# Patient Record
Sex: FEMALE | Race: WHITE | NOT HISPANIC OR LATINO | Employment: UNEMPLOYED | URBAN - METROPOLITAN AREA
[De-identification: names, ages, dates, MRNs, and addresses within clinical notes are randomized per-mention and may not be internally consistent; named-entity substitution may affect disease eponyms.]

---

## 2017-09-13 ENCOUNTER — OFFICE VISIT (OUTPATIENT)
Dept: CARDIOLOGY | Facility: CLINIC | Age: 52
End: 2017-09-13

## 2017-09-13 ENCOUNTER — LAB (OUTPATIENT)
Dept: LAB | Facility: HOSPITAL | Age: 52
End: 2017-09-13
Attending: INTERNAL MEDICINE

## 2017-09-13 ENCOUNTER — HOSPITAL ENCOUNTER (OUTPATIENT)
Dept: CARDIOLOGY | Facility: HOSPITAL | Age: 52
Discharge: HOME | End: 2017-09-13
Attending: INTERNAL MEDICINE | Admitting: INTERNAL MEDICINE

## 2017-09-13 VITALS
HEIGHT: 65 IN | RESPIRATION RATE: 20 BRPM | DIASTOLIC BLOOD PRESSURE: 90 MMHG | SYSTOLIC BLOOD PRESSURE: 132 MMHG | BODY MASS INDEX: 26.66 KG/M2 | HEART RATE: 86 BPM | WEIGHT: 160 LBS

## 2017-09-13 VITALS
WEIGHT: 160 LBS | HEART RATE: 84 BPM | HEIGHT: 65 IN | BODY MASS INDEX: 26.66 KG/M2 | OXYGEN SATURATION: 98 % | SYSTOLIC BLOOD PRESSURE: 132 MMHG | DIASTOLIC BLOOD PRESSURE: 90 MMHG

## 2017-09-13 DIAGNOSIS — E78.5 HYPERLIPIDEMIA, UNSPECIFIED HYPERLIPIDEMIA TYPE: ICD-10-CM

## 2017-09-13 DIAGNOSIS — I10 ESSENTIAL HYPERTENSION: ICD-10-CM

## 2017-09-13 DIAGNOSIS — R07.2 PRECORDIAL PAIN: Primary | ICD-10-CM

## 2017-09-13 DIAGNOSIS — Z72.0 TOBACCO ABUSE: ICD-10-CM

## 2017-09-13 DIAGNOSIS — R07.2 PRECORDIAL PAIN: ICD-10-CM

## 2017-09-13 DIAGNOSIS — R55 NEAR SYNCOPE: ICD-10-CM

## 2017-09-13 DIAGNOSIS — R00.2 PALPITATIONS: ICD-10-CM

## 2017-09-13 DIAGNOSIS — R06.09 DYSPNEA ON EXERTION: ICD-10-CM

## 2017-09-13 LAB
ARTICHOKE IGE QN: 169 MG/DL (ref 0–100)
BH CV ECHO MEAS - ACS: 2.1 CM
BH CV ECHO MEAS - AO MAX PG: 5.2 MMHG
BH CV ECHO MEAS - AO ROOT AREA (BSA CORRECTED): 1.7
BH CV ECHO MEAS - AO ROOT AREA: 7.3 CM^2
BH CV ECHO MEAS - AO ROOT DIAM: 3 CM
BH CV ECHO MEAS - AO V2 MAX: 114 CM/SEC
BH CV ECHO MEAS - BSA(HAYCOCK): 1.8 M^2
BH CV ECHO MEAS - BSA: 1.8 M^2
BH CV ECHO MEAS - BZI_BMI: 26.6 KILOGRAMS/M^2
BH CV ECHO MEAS - BZI_METRIC_HEIGHT: 165.1 CM
BH CV ECHO MEAS - BZI_METRIC_WEIGHT: 72.6 KG
BH CV ECHO MEAS - CONTRAST EF 4CH: 80.6 ML/M^2
BH CV ECHO MEAS - EDV(MOD-SP4): 62 ML
BH CV ECHO MEAS - EDV(TEICH): 140.5 ML
BH CV ECHO MEAS - EF(CUBED): 76.1 %
BH CV ECHO MEAS - EF(MOD-SP4): 80.6 %
BH CV ECHO MEAS - EF(TEICH): 67.5 %
BH CV ECHO MEAS - ESV(MOD-SP4): 12 ML
BH CV ECHO MEAS - ESV(TEICH): 45.6 ML
BH CV ECHO MEAS - FS: 37.9 %
BH CV ECHO MEAS - IVS/LVPW: 1.1
BH CV ECHO MEAS - IVSD: 1 CM
BH CV ECHO MEAS - LAT PEAK E' VEL: 7 CM/SEC
BH CV ECHO MEAS - LV DIASTOLIC VOL/BSA (35-75): 34.5 ML/M^2
BH CV ECHO MEAS - LV MASS(C)D: 193.9 GRAMS
BH CV ECHO MEAS - LV MASS(C)DI: 107.8 GRAMS/M^2
BH CV ECHO MEAS - LV SYSTOLIC VOL/BSA (12-30): 6.7 ML/M^2
BH CV ECHO MEAS - LVIDD: 5.4 CM
BH CV ECHO MEAS - LVIDS: 3.3 CM
BH CV ECHO MEAS - LVLD AP4: 7.3 CM
BH CV ECHO MEAS - LVLS AP4: 5.7 CM
BH CV ECHO MEAS - LVPWD: 0.91 CM
BH CV ECHO MEAS - MED PEAK E' VEL: 9 CM/SEC
BH CV ECHO MEAS - MV A DUR: 0.09 SEC
BH CV ECHO MEAS - MV A MAX VEL: 82 CM/SEC
BH CV ECHO MEAS - MV DEC SLOPE: 243.2 CM/SEC^2
BH CV ECHO MEAS - MV DEC TIME: 0.23 SEC
BH CV ECHO MEAS - MV E MAX VEL: 52.9 CM/SEC
BH CV ECHO MEAS - MV E/A: 0.64
BH CV ECHO MEAS - MV P1/2T MAX VEL: 57.7 CM/SEC
BH CV ECHO MEAS - MV P1/2T: 69.5 MSEC
BH CV ECHO MEAS - MVA P1/2T LCG: 3.8 CM^2
BH CV ECHO MEAS - MVA(P1/2T): 3.2 CM^2
BH CV ECHO MEAS - PULM A REVS DUR: 0.1 SEC
BH CV ECHO MEAS - PULM A REVS VEL: 28.6 CM/SEC
BH CV ECHO MEAS - PULM DIAS VEL: 40.7 CM/SEC
BH CV ECHO MEAS - PULM S/D: 1.2
BH CV ECHO MEAS - PULM SYS VEL: 50.4 CM/SEC
BH CV ECHO MEAS - SI(CUBED): 66 ML/M^2
BH CV ECHO MEAS - SI(MOD-SP4): 27.8 ML/M^2
BH CV ECHO MEAS - SI(TEICH): 52.7 ML/M^2
BH CV ECHO MEAS - SV(CUBED): 118.8 ML
BH CV ECHO MEAS - SV(MOD-SP4): 50 ML
BH CV ECHO MEAS - SV(TEICH): 94.9 ML
BH CV ECHO MEAS - TAPSE (>1.6): 2 CM2
BH CV STRESS BP STAGE 1: NORMAL
BH CV STRESS BP STAGE 2: NORMAL
BH CV STRESS DURATION MIN STAGE 1: 3
BH CV STRESS DURATION MIN STAGE 2: 3
BH CV STRESS DURATION SEC STAGE 1: 0
BH CV STRESS DURATION SEC STAGE 2: 0
BH CV STRESS ECHO POST STRESS EJECTION FRACTION EF: 81 %
BH CV STRESS GRADE STAGE 1: 10
BH CV STRESS GRADE STAGE 2: 12
BH CV STRESS HR STAGE 1: 118
BH CV STRESS HR STAGE 2: 150
BH CV STRESS METS STAGE 1: 5
BH CV STRESS METS STAGE 2: 7.5
BH CV STRESS PROTOCOL 1: NORMAL
BH CV STRESS SPEED STAGE 1: 1.7
BH CV STRESS SPEED STAGE 2: 2.5
BH CV STRESS STAGE 1: 1
BH CV STRESS STAGE 2: 2
BH CV XLRA - RV BASE: 2.7 CM
BH CV XLRA - TDI S': 10 CM/SEC
E/E' RATIO: 6.5
LEFT ATRIUM VOLUME INDEX: 13 ML/M2
LV EF 2D ECHO EST: 67 %
MAXIMAL PREDICTED HEART RATE: 166 BPM
PERCENT MAX PREDICTED HR: 90.36 %
STRESS BASELINE BP: NORMAL MMHG
STRESS BASELINE HR: 86 BPM
STRESS PERCENT HR: 106 %
STRESS POST ESTIMATED WORKLOAD: 7 METS
STRESS POST EXERCISE DUR MIN: 6 MIN
STRESS POST EXERCISE DUR SEC: 0 SEC
STRESS POST PEAK BP: NORMAL MMHG
STRESS POST PEAK HR: 150 BPM
STRESS TARGET HR: 141 BPM

## 2017-09-13 PROCEDURE — C8928 TTE W OR W/O FOL W/CON,STRES: HCPCS

## 2017-09-13 PROCEDURE — 93320 DOPPLER ECHO COMPLETE: CPT

## 2017-09-13 PROCEDURE — 93350 STRESS TTE ONLY: CPT | Performed by: INTERNAL MEDICINE

## 2017-09-13 PROCEDURE — 93016 CV STRESS TEST SUPVJ ONLY: CPT | Performed by: INTERNAL MEDICINE

## 2017-09-13 PROCEDURE — 99215 OFFICE O/P EST HI 40 MIN: CPT | Performed by: INTERNAL MEDICINE

## 2017-09-13 PROCEDURE — 93017 CV STRESS TEST TRACING ONLY: CPT

## 2017-09-13 PROCEDURE — 36415 COLL VENOUS BLD VENIPUNCTURE: CPT

## 2017-09-13 PROCEDURE — 25010000002 PERFLUTREN (DEFINITY) 8.476 MG IN SODIUM CHLORIDE 10 ML INJECTION: Performed by: INTERNAL MEDICINE

## 2017-09-13 PROCEDURE — 93000 ELECTROCARDIOGRAM COMPLETE: CPT | Performed by: INTERNAL MEDICINE

## 2017-09-13 PROCEDURE — 83721 ASSAY OF BLOOD LIPOPROTEIN: CPT

## 2017-09-13 PROCEDURE — 93325 DOPPLER ECHO COLOR FLOW MAPG: CPT

## 2017-09-13 PROCEDURE — 93018 CV STRESS TEST I&R ONLY: CPT | Performed by: INTERNAL MEDICINE

## 2017-09-13 PROCEDURE — 93320 DOPPLER ECHO COMPLETE: CPT | Performed by: INTERNAL MEDICINE

## 2017-09-13 PROCEDURE — 93325 DOPPLER ECHO COLOR FLOW MAPG: CPT | Performed by: INTERNAL MEDICINE

## 2017-09-13 RX ADMIN — PERFLUTREN 3 ML: 6.52 INJECTION, SUSPENSION INTRAVENOUS at 15:36
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
## 2017-12-01 ENCOUNTER — APPOINTMENT (OUTPATIENT)
Dept: CT IMAGING | Facility: HOSPITAL | Age: 52
End: 2017-12-01

## 2017-12-01 ENCOUNTER — HOSPITAL ENCOUNTER (INPATIENT)
Facility: HOSPITAL | Age: 52
LOS: 12 days | Discharge: HOME HEALTH | End: 2017-12-13
Attending: FAMILY MEDICINE | Admitting: INTERNAL MEDICINE

## 2017-12-01 ENCOUNTER — APPOINTMENT (OUTPATIENT)
Dept: GENERAL RADIOLOGY | Facility: HOSPITAL | Age: 52
End: 2017-12-01

## 2017-12-01 DIAGNOSIS — E87.6 HYPOKALEMIA: ICD-10-CM

## 2017-12-01 DIAGNOSIS — I63.9 CEREBROVASCULAR ACCIDENT (CVA), UNSPECIFIED MECHANISM (HCC): Primary | ICD-10-CM

## 2017-12-01 DIAGNOSIS — I69.354 HEMIPARESIS AFFECTING LEFT SIDE AS LATE EFFECT OF CEREBROVASCULAR ACCIDENT (HCC): ICD-10-CM

## 2017-12-01 LAB
ABO GROUP BLD: NORMAL
ALBUMIN SERPL-MCNC: 4 G/DL (ref 3.5–5.2)
ALBUMIN/GLOB SERPL: 1.3 G/DL
ALP SERPL-CCNC: 88 U/L (ref 39–117)
ALT SERPL W P-5'-P-CCNC: 24 U/L (ref 1–33)
ANION GAP SERPL CALCULATED.3IONS-SCNC: 14 MMOL/L
AST SERPL-CCNC: 26 U/L (ref 1–32)
BASOPHILS # BLD AUTO: 0.02 10*3/MM3 (ref 0–0.2)
BASOPHILS NFR BLD AUTO: 0.2 % (ref 0–1.5)
BILIRUB SERPL-MCNC: 0.8 MG/DL (ref 0.1–1.2)
BLD GP AB SCN SERPL QL: NEGATIVE
BUN BLD-MCNC: 5 MG/DL (ref 6–20)
BUN/CREAT SERPL: 6.7 (ref 7–25)
CALCIUM SPEC-SCNC: 8.9 MG/DL (ref 8.6–10.5)
CHLORIDE SERPL-SCNC: 98 MMOL/L (ref 98–107)
CO2 SERPL-SCNC: 30 MMOL/L (ref 22–29)
CREAT BLD-MCNC: 0.75 MG/DL (ref 0.57–1)
DEPRECATED RDW RBC AUTO: 62 FL (ref 37–54)
EOSINOPHIL # BLD AUTO: 0.03 10*3/MM3 (ref 0–0.7)
EOSINOPHIL NFR BLD AUTO: 0.3 % (ref 0.3–6.2)
ERYTHROCYTE [DISTWIDTH] IN BLOOD BY AUTOMATED COUNT: 15.6 % (ref 11.7–13)
GFR SERPL CREATININE-BSD FRML MDRD: 81 ML/MIN/1.73
GLOBULIN UR ELPH-MCNC: 3 GM/DL
GLUCOSE BLD-MCNC: 106 MG/DL (ref 65–99)
GLUCOSE BLDC GLUCOMTR-MCNC: 111 MG/DL (ref 70–130)
GLUCOSE BLDC GLUCOMTR-MCNC: 113 MG/DL (ref 70–130)
HCT VFR BLD AUTO: 47.7 % (ref 35.6–45.5)
HGB BLD-MCNC: 16.4 G/DL (ref 11.9–15.5)
HOLD SPECIMEN: NORMAL
HOLD SPECIMEN: NORMAL
IMM GRANULOCYTES # BLD: 0.02 10*3/MM3 (ref 0–0.03)
IMM GRANULOCYTES NFR BLD: 0.2 % (ref 0–0.5)
INR PPP: 0.96 (ref 0.9–1.1)
LYMPHOCYTES # BLD AUTO: 2.27 10*3/MM3 (ref 0.9–4.8)
LYMPHOCYTES NFR BLD AUTO: 25.2 % (ref 19.6–45.3)
MCH RBC QN AUTO: 37.1 PG (ref 26.9–32)
MCHC RBC AUTO-ENTMCNC: 34.4 G/DL (ref 32.4–36.3)
MCV RBC AUTO: 107.9 FL (ref 80.5–98.2)
MONOCYTES # BLD AUTO: 0.59 10*3/MM3 (ref 0.2–1.2)
MONOCYTES NFR BLD AUTO: 6.5 % (ref 5–12)
NEUTROPHILS # BLD AUTO: 6.09 10*3/MM3 (ref 1.9–8.1)
NEUTROPHILS NFR BLD AUTO: 67.6 % (ref 42.7–76)
NRBC BLD MANUAL-RTO: 0.2 /100 WBC (ref 0–0)
PLAT MORPH BLD: NORMAL
PLATELET # BLD AUTO: 300 10*3/MM3 (ref 140–500)
PMV BLD AUTO: 10 FL (ref 6–12)
POTASSIUM BLD-SCNC: 2.9 MMOL/L (ref 3.5–5.2)
PROT SERPL-MCNC: 7 G/DL (ref 6–8.5)
PROTHROMBIN TIME: 12.4 SECONDS (ref 11.7–14.2)
RBC # BLD AUTO: 4.42 10*6/MM3 (ref 3.9–5.2)
RBC MORPH BLD: NORMAL
RH BLD: POSITIVE
SODIUM BLD-SCNC: 142 MMOL/L (ref 136–145)
WBC MORPH BLD: NORMAL
WBC NRBC COR # BLD: 9.02 10*3/MM3 (ref 4.5–10.7)
WHOLE BLOOD HOLD SPECIMEN: NORMAL
WHOLE BLOOD HOLD SPECIMEN: NORMAL

## 2017-12-01 PROCEDURE — 93005 ELECTROCARDIOGRAM TRACING: CPT | Performed by: FAMILY MEDICINE

## 2017-12-01 PROCEDURE — 0 IOPAMIDOL PER 1 ML: Performed by: FAMILY MEDICINE

## 2017-12-01 PROCEDURE — 82962 GLUCOSE BLOOD TEST: CPT

## 2017-12-01 PROCEDURE — 25010000003 POTASSIUM CHLORIDE 10 MEQ/100ML SOLUTION: Performed by: INTERNAL MEDICINE

## 2017-12-01 PROCEDURE — 85025 COMPLETE CBC W/AUTO DIFF WBC: CPT | Performed by: FAMILY MEDICINE

## 2017-12-01 PROCEDURE — 80053 COMPREHEN METABOLIC PANEL: CPT | Performed by: FAMILY MEDICINE

## 2017-12-01 PROCEDURE — 70496 CT ANGIOGRAPHY HEAD: CPT

## 2017-12-01 PROCEDURE — 86900 BLOOD TYPING SEROLOGIC ABO: CPT | Performed by: FAMILY MEDICINE

## 2017-12-01 PROCEDURE — 25010000003 POTASSIUM CHLORIDE 10 MEQ/100ML SOLUTION: Performed by: FAMILY MEDICINE

## 2017-12-01 PROCEDURE — 86850 RBC ANTIBODY SCREEN: CPT | Performed by: FAMILY MEDICINE

## 2017-12-01 PROCEDURE — 71010 HC CHEST PA OR AP: CPT

## 2017-12-01 PROCEDURE — 25010000002 ALTEPLASE PER 1 MG

## 2017-12-01 PROCEDURE — 70498 CT ANGIOGRAPHY NECK: CPT

## 2017-12-01 PROCEDURE — 86901 BLOOD TYPING SEROLOGIC RH(D): CPT | Performed by: FAMILY MEDICINE

## 2017-12-01 PROCEDURE — 3E03317 INTRODUCTION OF OTHER THROMBOLYTIC INTO PERIPHERAL VEIN, PERCUTANEOUS APPROACH: ICD-10-PCS | Performed by: PSYCHIATRY & NEUROLOGY

## 2017-12-01 PROCEDURE — 99291 CRITICAL CARE FIRST HOUR: CPT

## 2017-12-01 PROCEDURE — 99291 CRITICAL CARE FIRST HOUR: CPT | Performed by: PSYCHIATRY & NEUROLOGY

## 2017-12-01 PROCEDURE — 25010000002 ALTEPLASE PER 1 MG: Performed by: PSYCHIATRY & NEUROLOGY

## 2017-12-01 PROCEDURE — 85007 BL SMEAR W/DIFF WBC COUNT: CPT | Performed by: FAMILY MEDICINE

## 2017-12-01 PROCEDURE — 93010 ELECTROCARDIOGRAM REPORT: CPT | Performed by: INTERNAL MEDICINE

## 2017-12-01 PROCEDURE — 82565 ASSAY OF CREATININE: CPT

## 2017-12-01 PROCEDURE — 85610 PROTHROMBIN TIME: CPT | Performed by: FAMILY MEDICINE

## 2017-12-01 PROCEDURE — 0042T HC CT CEREBRAL PERFUSION W/WO CONTRAST: CPT

## 2017-12-01 RX ORDER — ASPIRIN 325 MG
325 TABLET ORAL DAILY
Status: DISCONTINUED | OUTPATIENT
Start: 2017-12-02 | End: 2017-12-02

## 2017-12-01 RX ORDER — POTASSIUM CHLORIDE 7.45 MG/ML
10 INJECTION INTRAVENOUS
Status: DISCONTINUED | OUTPATIENT
Start: 2017-12-01 | End: 2017-12-13 | Stop reason: HOSPADM

## 2017-12-01 RX ORDER — POTASSIUM CHLORIDE 750 MG/1
40 CAPSULE, EXTENDED RELEASE ORAL AS NEEDED
Status: DISCONTINUED | OUTPATIENT
Start: 2017-12-01 | End: 2017-12-13 | Stop reason: HOSPADM

## 2017-12-01 RX ORDER — ROPINIROLE 1 MG/1
1 TABLET, FILM COATED ORAL NIGHTLY
COMMUNITY

## 2017-12-01 RX ORDER — METOCLOPRAMIDE 10 MG/1
10 TABLET ORAL
COMMUNITY
End: 2017-12-13 | Stop reason: HOSPADM

## 2017-12-01 RX ORDER — POTASSIUM CHLORIDE 1.5 G/1.77G
40 POWDER, FOR SOLUTION ORAL AS NEEDED
Status: DISCONTINUED | OUTPATIENT
Start: 2017-12-01 | End: 2017-12-13 | Stop reason: HOSPADM

## 2017-12-01 RX ORDER — SODIUM CHLORIDE 0.9 % (FLUSH) 0.9 %
10 SYRINGE (ML) INJECTION AS NEEDED
Status: DISCONTINUED | OUTPATIENT
Start: 2017-12-01 | End: 2017-12-13 | Stop reason: HOSPADM

## 2017-12-01 RX ORDER — ASPIRIN 300 MG/1
300 SUPPOSITORY RECTAL DAILY
Status: CANCELLED | OUTPATIENT
Start: 2017-12-02

## 2017-12-01 RX ORDER — FAMOTIDINE 10 MG/ML
20 INJECTION, SOLUTION INTRAVENOUS EVERY 12 HOURS SCHEDULED
Status: DISCONTINUED | OUTPATIENT
Start: 2017-12-01 | End: 2017-12-04

## 2017-12-01 RX ORDER — ATORVASTATIN CALCIUM 80 MG/1
80 TABLET, FILM COATED ORAL NIGHTLY
Status: CANCELLED | OUTPATIENT
Start: 2017-12-01

## 2017-12-01 RX ORDER — SODIUM CHLORIDE 9 MG/ML
100 INJECTION, SOLUTION INTRAVENOUS ONCE
Status: COMPLETED | OUTPATIENT
Start: 2017-12-01 | End: 2017-12-01

## 2017-12-01 RX ORDER — SODIUM CHLORIDE 0.9 % (FLUSH) 0.9 %
1-10 SYRINGE (ML) INJECTION AS NEEDED
Status: CANCELLED | OUTPATIENT
Start: 2017-12-01

## 2017-12-01 RX ORDER — SODIUM CHLORIDE 0.9 % (FLUSH) 0.9 %
1-10 SYRINGE (ML) INJECTION AS NEEDED
Status: DISCONTINUED | OUTPATIENT
Start: 2017-12-01 | End: 2017-12-13 | Stop reason: HOSPADM

## 2017-12-01 RX ORDER — ASPIRIN 325 MG
325 TABLET ORAL DAILY
Status: CANCELLED | OUTPATIENT
Start: 2017-12-02

## 2017-12-01 RX ORDER — ASPIRIN 300 MG/1
300 SUPPOSITORY RECTAL DAILY
Status: DISCONTINUED | OUTPATIENT
Start: 2017-12-02 | End: 2017-12-02

## 2017-12-01 RX ORDER — ATORVASTATIN CALCIUM 80 MG/1
80 TABLET, FILM COATED ORAL NIGHTLY
Status: DISCONTINUED | OUTPATIENT
Start: 2017-12-01 | End: 2017-12-13 | Stop reason: HOSPADM

## 2017-12-01 RX ORDER — SODIUM CHLORIDE 9 MG/ML
75 INJECTION, SOLUTION INTRAVENOUS CONTINUOUS
Status: DISCONTINUED | OUTPATIENT
Start: 2017-12-01 | End: 2017-12-13 | Stop reason: HOSPADM

## 2017-12-01 RX ORDER — CYCLOBENZAPRINE HCL 10 MG
10 TABLET ORAL 2 TIMES DAILY PRN
COMMUNITY
End: 2017-12-13 | Stop reason: HOSPADM

## 2017-12-01 RX ORDER — POTASSIUM CHLORIDE 7.45 MG/ML
10 INJECTION INTRAVENOUS ONCE
Status: COMPLETED | OUTPATIENT
Start: 2017-12-01 | End: 2017-12-01

## 2017-12-01 RX ADMIN — SODIUM CHLORIDE 100 ML/HR: 9 INJECTION, SOLUTION INTRAVENOUS at 16:48

## 2017-12-01 RX ADMIN — FAMOTIDINE 20 MG: 10 INJECTION INTRAVENOUS at 21:00

## 2017-12-01 RX ADMIN — SODIUM CHLORIDE 75 ML/HR: 9 INJECTION, SOLUTION INTRAVENOUS at 17:14

## 2017-12-01 RX ADMIN — ALTEPLASE 54.7 MG: KIT at 16:03

## 2017-12-01 RX ADMIN — POTASSIUM CHLORIDE 10 MEQ: 7.46 INJECTION, SOLUTION INTRAVENOUS at 17:22

## 2017-12-01 RX ADMIN — ALTEPLASE 6.1 MG: KIT at 16:00

## 2017-12-01 RX ADMIN — POTASSIUM CHLORIDE 10 MEQ: 7.46 INJECTION, SOLUTION INTRAVENOUS at 22:18

## 2017-12-01 RX ADMIN — FAMOTIDINE 20 MG: 10 INJECTION INTRAVENOUS at 17:15

## 2017-12-01 RX ADMIN — IOPAMIDOL 150 ML: 755 INJECTION, SOLUTION INTRAVENOUS at 15:50

## 2017-12-01 NOTE — ED NOTES
Patient stated she was not feeling well all day, weak, could barely walk. Stated her right side gave out on her and she fell and hit her head on the kitchen table. Complains of a headache due to fall on the right back side of head. Also states she has not been feeling well for 4 weeks and had an US for gallbladder and has not received the results yet.      Shira Lawrence RN  12/01/17 4994       Shira Lawrence RN  12/01/17 6855

## 2017-12-01 NOTE — ED PROVIDER NOTES
EMERGENCY DEPARTMENT ENCOUNTER    CHIEF COMPLAINT  Chief Complaint: Neuro deficits  History given by: Patient and EMS  History limited by: Nothing  Room Number: 387/1  PMD: Ivonne Bradley MD      HPI:  Pt is a 52 y.o. female who presents complaining of neuro deficits onset 1300 today. The pt was talking to family on the phone when the pt states she felt weak and fell to the right. The pt was last known normal at 1300 when she was talking on the phone with family. The pt's family was unable to get in contact of the pt, so EMS was called. The pt states she hit her head during the fall. Pt reports HA, left facial paralysis, and left sided weakness. Pt denies CP.    When EMS arrived, they state the pt had left facial droop and left sided weakness. EMS states the pt was unable to squeeze on the left upon initial encounter, but they state the pt has improved since their first exam. The pt had a BS of 99 per EMS.      Duration: Since 1300 today  Onset: Sudden  Timing: Constant  Location: Left sided  Radiation: None  Quality: Weakness  Intensity/Severity: Moderate  Progression: Improving per EMS  Associated Symptoms: Pt reports HA, left facial paralysis, and left sided weakness.  Aggravating Factors: None stated  Alleviating Factors: None stated  Previous Episodes: None  Treatment before arrival: Nothing    PAST MEDICAL HISTORY  Active Ambulatory Problems     Diagnosis Date Noted   • Complete tear of right rotator cuff 03/22/2016   • Precordial pain 09/13/2017   • Dyspnea on exertion 09/13/2017   • Palpitations 09/13/2017   • Essential hypertension 09/13/2017   • Hyperlipidemia 09/13/2017   • Tobacco abuse 09/13/2017     Resolved Ambulatory Problems     Diagnosis Date Noted   • No Resolved Ambulatory Problems     Past Medical History:   Diagnosis Date   • Allergic rhinitis    • Ankle swelling    • Anxiety    • Arthritis    • Asthma    • Attention deficit hyperactivity disorder    • Back pain    • Chest pain    • CHF  (congestive heart failure)    • COPD (chronic obstructive pulmonary disease)    • Coronary artery disease    • Depression    • Esophageal reflux    • High cholesterol    • History of colonoscopy    • Hypercholesterolemia    • Hypertension    • IBS (irritable bowel syndrome)    • Insomnia    • Osteopenia    • Palpitations    • Rotator cuff tendonitis    • Seborrheic dermatitis    • Stroke    • Tendinitis, de Quervain's    • Tobacco use    • Vitamin B12 deficiency    • Vitamin D deficiency        PAST SURGICAL HISTORY  Past Surgical History:   Procedure Laterality Date   • COLONOSCOPY     • DIAGNOSTIC LAPAROSCOPY EXPLORATORY LAPAROTOMY     • ELBOW ARTHROPLASTY Right     Elbow surgery   • FRACTURE SURGERY     • HYSTERECTOMY  1999    Including both ovaries   • TONSILLECTOMY     • WRIST SURGERY Left        FAMILY HISTORY  Family History   Problem Relation Age of Onset   • Heart attack Mother    • Heart disease Mother    • Skin cancer Mother    • Lung cancer Father    • Heart attack Brother    • Heart disease Brother      CABG; Pacemaker Placement   • Colon cancer Brother 61   • Heart disease Maternal Grandmother    • Anxiety disorder Other      (Symptom)   • Colon cancer Other    • Depression Other        SOCIAL HISTORY  Social History     Social History   • Marital status:      Spouse name: N/A   • Number of children: N/A   • Years of education: N/A     Occupational History   • Not on file.     Social History Main Topics   • Smoking status: Current Every Day Smoker     Packs/day: 1.50   • Smokeless tobacco: Never Used      Comment: caffeine use   • Alcohol use Yes      Comment: twice monthly-socially   • Drug use: No   • Sexual activity: Not Currently     Other Topics Concern   • Not on file     Social History Narrative       ALLERGIES  Sulfa antibiotics; Beta adrenergic blockers; Codeine; Erythromycin; Penicillins; Tetracyclines & related; and Varenicline    REVIEW OF SYSTEMS  Review of Systems   Constitutional:  Negative for chills and fever.   HENT: Negative for sore throat and trouble swallowing.    Eyes: Negative for visual disturbance.   Respiratory: Negative for cough and shortness of breath.    Cardiovascular: Negative for chest pain, palpitations and leg swelling.   Gastrointestinal: Negative for abdominal pain, diarrhea and vomiting.   Endocrine: Negative.    Genitourinary: Negative for decreased urine volume, dysuria and frequency.   Musculoskeletal: Negative for neck pain.   Skin: Negative for rash.   Allergic/Immunologic: Negative.    Neurological: Positive for facial asymmetry (Left), weakness (Left -sided) and headaches. Negative for syncope and numbness.   Hematological: Negative.    Psychiatric/Behavioral: Negative.    All other systems reviewed and are negative.      PHYSICAL EXAM  ED Triage Vitals   Temp Heart Rate Resp BP SpO2   -- 12/01/17 1529 12/01/17 1529 12/01/17 1529 12/01/17 1529    88 16 95/63 100 %      Temp src Heart Rate Source Patient Position BP Location FiO2 (%)   -- -- -- -- --              Physical Exam   Constitutional: She is oriented to person, place, and time.   HENT:   Head: Normocephalic and atraumatic.   Eyes: EOM are normal. Pupils are equal, round, and reactive to light.   Neck: Normal range of motion. Neck supple.   Cardiovascular: Normal rate, regular rhythm and normal heart sounds.    Pulmonary/Chest: Effort normal and breath sounds normal. No respiratory distress.   Abdominal: Soft. There is no tenderness. There is no rebound and no guarding.   Musculoskeletal: She exhibits no edema.   Neurological: She is alert and oriented to person, place, and time. She has normal sensation and normal strength. She displays facial asymmetry (Mild left).   The pt has weakness to the left arm. The pt has left-sided sensory deficit.   Skin: Skin is warm and dry. No rash noted.   Nursing note and vitals reviewed.      LAB RESULTS  Lab Results (last 24 hours)     Procedure Component Value Units  Date/Time    CBC & Differential [[id number]] Collected:  12/01/17 1537    Specimen:  Blood Updated:  12/01/17 1628    Narrative:       The following orders were created for panel order CBC & Differential.  Procedure                               Abnormality         Status                     ---------                               -----------         ------                     Scan Slide[[id number]]                   Normal              Final result               CBC Auto Differential[[id number]]        Abnormal            Final result                 Please view results for these tests on the individual orders.    Comprehensive Metabolic Panel [[id number]]  (Abnormal) Collected:  12/01/17 1537    Specimen:  Blood Updated:  12/01/17 1608     Glucose 106 (H) mg/dL      BUN 5 (L) mg/dL      Creatinine 0.75 mg/dL      Sodium 142 mmol/L      Potassium 2.9 (L) mmol/L      Chloride 98 mmol/L      CO2 30.0 (H) mmol/L      Calcium 8.9 mg/dL      Total Protein 7.0 g/dL      Albumin 4.00 g/dL      ALT (SGPT) 24 U/L      AST (SGOT) 26 U/L      Alkaline Phosphatase 88 U/L      Total Bilirubin 0.8 mg/dL      eGFR Non African Amer 81 mL/min/1.73      Globulin 3.0 gm/dL      A/G Ratio 1.3 g/dL      BUN/Creatinine Ratio 6.7 (L)     Anion Gap 14.0 mmol/L     Protime-INR [[id number]]  (Normal) Collected:  12/01/17 1537    Specimen:  Blood Updated:  12/01/17 1601     Protime 12.4 Seconds      INR 0.96    CBC Auto Differential [[id number]]  (Abnormal) Collected:  12/01/17 1537    Specimen:  Blood Updated:  12/01/17 1552     WBC 9.02 10*3/mm3      RBC 4.42 10*6/mm3      Hemoglobin 16.4 (H) g/dL      Hematocrit 47.7 (H) %      .9 (H) fL      MCH 37.1 (H) pg      MCHC 34.4 g/dL      RDW 15.6 (H) %      RDW-SD 62.0 (H) fl      MPV 10.0 fL      Platelets 300 10*3/mm3      Neutrophil % 67.6 %      Lymphocyte % 25.2 %      Monocyte % 6.5 %      Eosinophil % 0.3 %      Basophil % 0.2 %      Immature Grans % 0.2 %      Neutrophils, Absolute  6.09 10*3/mm3      Lymphocytes, Absolute 2.27 10*3/mm3      Monocytes, Absolute 0.59 10*3/mm3      Eosinophils, Absolute 0.03 10*3/mm3      Basophils, Absolute 0.02 10*3/mm3      Immature Grans, Absolute 0.02 10*3/mm3      nRBC 0.2 (H) /100 WBC     Scan Slide [[id number]]  (Normal) Collected:  12/01/17 1537    Specimen:  Blood Updated:  12/01/17 1628     RBC Morphology Normal     WBC Morphology Normal     Platelet Morphology Normal    POC Glucose Fingerstick [[id number]]  (Normal) Collected:  12/01/17 1807    Specimen:  Blood Updated:  12/01/17 1827     Glucose 113 mg/dL     Narrative:       Meter: [id number] : [id number] Ezequiel Albrecht          I ordered the above labs and reviewed the results    RADIOLOGY  XR Chest 1 View    (Results Pending)   CT Cerebral Perfusion With & Without Contrast    (Results Pending)   CT Angiogram Neck With & Without Contrast    (Results Pending)   CT Angiogram Head With & Without Contrast    (Results Pending)      CT scans negative for hemorrhage  CXR - Negative acute    I ordered the above noted radiological studies. Interpreted by radiologist. Reviewed by me in PACS.       PROCEDURES  Critical Care  Performed by: ALEYDA SANTO  Authorized by: ALEYDA SANTO     Critical care provider statement:     Critical care time (minutes):  35    Critical care time was exclusive of:  Separately billable procedures and treating other patients    Critical care was necessary to treat or prevent imminent or life-threatening deterioration of the following conditions:  CNS failure or compromise    Critical care was time spent personally by me on the following activities:  Development of treatment plan with patient or surrogate, discussions with consultants, evaluation of patient's response to treatment, examination of patient, obtaining history from patient or surrogate, ordering and performing treatments and interventions, ordering and review of laboratory studies, pulse oximetry and  re-evaluation of patient's condition        Interval: baseline  1a. Level Of Consciousness: 0-->Alert: keenly responsive  1b. LOC Questions: 0-->Answers both questions correctly  1c. LOC Commands: 0-->Performs both tasks correctly  2. Best Gaze: 0-->Normal  3. Visual: 0-->No visual loss  4. Facial Palsy: 1-->Minor paralysis (flattened nasolabial fold, asymmetry on smiling)  5a. Motor Arm, Left: 1-->Drift: limb holds 90 (or 45) degrees, but drifts down before full 10 seconds: does not hit bed or other support  5b. Motor Arm, Right: 0-->No drift: limb holds 90 (or 45) degrees for full 10 secs  6a. Motor Leg, Left: 0-->No drift: leg holds 30 degree position for full 5 secs  6b. Motor Leg, Right: 0-->No drift: leg holds 30 degree position for full 5 secs  7. Limb Ataxia: 0-->Absent  8. Sensory: 1-->Mild-to-moderate sensory loss: patient feels pinprick is less sharp or is dull on the affected side: or there is a loss of superficial pain with pinprick, but patient is aware of being touched  9. Best Language: 0-->No aphasia: normal  10. Dysarthria: 0-->Normal  11. Extinction and Inattention (formerly Neglect): 0-->No abnormality    Total (NIH Stroke Scale): 3      EKG           EKG time: 1639  Rhythm/Rate: 95 Normal Sinus  P waves and CA: Normal  QRS, axis: Normal   ST and T waves: Nonspecific ST depression     Interpreted Contemporaneously by me, independently viewed  ST depression is changed compared to prior Feb, 2004      PROGRESS AND CONSULTS  ED Course     1531  Received a call from Dr. Bunch and discussed pt's case. Dr. Bunch agreed with plan to order the CT scan. He agrees with the plan to administer tPA if the CT shows no hemorrhage.    1536  CXR, EKG, CT Head, and labs ordered for further evaluation.    1550  CTA Head, CTA Neck, and CT Cerebral Perfusion ordered for further evaluation.    1621  Dr. Bunch is in the ED and is evaluating the pt after the pt has received tPA.    1633  Consult placed to  Pulmonology.    1642  Received a call from Dr. Drew and discussed pt's case. Dr. Drew agreed with plan to admit the pt.    1711  Potassium chloride ordered.      MEDICAL DECISION MAKING  Results were reviewed/discussed with the patient and they were also made aware of online access. Pt also made aware that some labs, such as cultures, will not be resulted during ER visit and follow up with PMD is necessary.     MDM  Number of Diagnoses or Management Options  Cerebrovascular accident (CVA), unspecified mechanism:      Amount and/or Complexity of Data Reviewed  Clinical lab tests: ordered and reviewed (Potassium - 2.9)  Tests in the radiology section of CPT®: reviewed and ordered (CT Scans showed no hemorrhage  CXR - Negative acute)  Tests in the medicine section of CPT®: reviewed and ordered (Refer to the procedure section of the note for EKG results  )  Discussion of test results with the performing providers: yes (Dr. Drew)  Discuss the patient with other providers: yes (Dr. Bunch)    Patient Progress  Patient progress: stable         DIAGNOSIS  Final diagnoses:   Cerebrovascular accident (CVA), unspecified mechanism   Hypokalemia       DISPOSITION  ADMISSION    Discussed treatment plan and reason for admission with pt/family and admitting physician.  Pt/family voiced understanding of the plan for admission for further testing/treatment as needed.         Latest Documented Vital Signs:  As of 6:40 PM  BP- 135/88 HR- 91 Temp- 97.8 °F (36.6 °C) (Oral) O2 sat- 95%    --  Documentation assistance provided by patti Salinas for Dr. Leavitt.  Information recorded by the scribe was done at my direction and has been verified and validated by me.       Spencer Salinas  12/01/17 1841       Colton Leavitt MD  12/01/17 0150

## 2017-12-01 NOTE — PROGRESS NOTES
Clinical Pharmacy Services: Medication History    Angelita Brambila is a 52 y.o. female presenting to [facility] for   Chief Complaint   Patient presents with   • Neuro Deficit(s)       She  has a past medical history of Allergic rhinitis; Ankle swelling; Anxiety; Arthritis; Asthma; Attention deficit hyperactivity disorder; Back pain; Chest pain; Depression; Esophageal reflux; High cholesterol; History of colonoscopy; Hypercholesterolemia; Hypertension; IBS (irritable bowel syndrome); Insomnia; Multiple benign polyps of large intestine; Osteopenia; Palpitations; Rotator cuff tendonitis; Seborrheic dermatitis; Tendinitis, de Quervain's; Tobacco use; Vitamin B12 deficiency; and Vitamin D deficiency.    Allergies as of 12/01/2017 - Juan as Reviewed 12/01/2017   Allergen Reaction Noted   • Sulfa antibiotics Shortness Of Breath 01/02/2016   • Beta adrenergic blockers  01/02/2016   • Codeine  01/02/2016   • Erythromycin  01/02/2016   • Penicillins Hives 01/02/2016   • Tetracyclines & related  01/02/2016   • Varenicline  01/02/2016       Medication information was obtained from: Patient, medication bottles  Pharmacy and Phone Number: [facility] [phone]    Prior to Admission Medications     Prescriptions Last Dose Informant Patient Reported? Taking?    amLODIPine (NORVASC) 5 MG tablet 11/30/2017 Medication Bottle Yes Yes    Take 5 mg by mouth Daily.    carvedilol (COREG) 3.125 MG tablet 11/30/2017 Medication Bottle Yes Yes    Take 3.125 mg by mouth 2 (Two) Times a Day.    cyclobenzaprine (FLEXERIL) 10 MG tablet 11/30/2017 Medication Bottle Yes Yes    Take 10 mg by mouth 2 (Two) Times a Day As Needed for Muscle Spasms.    metoclopramide (REGLAN) 10 MG tablet 11/30/2017 Medication Bottle Yes Yes    Take 10 mg by mouth 3 (Three) Times a Day Before Meals.    pantoprazole (PROTONIX) 40 MG EC tablet 11/30/2017 Medication Bottle Yes Yes    Take 1 tablet by mouth daily.    rOPINIRole (REQUIP) 1 MG tablet 11/30/2017  Medication Bottle Yes Yes    Take 1 mg by mouth 2 (Two) Times a Day As Needed. Take 1 hour before bedtime.    traZODone (DESYREL) 50 MG tablet 11/30/2017 Medication Bottle Yes Yes    Take 1 tablet by mouth every night at bedtime.    venlafaxine XR (EFFEXOR-XR) 75 MG 24 hr capsule 11/30/2017 Medication Bottle Yes Yes    Take 225 mg by mouth Daily.    fluticasone (FLONASE) 50 MCG/ACT nasal spray  Medication Bottle Yes No    2 sprays into each nostril once daily.            Medication notes: Removed: Atorvastatin and Zyban, patient says she no longer takes medication    This medication list is complete to the best of my knowledge as of 12/1/2017    Please call if questions.    Amanda Ellis, Medication History Technician  12/1/2017 5:43 PM

## 2017-12-01 NOTE — ED NOTES
States she continues to have a headache from the fall, has not change in intensity during TPA.      Shira Lawrence RN  12/01/17 7546

## 2017-12-01 NOTE — H&P
Group: [facility]         H/p  NOTE    Patient Identification:  Angelita Brambila  52 y.o.  female  1965  [id number]                 CC:     History of Present Illness:  Very pleasant 52-year-old female history of tobacco abuse presented to [facility] ERS team D.  She was noted to have left-sided hemiparesis and initial NIH of 3 up to 10.  She was given TPA per neurology.  We were asked to admit to the ICU.  Patient currently with some improvement neurologically.  She is awake alert and complains of some headache.  Apparently she did have a fall at home and hit her head on the table.  Denies any chest pain no shortness of breath at this time.  No previous history of CVA.  History of MI in the past.      Review of Systems  Constitutional: Negative for chills and fever.   HENT: Negative for sore throat and trouble swallowing.    Eyes: Negative for visual disturbance.   Respiratory: Negative for cough and shortness of breath.    Cardiovascular: Negative for chest pain, palpitations and leg swelling.   Gastrointestinal: Negative for abdominal pain, diarrhea and vomiting.   Endocrine: Negative.    Genitourinary: Negative for decreased urine volume, dysuria and frequency.   Musculoskeletal: Negative for neck pain.   Skin: Negative for rash.   Allergic/Immunologic: Negative.    Neurological: Positive for facial asymmetry (Left), weakness (Left -sided) and headaches. Negative for syncope and numbness.   Hematological: Negative.    Psychiatric/Behavioral: Negative.    All other systems reviewed and are negative.  Past Medical History:  Past Medical History:   Diagnosis Date   • Allergic rhinitis    • Ankle swelling    • Anxiety    • Arthritis    • Asthma    • Attention deficit hyperactivity disorder    • Back pain    • Chest pain    • Depression    • Esophageal reflux    • High cholesterol     Reported cholesterol level was high   • History of colonoscopy     Complete colonoscopy   • Hypercholesterolemia    •  Hypertension    • IBS (irritable bowel syndrome)    • Insomnia    • Multiple benign polyps of large intestine    • Osteopenia    • Palpitations    • Rotator cuff tendonitis    • Seborrheic dermatitis    • Tendinitis, de Quervain's    • Tobacco use    • Vitamin B12 deficiency    • Vitamin D deficiency        Past Surgical History:  Past Surgical History:   Procedure Laterality Date   • COLONOSCOPY     • DIAGNOSTIC LAPAROSCOPY EXPLORATORY LAPAROTOMY     • ELBOW ARTHROPLASTY Right     Elbow surgery   • HYSTERECTOMY  1999    Including both ovaries   • TONSILLECTOMY     • WRIST SURGERY Left         Home Meds:    (Not in a hospital admission)    Allergies:  Allergies   Allergen Reactions   • Sulfa Antibiotics Shortness Of Breath   • Beta Adrenergic Blockers      Other reaction(s): Syncope   • Codeine    • Erythromycin      Other reaction(s): Abdominal pain   • Penicillins Hives   • Tetracyclines & Related      Other reaction(s): Abdominal pain   • Varenicline      Other reaction(s): Angina       Social History:   Social History     Social History   • Marital status:      Spouse name: N/A   • Number of children: N/A   • Years of education: N/A     Occupational History   • Not on file.     Social History Main Topics   • Smoking status: Current Every Day Smoker     Packs/day: 1.50   • Smokeless tobacco: Never Used      Comment: caffeine use   • Alcohol use Yes      Comment: twice monthly   • Drug use: No   • Sexual activity: Not on file     Other Topics Concern   • Not on file     Social History Narrative       Family History:  Family History   Problem Relation Age of Onset   • Heart attack Mother    • Heart disease Mother    • Skin cancer Mother    • Lung cancer Father    • Heart attack Brother    • Heart disease Brother      CABG; Pacemaker Placement   • Colon cancer Brother 61   • Heart disease Maternal Grandmother    • Anxiety disorder Other      (Symptom)   • Colon cancer Other    • Depression Other   "      Physical Exam:  /89  Pulse 90  Temp 98.7 °F (37.1 °C) (Tympanic)   Resp 16  Ht 65\" (165.1 cm)  Wt 149 lb 0.5 oz (67.6 kg)  SpO2 97%  BMI 24.8 kg/m2 Body mass index is 24.8 kg/(m^2). 97% 149 lb 0.5 oz (67.6 kg)  Physical Exam  Awake no distress no labored breathing  No respiratory distress  Neck is supple no bruit no adenopathy  Chest is clear no wheezing or rales  CVS regular rate and rhythm no murmurs no gallop  Normal sinus rhythm on the monitor  Abdomen is soft no masses felt nontender  Extremities no edema the sinuses no clubbing  CNS with left hemiparesis and left facial droop noted  No hallucination or delusion or joint deformities or skin rashes    LABS:  Lab Results   Component Value Date    CALCIUM 8.9 12/01/2017     Results from last 7 days  Lab Units 12/01/17  1537   SODIUM mmol/L 142   POTASSIUM mmol/L 2.9*   CHLORIDE mmol/L 98   CO2 mmol/L 30.0*   BUN mg/dL 5*   CREATININE mg/dL 0.75   GLUCOSE mg/dL 106*   CALCIUM mg/dL 8.9   WBC 10*3/mm3 9.02   HEMOGLOBIN g/dL 16.4*   PLATELETS 10*3/mm3 300   ALT (SGPT) U/L 24   AST (SGOT) U/L 26     No results found for: CKTOTAL, CKMB, CKMBINDEX, TROPONINI, TROPONINT                        Results from last 7 days  Lab Units 12/01/17  1537   INR  0.96         No results found for: TSH  Estimated Creatinine Clearance: 91.5 mL/min (by C-G formula based on Cr of 0.75).         Imaging: I personally visualized the images of scans/x-rays performed within last 3 days.      Assessment:  Acute CVA status post TPA  Elevated blood pressure  History of tobacco abuse  Hypokalemia    Recommendations:  Post TPA stroke protocol  Neuro checks per protocol  Blood pressure management as per stroke protocol  Replace electrolytes  ICU core measures  Post TPA swallow evaluation  IV fluids normal saline  Discussed with family  Admit to ICU            Aly Drew MD  12/1/2017  5:02 PM      Much of this encounter note is an electronic transcription/translation of " spoken language to printed text using Dragon Software.

## 2017-12-01 NOTE — PLAN OF CARE
Problem: Fall Risk (Adult)  Goal: Identify Related Risk Factors and Signs and Symptoms  Outcome: Ongoing (interventions implemented as appropriate)    12/01/17 1820   Fall Risk   Fall Risk: Related Risk Factors impaired vision;neuro disease/injury;sensory deficits   Fall Risk: Signs and Symptoms presence of risk factors       Goal: Absence of Falls  Outcome: Ongoing (interventions implemented as appropriate)    Problem: Stroke (Ischemic) (Adult)  Goal: Signs and Symptoms of Listed Potential Problems Will be Absent or Manageable (Stroke)  Outcome: Ongoing (interventions implemented as appropriate)    12/01/17 1820   Stroke (Ischemic)   Problems Assessed (Stroke (Ischemic)/TIA) all   Problems Present (Stroke (Ischemic)/TIA) acute neurologic deterioration;communication impairment;eating/swallowing impairment

## 2017-12-01 NOTE — CONSULTS
NEUROLOGY CONSULTATION        PATIENT Angelita Brambila    1965   MRN [id number]   ADMIT DATE 2017   LENGTH OF STAY 0 days   ATTENDING Colton Leavitt MD       HISTORY OF PRESENT ILLNESS:  This is a 52-year-old woman about 2 and half hours prior to arrival (1 PM) down.  No serious injury noted the patient doesn't know why she fell.  She was to her local hospital and [facility].  She arrived here 2-1/2 hours after the onset of the event.  The ER noted an NIH of 3 because of mild left arm leg and face weakness.  Team D was called and I met her in the CT scan.  The patient agreed with the above history.  He was anxious but had no specific complaints.  She agreed to is some weakness of the left side of her body she wasn't aware of any other deficit.  She had no head or neck pain.  She denied any falls other than no one at presentation.  Not in history of TIA or stroke.    She does have a history of an MI in  with negative coronaries.  I wrote her cardiology note from a few months ago and long family history of a mother who  of an MI, brother  of an MI and a niece who has coronary disease and is already been stented.    CHIEF COMPLAINT: Neuro Deficit(s)      DIAGNOSIS: There are no admission diagnoses documented for this encounter.      PAST MEDICAL HISTORY:   Past Medical History:   Diagnosis Date   • Allergic rhinitis    • Ankle swelling    • Anxiety    • Arthritis    • Asthma    • Attention deficit hyperactivity disorder    • Back pain    • Chest pain    • Depression    • Esophageal reflux    • High cholesterol     Reported cholesterol level was high   • History of colonoscopy     Complete colonoscopy   • Hypercholesterolemia    • Hypertension    • IBS (irritable bowel syndrome)    • Insomnia    • Multiple benign polyps of large intestine    • Osteopenia    • Palpitations    • Rotator cuff tendonitis    • Seborrheic dermatitis    • Tendinitis, de Quervain's    • Tobacco use    •  "Vitamin B12 deficiency    • Vitamin D deficiency        PAST SURGICAL HISTORY:  Past Surgical History:   Procedure Laterality Date   • COLONOSCOPY     • DIAGNOSTIC LAPAROSCOPY EXPLORATORY LAPAROTOMY     • ELBOW ARTHROPLASTY Right     Elbow surgery   • HYSTERECTOMY  1999    Including both ovaries   • TONSILLECTOMY     • WRIST SURGERY Left        CURRENT MEDICATIONS:  Scheduled Medications:  alteplase 54.7 mg Intravenous Once   sodium chloride 100 mL/hr Intravenous Once     Infusions:    PRN Medications:  sodium chloride    SOCIAL HISTORY:  Social History     Social History   • Marital status:      Spouse name: N/A   • Number of children: N/A   • Years of education: N/A     Social History Main Topics   • Smoking status: Current Every Day Smoker     Packs/day: 1.50   • Smokeless tobacco: Never Used      Comment: caffeine use   • Alcohol use Yes      Comment: twice monthly   • Drug use: No   • Sexual activity: Not Asked     Other Topics Concern   • None     Social History Narrative       FAMILY HISTORY:   I have reviewed this patient's family history and it is not significant to the present illness.      REVIEW OF SYSTEMS: 14 system review performed and all other systems are negative except for Neuro Deficit(s)         PHYSICAL EXAM:  GENERAL: Reveals a man/woman who appears his/her stated age, in no acute distress.  VITAL SIGNS: BP (!) 146/7  Pulse 92  Temp 98.7 °F (37.1 °C) (Tympanic)   Resp 16  Ht 65\" (165.1 cm)  Wt 149 lb 0.5 oz (67.6 kg)  SpO2 98%  BMI 24.8 kg/m2  NECK: Carotids are equal without bruits.   HEART: Regular rate and rhythm with no significant murmur or gallop.   EXTREMITIES: Nonedematous. Pulses are intact. There is no rash. There is no hepatosplenomegaly. No respiratory distress. There is no lymphadenopathy. There are no signs of cutaneous embolization.  NEUROLOGIC: Awake, alert and oriented x3. There is no aphasia.  There is a mild dysarthria  Patient can do simple calculations and " remembers two out of three objects in five minutes. Visual field exam reveals a left homonymous hemianopsia.. Disks are flat. Cranial nerves II through XII are intact except for a moderate left upper motor neuron facial weakness.  Power is normal in the right arm and right leg.  Slight weakness of the left psoas but all other muscles in the left leg are normal.  There is a slight drift of the left leg but it does not go down to the bed.  There is a drift of the left arm but she keeps it off the bed.. Tone is normal. Reflexes are 2 and symmetric in the upper and lower extremities. Toes are downgoing. Sensations is severely impaired in the arm and left leg in that she cannot even feel firm pressure or movement of those limbs.  Sensations normal in the right arm or right leg.. Cerebellar function reveals no ataxia on finger to nose or heel to shin but the left arm is slightly clumsy compared to the right.  Gait was not tested.    NIH 10      DIAGNOSTIC DATA:   Labs reviewed and revealed no significant abnormalities.     Radiology images personally reviewed and and reviewed with Dr. Johnson.  There is a moderate sized acute-appearing infarct in the right parietal and temporal region.  There is a moderate right internal carotid artery stenosis possible component of thrombus though it could be atherosclerotic.  There is no occlusion/thrombus in the lumbar M2 segments of the middle cerebral artery on the right.  I cannot detect a more distal occlusion.      IMPRESSION: It's pretty clear that the patient has suffered a right MCA distribution infarct.  As a moderate deficit as described above.  I Feel she meets criteria for IV TPA and after obtaining informed consent this was administered in the CT suite.  She will be admitted to the ICU and on the TPA protocol.  I will asked Dr. Monson to evaluate the right carotid stenosis but surely no acute intervention is required in this regard.  Will get an echo and MRI after the TPA  protocol is finished at 24 hours.  Also start ASA and statin then.  Obviously should stop smoking.      Thank you for allowing me to see this patient.    Timothy Bunch MD  12/1/2017     (called stat to ER.   Delivered intensive care for 45 minutes)  4:33 PM

## 2017-12-02 ENCOUNTER — APPOINTMENT (OUTPATIENT)
Dept: GENERAL RADIOLOGY | Facility: HOSPITAL | Age: 52
End: 2017-12-02

## 2017-12-02 ENCOUNTER — APPOINTMENT (OUTPATIENT)
Dept: CT IMAGING | Facility: HOSPITAL | Age: 52
End: 2017-12-02

## 2017-12-02 LAB
CHOLEST SERPL-MCNC: 184 MG/DL (ref 0–200)
GLUCOSE BLDC GLUCOMTR-MCNC: 108 MG/DL (ref 70–130)
GLUCOSE BLDC GLUCOMTR-MCNC: 112 MG/DL (ref 70–130)
GLUCOSE BLDC GLUCOMTR-MCNC: 147 MG/DL (ref 70–130)
HBA1C MFR BLD: 4.81 % (ref 4.8–5.6)
HDLC SERPL-MCNC: 40 MG/DL (ref 40–60)
LDLC SERPL CALC-MCNC: 116 MG/DL (ref 0–100)
LDLC/HDLC SERPL: 2.9 {RATIO}
MAGNESIUM SERPL-MCNC: 1.6 MG/DL (ref 1.6–2.6)
POTASSIUM BLD-SCNC: 3.2 MMOL/L (ref 3.5–5.2)
TRIGL SERPL-MCNC: 140 MG/DL (ref 0–150)
VLDLC SERPL-MCNC: 28 MG/DL (ref 5–40)

## 2017-12-02 PROCEDURE — 92610 EVALUATE SWALLOWING FUNCTION: CPT

## 2017-12-02 PROCEDURE — 73110 X-RAY EXAM OF WRIST: CPT

## 2017-12-02 PROCEDURE — 82962 GLUCOSE BLOOD TEST: CPT

## 2017-12-02 PROCEDURE — 83036 HEMOGLOBIN GLYCOSYLATED A1C: CPT | Performed by: INTERNAL MEDICINE

## 2017-12-02 PROCEDURE — 25010000002 ONDANSETRON PER 1 MG: Performed by: INTERNAL MEDICINE

## 2017-12-02 PROCEDURE — 84132 ASSAY OF SERUM POTASSIUM: CPT | Performed by: INTERNAL MEDICINE

## 2017-12-02 PROCEDURE — 83735 ASSAY OF MAGNESIUM: CPT | Performed by: INTERNAL MEDICINE

## 2017-12-02 PROCEDURE — 25010000003 POTASSIUM CHLORIDE 10 MEQ/100ML SOLUTION: Performed by: INTERNAL MEDICINE

## 2017-12-02 PROCEDURE — 99233 SBSQ HOSP IP/OBS HIGH 50: CPT | Performed by: PSYCHIATRY & NEUROLOGY

## 2017-12-02 PROCEDURE — 80061 LIPID PANEL: CPT | Performed by: INTERNAL MEDICINE

## 2017-12-02 PROCEDURE — 70450 CT HEAD/BRAIN W/O DYE: CPT

## 2017-12-02 RX ORDER — HYDROCODONE BITARTRATE AND ACETAMINOPHEN 5; 325 MG/1; MG/1
1 TABLET ORAL EVERY 6 HOURS PRN
Status: DISPENSED | OUTPATIENT
Start: 2017-12-02 | End: 2017-12-12

## 2017-12-02 RX ORDER — MAGNESIUM SULFATE HEPTAHYDRATE 40 MG/ML
4 INJECTION, SOLUTION INTRAVENOUS AS NEEDED
Status: DISCONTINUED | OUTPATIENT
Start: 2017-12-02 | End: 2017-12-13 | Stop reason: HOSPADM

## 2017-12-02 RX ORDER — HYDROMORPHONE HYDROCHLORIDE 1 MG/ML
0.5 INJECTION, SOLUTION INTRAMUSCULAR; INTRAVENOUS; SUBCUTANEOUS
Status: DISPENSED | OUTPATIENT
Start: 2017-12-02 | End: 2017-12-03

## 2017-12-02 RX ORDER — ROPINIROLE 1 MG/1
1 TABLET, FILM COATED ORAL 2 TIMES DAILY
Status: DISCONTINUED | OUTPATIENT
Start: 2017-12-02 | End: 2017-12-13 | Stop reason: HOSPADM

## 2017-12-02 RX ORDER — MAGNESIUM SULFATE HEPTAHYDRATE 40 MG/ML
2 INJECTION, SOLUTION INTRAVENOUS AS NEEDED
Status: DISCONTINUED | OUTPATIENT
Start: 2017-12-02 | End: 2017-12-13 | Stop reason: HOSPADM

## 2017-12-02 RX ORDER — ONDANSETRON 2 MG/ML
4 INJECTION INTRAMUSCULAR; INTRAVENOUS EVERY 6 HOURS PRN
Status: DISCONTINUED | OUTPATIENT
Start: 2017-12-02 | End: 2017-12-13 | Stop reason: HOSPADM

## 2017-12-02 RX ORDER — ACETAMINOPHEN 325 MG/1
650 TABLET ORAL EVERY 6 HOURS PRN
Status: DISCONTINUED | OUTPATIENT
Start: 2017-12-02 | End: 2017-12-13 | Stop reason: HOSPADM

## 2017-12-02 RX ADMIN — POTASSIUM CHLORIDE 40 MEQ: 1.5 POWDER, FOR SOLUTION ORAL at 23:07

## 2017-12-02 RX ADMIN — MAGNESIUM SULFATE HEPTAHYDRATE 4 G: 40 INJECTION, SOLUTION INTRAVENOUS at 23:03

## 2017-12-02 RX ADMIN — ROPINIROLE 1 MG: 1 TABLET, FILM COATED ORAL at 23:45

## 2017-12-02 RX ADMIN — ATORVASTATIN CALCIUM 80 MG: 80 TABLET, FILM COATED ORAL at 21:00

## 2017-12-02 RX ADMIN — POTASSIUM CHLORIDE 10 MEQ: 7.46 INJECTION, SOLUTION INTRAVENOUS at 03:30

## 2017-12-02 RX ADMIN — POTASSIUM CHLORIDE 10 MEQ: 7.46 INJECTION, SOLUTION INTRAVENOUS at 08:14

## 2017-12-02 RX ADMIN — ONDANSETRON 4 MG: 2 INJECTION INTRAMUSCULAR; INTRAVENOUS at 16:52

## 2017-12-02 RX ADMIN — POTASSIUM CHLORIDE 10 MEQ: 7.46 INJECTION, SOLUTION INTRAVENOUS at 02:25

## 2017-12-02 RX ADMIN — ACETAMINOPHEN 650 MG: 325 TABLET ORAL at 02:24

## 2017-12-02 RX ADMIN — HYDROMORPHONE HYDROCHLORIDE 0.5 MG: 10 INJECTION INTRAMUSCULAR; INTRAVENOUS; SUBCUTANEOUS at 09:59

## 2017-12-02 RX ADMIN — ONDANSETRON 4 MG: 2 INJECTION INTRAMUSCULAR; INTRAVENOUS at 10:07

## 2017-12-02 RX ADMIN — HYDROMORPHONE HYDROCHLORIDE 0.5 MG: 10 INJECTION INTRAMUSCULAR; INTRAVENOUS; SUBCUTANEOUS at 14:56

## 2017-12-02 RX ADMIN — SODIUM CHLORIDE 75 ML/HR: 9 INJECTION, SOLUTION INTRAVENOUS at 17:11

## 2017-12-02 RX ADMIN — FAMOTIDINE 20 MG: 10 INJECTION INTRAVENOUS at 21:00

## 2017-12-02 RX ADMIN — HYDROCODONE BITARTRATE AND ACETAMINOPHEN 1 TABLET: 5; 325 TABLET ORAL at 16:52

## 2017-12-02 RX ADMIN — FAMOTIDINE 20 MG: 10 INJECTION INTRAVENOUS at 08:18

## 2017-12-02 RX ADMIN — HYDROCODONE BITARTRATE AND ACETAMINOPHEN 1 TABLET: 5; 325 TABLET ORAL at 23:01

## 2017-12-02 RX ADMIN — POTASSIUM CHLORIDE 10 MEQ: 7.46 INJECTION, SOLUTION INTRAVENOUS at 09:54

## 2017-12-02 RX ADMIN — POTASSIUM CHLORIDE 10 MEQ: 7.46 INJECTION, SOLUTION INTRAVENOUS at 06:40

## 2017-12-02 NOTE — SIGNIFICANT NOTE
12/02/17 [facility]   Rehab Treatment   Discipline speech language pathologist   Rehab Evaluation   Evaluation Not Performed unable to evaluate, medical status change;other (see comments)  (Noted CT showed hemorrhagic transformation post-tPA. Holding swallow eval for now unless cleared per neruo.)

## 2017-12-02 NOTE — SIGNIFICANT NOTE
12/02/17 0805   Rehab Treatment   Discipline occupational therapist   Rehab Evaluation   Evaluation Not Performed other (see comments)  (pt s/p TPA yesterday late afternoon. pt not 24 hrs post TPA. Check back Monday. 0805 am)   Recommendation   OT - Next Appointment 12/11/17

## 2017-12-02 NOTE — PLAN OF CARE
Problem: Stroke (Ischemic) (Adult)  Goal: Signs and Symptoms of Listed Potential Problems Will be Absent or Manageable (Stroke)  Outcome: Ongoing (interventions implemented as appropriate)    Problem: Patient Care Overview (Adult)  Goal: Plan of Care Review  Outcome: Ongoing (interventions implemented as appropriate)    12/02/17 0714   Coping/Psychosocial Response Interventions   Plan Of Care Reviewed With patient;daughter   Patient Care Overview   Progress improving   Outcome Evaluation   Outcome Summary/Follow up Plan Pt stable overnight. Still has facial droop with movement so not given PO meds overnight. Complains of headache. Temperature of 101 noted. Potassium placement in progress. Labs pending.

## 2017-12-02 NOTE — PROGRESS NOTES
Neuro    Reviewed CT and there is some blood in infarct.  No significant mass effect and would not expect this to result in neurologic decline, which it has not.    Will hold ASA for now.    No other treatment required for above blood.  Don't need to try to reverse TPA at his point in time.  Will proceed with MRI and echo tomorrow as scheduled.    JYOTI Bunch MD

## 2017-12-02 NOTE — SIGNIFICANT NOTE
12/02/17 1040   Rehab Treatment   Discipline physical therapist   Rehab Evaluation   Evaluation Not Performed other (see comments)  (Per BEV Loco patient is not yet 24 hours post TPA, will check back tomorrow)   Recommendation   PT - Next Appointment 12/03/17

## 2017-12-02 NOTE — PROGRESS NOTES
Dr. JUANA Mathews    [facility] INTENSIVE CARE    12/2/2017    Patient ID:  Name:  Angelita Brambila  MRN:  [id number]  1965  52 y.o.  female            CC/Reason for visit: Follow-up for acute stroke    HPI: The patient complains of a mild headache.    Vitals:  Vitals:    12/02/17 0747 12/02/17 0802 12/02/17 0817 12/02/17 0832   BP: 132/86 116/75 121/76 139/87   BP Location:       Patient Position:       Pulse: 81 79 76 81   Resp:       Temp: 98.4 °F (36.9 °C)      TempSrc: Oral      SpO2: 92% 94% 92% 94%   Weight:       Height:               Body mass index is 24.3 kg/(m^2).    Intake/Output Summary (Last 24 hours) at 12/02/17 1101  Last data filed at 12/02/17 0445   Gross per 24 hour   Intake              300 ml   Output              975 ml   Net             -675 ml       Exam:  Neurologic exam: The patient is awake, alert and oriented ×3.  There is no dysarthria or aphasia.  She has some deficit in the left visual field.  She has some weakness in the left upper extremity but does lift it with some drift.  She has some sensory deficit in the left arm  NECK:  Supple, midline trachea  LUNGS: Clear breath sounds bilat, nonlabored breathing  CV:  Normal S1S2, without murmur, no edema  ABD:  Non tender, no enlarged liver or masses  EXT:  No cyanosis or clubbing    Scheduled meds:    atorvastatin 80 mg Oral Nightly   famotidine 20 mg Intravenous Q12H     IV meds:                        sodium chloride 75 mL/hr Last Rate: 75 mL/hr (12/01/17 0084)       Data Review:   I reviewed the patient's medications and new clinical results.  Lab Results   Component Value Date    CALCIUM 8.9 12/01/2017       Results from last 7 days  Lab Units 12/01/17  1537   SODIUM mmol/L 142   POTASSIUM mmol/L 2.9*   CHLORIDE mmol/L 98   CO2 mmol/L 30.0*   BUN mg/dL 5*   CREATININE mg/dL 0.75   CALCIUM mg/dL 8.9   BILIRUBIN mg/dL 0.8   ALK PHOS U/L 88   ALT (SGPT) U/L 24   AST (SGOT) U/L 26   GLUCOSE mg/dL 106*   WBC 10*3/mm3 9.02    HEMOGLOBIN g/dL 16.4*   PLATELETS 10*3/mm3 300   INR  0.96         ASSESSMENT:   Right middle cerebral artery  CVA (cerebral vascular accident)  Hemorrhagic transformation of CVA  Left hemiparesis  Hypertension  Hypokalemia      PLAN:  We will continue to observe the patient closely.  Remain in ICU for now.  She does have some hemorrhagic transformation on recent CT of the head.    As per Dr. Bunch, we will stop aspirin for now.  She is status post TPA from yesterday.        Fadi Mathews MD  12/2/2017

## 2017-12-02 NOTE — THERAPY EVALUATION
Acute Care - Speech Language Pathology   Swallow Initial Evaluation [facility]     Patient Name: Angelita Brambila  : 1965  MRN: [id number]  Today's Date: 2017               Admit Date: 2017    SPEECH-LANGUAGE PATHOLOGY EVALUATION - SWALLOW  Subjective: The patient was seen on this date for a Clinical Swallow evaluation.  Patient was alert and cooperative. Dense L facial weakness. Pt stimulable to look L when cued.  Significant history: R CVA, s/p tPA. Pt has had severe N/V x2-3 weeks, with suspected gall bladder issue per pt/daughter's report. Minimal PO intake reported other than soups, applesauce, and fluids.   Objective: Textures given included ice, thin liquid, nectar thick liquid, puree consistency and regular consistency.  Assessment: Difficulties were noted with thin liquid.  Observations:  cough x1 only. Pt took tiny bites of dry solid for fear of nausea, so difficult to assess regular consistency. SLP discussed VFSS with pt/daughter, as it was uncertain whether she could tolerate barium. She indicated she would like to try before opting for a FEES.   SLP Findings:  Patient presents with mild oropharyngeal dysphagia, without esophageal component.   Recommendations: Diet Textures: nectar thick liquid, mechanical soft consistency with no mixed textures food.  Medications should be taken whole with thickened liquids or puree. May have water between meals after oral care, under staff or family supervision and with the recommended strategies for safe swallowing.   Recommended Strategies: Upright for PO, small bites and sips and no straw. Oral care before breakfast, after all meals and PRN.  Other Recommended Evaluations: VFSS and Cognitive-Linguistic Evaluation    Dysphagia therapy is recommended. Rationale: If indicated per VFSS.        Visit Dx:     ICD-10-CM ICD-9-CM   1. Cerebrovascular accident (CVA), unspecified mechanism I63.9 434.91   2. Hypokalemia E87.6 276.8     Patient Active  Problem List   Diagnosis   • Complete tear of right rotator cuff   • Precordial pain   • Dyspnea on exertion   • Palpitations   • Essential hypertension   • Hyperlipidemia   • Tobacco abuse   • CVA (cerebral vascular accident)     Past Medical History:   Diagnosis Date   • Allergic rhinitis    • Ankle swelling    • Anxiety    • Arthritis    • Asthma    • Attention deficit hyperactivity disorder    • Back pain    • Chest pain    • CHF (congestive heart failure)    • COPD (chronic obstructive pulmonary disease)    • Coronary artery disease    • Depression    • Esophageal reflux    • High cholesterol     Reported cholesterol level was high   • History of colonoscopy     Complete colonoscopy   • Hypercholesterolemia    • Hypertension    • IBS (irritable bowel syndrome)    • Insomnia    • Osteopenia    • Palpitations    • Rotator cuff tendonitis    • Seborrheic dermatitis    • Stroke    • Tendinitis, de Quervain's    • Tobacco use    • Vitamin B12 deficiency    • Vitamin D deficiency      Past Surgical History:   Procedure Laterality Date   • COLONOSCOPY     • DIAGNOSTIC LAPAROSCOPY EXPLORATORY LAPAROTOMY     • ELBOW ARTHROPLASTY Right     Elbow surgery   • FRACTURE SURGERY     • HYSTERECTOMY  1999    Including both ovaries   • TONSILLECTOMY     • WRIST SURGERY Left           SWALLOW EVALUATION (last 72 hours)      Swallow Evaluation       12/02/17 1400                Rehab Evaluation    Document Type evaluation  -CP        Subjective Information no complaints  -CP        General Information    Patient Profile Review yes  -CP        Current Diet Limitations NPO  -CP        Prior Level of Function- Communication functional in all spheres  -CP        Prior Level of Function- Swallowing no diet consistency restrictions  -CP        Plans/Goals Discussed With patient;family  -CP        Barriers to Rehab none identified  -CP        Clinical Impression    Patient's Goals For Discharge return to regular diet  -CP        SLP  Swallowing Diagnosis mild dysphagia  -CP        Rehab Potential/Prognosis, Swallowing good, to achieve stated therapy goals  -CP        Criteria for Skilled Therapeutic Interventions Met skilled criteria for dysphagia intervention met  -CP        FCM, Swallowing 4-->Level 4  -CP        Therapy Frequency PRN  -CP        Predicted Duration Therapy Interv (days) until discharge  -CP        Expected Duration Therapy Session (min) 15-30 minutes  -CP        SLP Diet Recommendation IV - mechanical soft, no mixed consistencies;nectar/syrup-thick liquids  -CP        Recommended Diagnostics VFSS (MBS)  -CP        Recommended Feeding/Eating Techniques maintain upright posture during/after eating for 30 mins;small sips/bites  -CP        SLP Rec. for Method of Medication Administration meds whole in pudding/applesauce;meds whole with thickened liquid  -CP        Monitor For Signs Of Aspiration cough;gurgly voice;throat clearing  -CP        Anticipated Discharge Disposition other (see comments)   unknown  -CP        Cognitive Assessment/Intervention    Current Cognitive/Communication Assessment --   L neglect  -CP        Oral Motor Structure and Function    Oral Motor Anatomy and Physiology patient demonstrates anatomy that is WNL  -CP        Dentition Assessment present and adequate  -CP        Secretion Management WNL/WFL  -CP        Mucosal Quality dry  -CP        Oral Musculature General Assessment oral labial impairment  -CP        Oral Labial Strength and Mobility left labial droop  -CP          User Key  (r) = Recorded By, (t) = Taken By, (c) = Cosigned By    Initials Name Effective Dates    CP Mei Park MS CCC-SLP 04/13/15 -         EDUCATION  The patient has been educated in the following areas:   Dysphagia (Swallowing Impairment) Oral Care/Hydration Modified Diet Instruction.    SLP Recommendation and Plan  SLP Swallowing Diagnosis: mild dysphagia  SLP Diet Recommendation: IV - mechanical soft, no mixed  consistencies, nectar/syrup-thick liquids  Recommended Feeding/Eating Techniques: maintain upright posture during/after eating for 30 mins, small sips/bites  SLP Rec. for Method of Medication Administration: meds whole in pudding/applesauce, meds whole with thickened liquid  Monitor For Signs Of Aspiration: cough, gurgly voice, throat clearing  Recommended Diagnostics: VFSS ([facility] [place])  Criteria for Skilled Therapeutic Interventions Met: skilled criteria for dysphagia intervention met  Anticipated Discharge Disposition: other (see comments) (unknown)  Rehab Potential/Prognosis, Swallowing: good, to achieve stated therapy goals  Therapy Frequency: PRN             Plan of Care Review  Plan Of Care Reviewed With: patient, family  Outcome Summary/Follow up Plan: Bedside swallow eval completed. Suggest conservative diet initiation of mech soft/NTL. See report for details.  VFSS to be attempted Monday.          IP SLP Goals       12/02/17 1446          Safely Consume Diet    Safely Consume Diet- SLP, Date Established 12/02/17  -CP      Safely Consume Diet- SLP, Time to Achieve by discharge  -CP        User Key  (r) = Recorded By, (t) = Taken By, (c) = Cosigned By    Initials Name Provider Type    CP Mei Park MS CCC-SLP Speech and Language Pathologist             SLP Outcome Measures (last 72 hours)      SLP Outcome Measures       12/02/17 1400          SLP Outcome Measures    Outcome Measure Used? Adult NOMS  -CP      FCM Scores    FCM Chosen Swallowing  -CP      Swallowing FCM Score 4  -CP        User Key  (r) = Recorded By, (t) = Taken By, (c) = Cosigned By    Initials Name Effective Dates    CP Mei Park MS CCC-SLP 04/13/15 -            Time Calculation:         Time Calculation- SLP       12/02/17 1449          Time Calculation- SLP    SLP Start Time 1300  -CP      SLP Stop Time 1400  -CP      SLP Time Calculation (min) 60 min  -CP      SLP Received On 12/02/17  -CP        User Key  (r) = Recorded By, (t) =  Taken By, (c) = Cosigned By    Initials Name Provider Type    CP Mei Park, MS CCC-SLP Speech and Language Pathologist          Therapy Charges for Today     Code Description Service Date Service Provider Modifiers Qty    [id number] HC ST EVAL ORAL PHARYNG SWALLOW 4 12/2/2017 Mei Park MS CCC-SLP GN 1               Mei Park MS CCC-SLP  12/2/2017

## 2017-12-02 NOTE — PLAN OF CARE
Problem: Patient Care Overview (Adult)  Goal: Plan of Care Review    12/02/17 1446   Coping/Psychosocial Response Interventions   Plan Of Care Reviewed With patient;family   Outcome Evaluation   Outcome Summary/Follow up Plan Bedside swallow eval completed. Suggest conservative diet initiation of mech soft/NTL. See report for details. VFSS to be attempted Monday.         Problem: Inpatient SLP  Goal: Dysphagia- Patient will safely consume diet as per recommendation with no signs/symptoms of aspiration    12/02/17 1446   Safely Consume Diet   Safely Consume Diet- SLP, Date Established 12/02/17   Safely Consume Diet- SLP, Time to Achieve by discharge

## 2017-12-02 NOTE — PROGRESS NOTES
"Progress Note      PATIENT Angelita Brambila    1965   MRN [id number]   ADMIT DATE 2017   LENGTH OF STAY 1 days   ATTENDING Aly Drew MD       CHIEF COMPLAINT: Neuro Deficit(s)      DIAGNOSIS: Hypokalemia [E87.6]  CVA (cerebral vascular accident) [I63.9]  Cerebrovascular accident (CVA), unspecified mechanism [I63.9]    HISTORY OF PRESENT ILLNESS: The patient complains of right-sided headache.  She will get intermittent headaches which she blames on allergies but this feels different.  He rates it as 10/10.  He has no other complaints.  Nurses have noted no change in her neurologic exam since coming to the ICU.    PHYSICAL EXAMINATION:  GENERAL: Reveals a man/woman who appears his/her stated age, in no acute distress.  VITAL SIGNS: /87  Pulse 81  Temp 98.4 °F (36.9 °C) (Oral)   Resp 17  Ht 65\" (165.1 cm)  Wt 146 lb (66.2 kg)  LMP Comment: hysterectomy   SpO2 94%  BMI 24.3 kg/m2  NECK: Carotids are equal without bruits.   HEART: Regular rate and rhythm with no significant murmur or gallop.   EXTREMITIES: Nonedematous. Pulses are intact. There is no rash. There is no hepatosplenomegaly.   NEUROLOGIC: He is awake and alert. He is oriented x3. There is no aphasia or significant dysarthria.  She has a left homonymous hemianopsia.  She has a left upper motor neuron facial weakness.  Left arm is weak but can clearly defy gravity but does drift.  There is mild weakness of the left psoas but all the muscle groups in the left lower extremity are normal.  Very severe sensory deficit in the left arm and moderate in the left leg.      DIAGNOSTIC DATA: none    IMPRESSION AND PLAN: The patient is unchanged since she arrived in the emergency room.  He has tolerated TPA with no significant side effects.  I suspect the HA is from the CVA but given the recent TPA will got CT now to r/o blood and then do CT at 24 hour tasia also.    MRI and Echo tomorrow.    Will ask Dr. Monson to see regarding carotid " stenosis and discussed with him.    Will x-ray right wrist because of patient concern mainly.    Timothy Bunch MD  12/2/2017  9:42 AM

## 2017-12-03 ENCOUNTER — APPOINTMENT (OUTPATIENT)
Dept: CARDIOLOGY | Facility: HOSPITAL | Age: 52
End: 2017-12-03
Attending: PSYCHIATRY & NEUROLOGY

## 2017-12-03 ENCOUNTER — APPOINTMENT (OUTPATIENT)
Dept: MRI IMAGING | Facility: HOSPITAL | Age: 52
End: 2017-12-03

## 2017-12-03 LAB
ANION GAP SERPL CALCULATED.3IONS-SCNC: 13.2 MMOL/L
AORTIC DIMENSIONLESS INDEX: 0.7 (DI)
BH CV ECHO MEAS - ACS: 1.9 CM
BH CV ECHO MEAS - AO MAX PG: 5 MMHG
BH CV ECHO MEAS - AO MEAN PG (FULL): 1 MMHG
BH CV ECHO MEAS - AO MEAN PG: 3 MMHG
BH CV ECHO MEAS - AO ROOT AREA (BSA CORRECTED): 1.9
BH CV ECHO MEAS - AO ROOT AREA: 8.6 CM^2
BH CV ECHO MEAS - AO ROOT DIAM: 3.3 CM
BH CV ECHO MEAS - AO V2 MAX: 115 CM/SEC
BH CV ECHO MEAS - AO V2 MEAN: 81 CM/SEC
BH CV ECHO MEAS - AO V2 VTI: 21.2 CM
BH CV ECHO MEAS - AVA(I,A): 2.2 CM^2
BH CV ECHO MEAS - AVA(I,D): 2.2 CM^2
BH CV ECHO MEAS - BSA(HAYCOCK): 1.8 M^2
BH CV ECHO MEAS - BSA: 1.7 M^2
BH CV ECHO MEAS - BZI_BMI: 24.3 KILOGRAMS/M^2
BH CV ECHO MEAS - BZI_METRIC_HEIGHT: 165.1 CM
BH CV ECHO MEAS - BZI_METRIC_WEIGHT: 66.2 KG
BH CV ECHO MEAS - CONTRAST EF (2CH): 59.6 ML/M^2
BH CV ECHO MEAS - CONTRAST EF 4CH: 62.3 ML/M^2
BH CV ECHO MEAS - EDV(CUBED): 68.9 ML
BH CV ECHO MEAS - EDV(MOD-SP2): 52 ML
BH CV ECHO MEAS - EDV(MOD-SP4): 53 ML
BH CV ECHO MEAS - EDV(TEICH): 74.2 ML
BH CV ECHO MEAS - EF(CUBED): 64.6 %
BH CV ECHO MEAS - EF(MOD-SP2): 59.6 %
BH CV ECHO MEAS - EF(MOD-SP4): 62.3 %
BH CV ECHO MEAS - EF(TEICH): 56.6 %
BH CV ECHO MEAS - ESV(CUBED): 24.4 ML
BH CV ECHO MEAS - ESV(MOD-SP2): 21 ML
BH CV ECHO MEAS - ESV(MOD-SP4): 20 ML
BH CV ECHO MEAS - ESV(TEICH): 32.2 ML
BH CV ECHO MEAS - FS: 29.3 %
BH CV ECHO MEAS - IVS/LVPW: 0.82
BH CV ECHO MEAS - IVSD: 0.9 CM
BH CV ECHO MEAS - LAT PEAK E' VEL: 9 CM/SEC
BH CV ECHO MEAS - LV DIASTOLIC VOL/BSA (35-75): 30.6 ML/M^2
BH CV ECHO MEAS - LV MASS(C)D: 132.1 GRAMS
BH CV ECHO MEAS - LV MASS(C)DI: 76.3 GRAMS/M^2
BH CV ECHO MEAS - LV MEAN PG: 2 MMHG
BH CV ECHO MEAS - LV SYSTOLIC VOL/BSA (12-30): 11.6 ML/M^2
BH CV ECHO MEAS - LV V1 MAX: 83 CM/SEC
BH CV ECHO MEAS - LV V1 MEAN: 56.1 CM/SEC
BH CV ECHO MEAS - LV V1 VTI: 14.8 CM
BH CV ECHO MEAS - LVIDD: 4.1 CM
BH CV ECHO MEAS - LVIDS: 2.9 CM
BH CV ECHO MEAS - LVLD AP2: 7.1 CM
BH CV ECHO MEAS - LVLD AP4: 7.4 CM
BH CV ECHO MEAS - LVLS AP2: 5.7 CM
BH CV ECHO MEAS - LVLS AP4: 5.9 CM
BH CV ECHO MEAS - LVOT AREA (M): 3.1 CM^2
BH CV ECHO MEAS - LVOT AREA: 3.1 CM^2
BH CV ECHO MEAS - LVOT DIAM: 2 CM
BH CV ECHO MEAS - LVPWD: 1.1 CM
BH CV ECHO MEAS - MED PEAK E' VEL: 6 CM/SEC
BH CV ECHO MEAS - MV A DUR: 0.13 SEC
BH CV ECHO MEAS - MV A MAX VEL: 72.6 CM/SEC
BH CV ECHO MEAS - MV DEC SLOPE: 414 CM/SEC^2
BH CV ECHO MEAS - MV DEC TIME: 0.2 SEC
BH CV ECHO MEAS - MV E MAX VEL: 70.1 CM/SEC
BH CV ECHO MEAS - MV E/A: 0.97
BH CV ECHO MEAS - MV MAX PG: 3 MMHG
BH CV ECHO MEAS - MV MEAN PG: 2 MMHG
BH CV ECHO MEAS - MV P1/2T MAX VEL: 79.9 CM/SEC
BH CV ECHO MEAS - MV P1/2T: 56.5 MSEC
BH CV ECHO MEAS - MV V2 MEAN: 62 CM/SEC
BH CV ECHO MEAS - MV V2 VTI: 18 CM
BH CV ECHO MEAS - MVA P1/2T LCG: 2.8 CM^2
BH CV ECHO MEAS - MVA(P1/2T): 3.9 CM^2
BH CV ECHO MEAS - MVA(VTI): 2.6 CM^2
BH CV ECHO MEAS - PA ACC SLOPE: 656 CM/SEC^2
BH CV ECHO MEAS - PA ACC TIME: 0.16 SEC
BH CV ECHO MEAS - PA MAX PG (FULL): 0.41 MMHG
BH CV ECHO MEAS - PA MAX PG: 4 MMHG
BH CV ECHO MEAS - PA PR(ACCEL): 9.3 MMHG
BH CV ECHO MEAS - PA V2 MAX: 100 CM/SEC
BH CV ECHO MEAS - PULM A REVS DUR: 0.09 SEC
BH CV ECHO MEAS - PULM A REVS VEL: 31.1 CM/SEC
BH CV ECHO MEAS - PULM DIAS VEL: 42.4 CM/SEC
BH CV ECHO MEAS - PULM S/D: 1.5
BH CV ECHO MEAS - PULM SYS VEL: 64.2 CM/SEC
BH CV ECHO MEAS - PVA(V,A): 2.4 CM^2
BH CV ECHO MEAS - PVA(V,D): 2.4 CM^2
BH CV ECHO MEAS - QP/QS: 1.2
BH CV ECHO MEAS - RV MAX PG: 3.6 MMHG
BH CV ECHO MEAS - RV MEAN PG: 2 MMHG
BH CV ECHO MEAS - RV V1 MAX: 94.7 CM/SEC
BH CV ECHO MEAS - RV V1 MEAN: 66.3 CM/SEC
BH CV ECHO MEAS - RV V1 VTI: 22.1 CM
BH CV ECHO MEAS - RVOT AREA: 2.5 CM^2
BH CV ECHO MEAS - RVOT DIAM: 1.8 CM
BH CV ECHO MEAS - RVSP: 3 MMHG
BH CV ECHO MEAS - SI(AO): 104.8 ML/M^2
BH CV ECHO MEAS - SI(CUBED): 25.7 ML/M^2
BH CV ECHO MEAS - SI(LVOT): 26.9 ML/M^2
BH CV ECHO MEAS - SI(MOD-SP2): 17.9 ML/M^2
BH CV ECHO MEAS - SI(MOD-SP4): 19.1 ML/M^2
BH CV ECHO MEAS - SI(TEICH): 24.3 ML/M^2
BH CV ECHO MEAS - SV(AO): 181.3 ML
BH CV ECHO MEAS - SV(CUBED): 44.5 ML
BH CV ECHO MEAS - SV(LVOT): 46.5 ML
BH CV ECHO MEAS - SV(MOD-SP2): 31 ML
BH CV ECHO MEAS - SV(MOD-SP4): 33 ML
BH CV ECHO MEAS - SV(RVOT): 56.2 ML
BH CV ECHO MEAS - SV(TEICH): 42 ML
BH CV ECHO MEAS - TAPSE (>1.6): 1.9 CM2
BH CV ECHO MEAS - TR MAX VEL: 170 CM/SEC
BH CV VAS BP RIGHT ARM: NORMAL MMHG
BH CV XLRA - RV BASE: 2.1 CM
BH CV XLRA - TDI S': 12 CM/SEC
BUN BLD-MCNC: 6 MG/DL (ref 6–20)
BUN/CREAT SERPL: 9.7 (ref 7–25)
CALCIUM SPEC-SCNC: 7.9 MG/DL (ref 8.6–10.5)
CHLORIDE SERPL-SCNC: 100 MMOL/L (ref 98–107)
CO2 SERPL-SCNC: 23.8 MMOL/L (ref 22–29)
CREAT BLD-MCNC: 0.62 MG/DL (ref 0.57–1)
E/E' RATIO: 10
GFR SERPL CREATININE-BSD FRML MDRD: 100 ML/MIN/1.73
GLUCOSE BLD-MCNC: 105 MG/DL (ref 65–99)
GLUCOSE BLDC GLUCOMTR-MCNC: 131 MG/DL (ref 70–130)
GLUCOSE BLDC GLUCOMTR-MCNC: 132 MG/DL (ref 70–130)
LEFT ATRIUM VOLUME INDEX: 28 ML/M2
MAGNESIUM SERPL-MCNC: 3.2 MG/DL (ref 1.6–2.6)
MAXIMAL PREDICTED HEART RATE: 166 BPM
POTASSIUM BLD-SCNC: 3.9 MMOL/L (ref 3.5–5.2)
SODIUM BLD-SCNC: 137 MMOL/L (ref 136–145)
STRESS TARGET HR: 141 BPM

## 2017-12-03 PROCEDURE — 0 GADOBENATE DIMEGLUMINE 529 MG/ML SOLUTION: Performed by: INTERNAL MEDICINE

## 2017-12-03 PROCEDURE — 93306 TTE W/DOPPLER COMPLETE: CPT | Performed by: INTERNAL MEDICINE

## 2017-12-03 PROCEDURE — 97162 PT EVAL MOD COMPLEX 30 MIN: CPT

## 2017-12-03 PROCEDURE — 93306 TTE W/DOPPLER COMPLETE: CPT

## 2017-12-03 PROCEDURE — 82962 GLUCOSE BLOOD TEST: CPT

## 2017-12-03 PROCEDURE — A9577 INJ MULTIHANCE: HCPCS | Performed by: INTERNAL MEDICINE

## 2017-12-03 PROCEDURE — 99233 SBSQ HOSP IP/OBS HIGH 50: CPT | Performed by: PSYCHIATRY & NEUROLOGY

## 2017-12-03 PROCEDURE — 97110 THERAPEUTIC EXERCISES: CPT

## 2017-12-03 PROCEDURE — 80048 BASIC METABOLIC PNL TOTAL CA: CPT | Performed by: INTERNAL MEDICINE

## 2017-12-03 PROCEDURE — 70553 MRI BRAIN STEM W/O & W/DYE: CPT

## 2017-12-03 PROCEDURE — 83735 ASSAY OF MAGNESIUM: CPT | Performed by: INTERNAL MEDICINE

## 2017-12-03 RX ORDER — ROPINIROLE 1 MG/1
1 TABLET, FILM COATED ORAL 2 TIMES DAILY PRN
Status: DISCONTINUED | OUTPATIENT
Start: 2017-12-03 | End: 2017-12-13 | Stop reason: HOSPADM

## 2017-12-03 RX ORDER — AMLODIPINE BESYLATE 5 MG/1
5 TABLET ORAL DAILY
Status: DISCONTINUED | OUTPATIENT
Start: 2017-12-03 | End: 2017-12-05

## 2017-12-03 RX ADMIN — FAMOTIDINE 20 MG: 10 INJECTION INTRAVENOUS at 08:13

## 2017-12-03 RX ADMIN — ROPINIROLE 1 MG: 1 TABLET, FILM COATED ORAL at 08:13

## 2017-12-03 RX ADMIN — FAMOTIDINE 20 MG: 10 INJECTION INTRAVENOUS at 21:40

## 2017-12-03 RX ADMIN — AMLODIPINE BESYLATE 5 MG: 5 TABLET ORAL at 14:15

## 2017-12-03 RX ADMIN — HYDROCODONE BITARTRATE AND ACETAMINOPHEN 1 TABLET: 5; 325 TABLET ORAL at 12:05

## 2017-12-03 RX ADMIN — HYDROCODONE BITARTRATE AND ACETAMINOPHEN 1 TABLET: 5; 325 TABLET ORAL at 19:39

## 2017-12-03 RX ADMIN — ROPINIROLE 1 MG: 1 TABLET, FILM COATED ORAL at 14:15

## 2017-12-03 RX ADMIN — ATORVASTATIN CALCIUM 80 MG: 80 TABLET, FILM COATED ORAL at 21:05

## 2017-12-03 RX ADMIN — GADOBENATE DIMEGLUMINE 13 ML: 529 INJECTION, SOLUTION INTRAVENOUS at 10:12

## 2017-12-03 RX ADMIN — HYDROCODONE BITARTRATE AND ACETAMINOPHEN 1 TABLET: 5; 325 TABLET ORAL at 05:16

## 2017-12-03 RX ADMIN — ROPINIROLE 1 MG: 1 TABLET, FILM COATED ORAL at 18:46

## 2017-12-03 RX ADMIN — SODIUM CHLORIDE 75 ML/HR: 9 INJECTION, SOLUTION INTRAVENOUS at 18:46

## 2017-12-03 NOTE — PLAN OF CARE
Problem: Patient Care Overview (Adult)  Goal: Plan of Care Review  Outcome: Ongoing (interventions implemented as appropriate)    12/03/17 0816   Coping/Psychosocial Response Interventions   Plan Of Care Reviewed With patient   Patient Care Overview   Progress progress toward functional goals as expected   Outcome Evaluation   Outcome Summary/Follow up Plan No neurological change overnight. Tolerates lortab well with great improvement in headache. Continues to have sensation issues in LUE. Wrist x-ray inconcclusive for r/o fracture.

## 2017-12-03 NOTE — THERAPY EVALUATION
Acute Care - Physical Therapy Initial Evaluation  [facility]     Patient Name: Angelita Brambila  : 1965  MRN: [id number]  Today's Date: 12/3/2017   Onset of Illness/Injury or Date of Surgery Date: 17  Date of Referral to PT: 17  Referring Physician: Rajendra      Admit Date: 2017     Visit Dx:    ICD-10-CM ICD-9-CM   1. Cerebrovascular accident (CVA), unspecified mechanism I63.9 434.91   2. Hypokalemia E87.6 276.8   3. Hemiparesis affecting left side as late effect of cerebrovascular accident I69.354 438.20     Patient Active Problem List   Diagnosis   • Complete tear of right rotator cuff   • Precordial pain   • Dyspnea on exertion   • Palpitations   • Essential hypertension   • Hyperlipidemia   • Tobacco abuse   • CVA (cerebral vascular accident)     Past Medical History:   Diagnosis Date   • Allergic rhinitis    • Ankle swelling    • Anxiety    • Arthritis    • Asthma    • Attention deficit hyperactivity disorder    • Back pain    • Chest pain    • CHF (congestive heart failure)    • COPD (chronic obstructive pulmonary disease)    • Coronary artery disease    • Depression    • Esophageal reflux    • High cholesterol     Reported cholesterol level was high   • History of colonoscopy     Complete colonoscopy   • Hypercholesterolemia    • Hypertension    • IBS (irritable bowel syndrome)    • Insomnia    • Osteopenia    • Palpitations    • Rotator cuff tendonitis    • Seborrheic dermatitis    • Stroke    • Tendinitis, de Quervain's    • Tobacco use    • Vitamin B12 deficiency    • Vitamin D deficiency      Past Surgical History:   Procedure Laterality Date   • COLONOSCOPY     • DIAGNOSTIC LAPAROSCOPY EXPLORATORY LAPAROTOMY     • ELBOW ARTHROPLASTY Right     Elbow surgery   • FRACTURE SURGERY     • HYSTERECTOMY      Including both ovaries   • TONSILLECTOMY     • WRIST SURGERY Left           PT ASSESSMENT (last 72 hours)      PT Evaluation       17 1117 17 1025    Rehab  Evaluation    Document Type evaluation  -PC     Subjective Information agree to therapy;complains of;pain  -PC     Evaluation Not Performed  patient unavailable for evaluation   Patient off floor for MRI- will check back tomorrow  -KH    Patient Effort, Rehab Treatment good  -PC     Symptoms Noted Comment headache, R wrist pain  -PC     General Information    Patient Profile Review yes  -PC     Onset of Illness/Injury or Date of Surgery Date 12/01/17  -     Referring Physician Sayied  -PC     General Observations pt is in bed, no acute distress, bruising present R wrist  -PC     Pertinent History Of Current Problem acute R MCA CVA, received TPA, had hemorrhagic transformation, also with suspected R wrist fracture from fall  -PC     Precautions/Limitations fall precautions  -PC     Prior Level of Function independent:  -PC     Living Environment    Lives With alone  -     Living Environment Comment pt lives in a house on [facility]  -     Clinical Impression    Date of Referral to PT 12/03/17  -     Criteria for Skilled Therapeutic Interventions Met yes;treatment indicated  -PC     Impairments Found (describe specific impairments) gait, locomotion, and balance;neuromotor development and sensory integration  -     Rehab Potential good, to achieve stated therapy goals  -PC     Pain Assessment    Pain Assessment 0-10  -PC     Pain Score 5  -PC     Pain Location Head   pain also R wrist from fall, suspected fx  -PC     Vision Assessment/Intervention    Visual Impairment left neglect;inattention to the left;needs cues to attend visually  -     Cognitive Assessment/Intervention    Current Cognitive/Communication Assessment functional  -     Orientation Status oriented x 4  -PC     Follows Commands/Answers Questions 100% of the time  -     Personal Safety decreased insight to deficits;mild impairment  -     Personal Safety Interventions fall prevention program maintained;gait belt  -     ROM (Range  of Motion)    General ROM no range of motion deficits identified  -     MMT (Manual Muscle Testing)    General MMT Assessment Detail R side WNL, L UE 3/4, RLE 4-/5  -PC     Bed Mobility, Assessment/Treatment    Bed Mob, Supine to Sit, Maxwell minimum assist (75% patient effort)  -     Transfer Assessment/Treatment    Transfers, Sit-Stand Maxwell minimum assist (75% patient effort)  -     Transfers, Stand-Sit Maxwell minimum assist (75% patient effort)  -     Gait Assessment/Treatment    Gait, Maxwell Level hand held assist;minimum assist (75% patient effort);2 person assist required  -     Gait, Distance (Feet) 80  -PC     Gait, Gait Deviations leans to the left;ronnell decreased;decreased heel strike;ataxia;narrow base;step length decreased  -     Gait, Safety Issues weight-shifting ability decreased;step length decreased  -     Gait, Impairments strength decreased;impaired balance;coordination impaired;motor control impaired;impaired vision  -     Gait, Comment did not hold R hand, held elbow due to R wrist fx, not splinted yet  -     Motor Skills/Interventions    Motor Response Observations --   dec coordination L side, dec motor planning  -     Additional Documentation Balance Skills Training (Group)  -     Balance Skills Training    Sitting-Level of Assistance Close supervision  -     Sitting-Balance Support Left upper extremity supported;Right upper extremity supported  -     Sitting-Balance Activities Trunk control activities  -     Sitting # of Minutes 4  -PC     Standing-Level of Assistance Minimum assistance;x2  -PC     Static Standing Balance Support Left upper extremity supported;Right upper extremity supported  -     Gait Balance-Level of Assistance Moderate assistance;x2  -PC     Gait Balance Support Right upper extremity supported;Left upper extremity supported  -     Positioning and Restraints    Pre-Treatment Position in bed  -PC     Post Treatment  Position bs  -PC     On BS commode sitting;call light within reach;encouraged to call for assist;with family/caregiver;with nsg  -PC       12/03/17 0745 12/03/17 0000    Sensory Assessment/Intervention    LUE Light Touch severe impairment  -EVELINE severe impairment  -JT    LUE Sharp/Dull Discrimination severe impairment  -EVELINE       12/02/17 2000 12/02/17 1857    Sensory Assessment/Intervention    Light Touch  LUE  -SG    LUE Light Touch severe impairment  -JT severe impairment  -SG    Sharp/Dull Discrimination  LUE  -SG    LUE Sharp/Dull Discrimination  moderate impairment  -SG    Deep Pressure  LUE  -SG    LUE Deep Pressure  mild impairment  -SG      12/02/17 1800 12/02/17 1700    Sensory Assessment/Intervention    Light Touch LUE  -SG LUE  -SG    LUE Light Touch severe impairment  -SG severe impairment  -SG    Sharp/Dull Discrimination LUE  -SG LUE  -SG    LUE Sharp/Dull Discrimination moderate impairment  -SG moderate impairment  -SG    Deep Pressure LUE  -SG LUE  -SG    LUE Deep Pressure mild impairment  -SG mild impairment  -SG      12/02/17 1602 12/02/17 1505    Sensory Assessment/Intervention    Light Touch LUE  -SG LUE  -SG    LUE Light Touch severe impairment  -SG severe impairment  -SG    Sharp/Dull Discrimination LUE  -SG LUE  -SG    LUE Sharp/Dull Discrimination moderate impairment  -SG moderate impairment  -SG    Deep Pressure LUE  -SG LUE  -SG    LUE Deep Pressure mild impairment  -SG mild impairment  -SG      12/02/17 1403 12/02/17 1400    Rehab Evaluation    Document Type  evaluation  -CP    Subjective Information  no complaints  -CP    Cognitive Assessment/Intervention    Current Cognitive/Communication Assessment  --   L neglect  -CP    Sensory Assessment/Intervention    Light Touch LUE  -SG     LUE Light Touch severe impairment  -SG     Sharp/Dull Discrimination LUE  -SG     LUE Sharp/Dull Discrimination moderate impairment  -SG     Deep Pressure LUE  -SG     LUE Deep Pressure mild impairment  -SG        12/02/17 1300 12/02/17 1206    Sensory Assessment/Intervention    Light Touch LUE  -SG LUE  -SG    LUE Light Touch severe impairment  -SG severe impairment  -SG    Sharp/Dull Discrimination LUE  -SG LUE  -SG    LUE Sharp/Dull Discrimination moderate impairment  -SG moderate impairment  -SG    Deep Pressure LUE  -SG LUE  -SG    LUE Deep Pressure mild impairment  -SG mild impairment  -SG      12/02/17 1111 12/02/17 1100    Rehab Evaluation    Evaluation Not Performed unable to evaluate, medical status change;other (see comments)   Noted CT showed hemorrhagic transformation post-tPA. Holding swallow eval for now unless cleared per neruo.  -CP     Sensory Assessment/Intervention    Light Touch  LUE  -SG    LUE Light Touch  severe impairment  -SG    Sharp/Dull Discrimination  LUE  -SG    LUE Sharp/Dull Discrimination  moderate impairment  -SG    Deep Pressure  LUE  -SG    LUE Deep Pressure  mild impairment  -      12/02/17 1040 12/02/17 1000    Rehab Evaluation    Evaluation Not Performed other (see comments)   Per BEV Loco patient is not yet 24 hours post TPA, will check back tomorrow  -     Sensory Assessment/Intervention    Light Touch  LUE  -SG    LUE Light Touch  severe impairment  -SG    Sharp/Dull Discrimination  LUE  -SG    LUE Sharp/Dull Discrimination  moderate impairment  -SG    Deep Pressure  LUE  -SG    LUE Deep Pressure  mild impairment  -      12/02/17 0955 12/02/17 0900    Sensory Assessment/Intervention    Light Touch LUE  -SG LUE  -SG    LUE Light Touch severe impairment  -SG severe impairment  -SG    Sharp/Dull Discrimination LUE  -SG LUE  -SG    LUE Sharp/Dull Discrimination moderate impairment  -SG moderate impairment  -SG    Deep Pressure LUE  -SG LUE  -SG    LUE Deep Pressure mild impairment  -SG mild impairment  -      12/02/17 0805 12/02/17 0800    Rehab Evaluation    Evaluation Not Performed other (see comments)   pt s/p TPA yesterday late afternoon. pt not 24 hrs post TPA. Check back  Monday. 0805 am  -     Sensory Assessment/Intervention    Light Touch  LUE  -SG    LUE Light Touch  severe impairment  -SG    Sharp/Dull Discrimination  LUE  -SG    LUE Sharp/Dull Discrimination  moderate impairment  -SG    Deep Pressure  LUE  -SG    LUE Deep Pressure  mild impairment  -SG      12/01/17 1834       General Information    Equipment Currently Used at Home none  -AB       User Key  (r) = Recorded By, (t) = Taken By, (c) = Cosigned By    Initials Name Provider Type     Brittanie Franco, OTR Occupational Therapist    MALIKA Park, MS CCC-SLP Speech and Language Pathologist    PC Haily Champion, PT Physical Therapist    EVELINE Ntaaliia Maya, RN Registered Nurse    SG Beronica Nieves, RN Registered Nurse    WILBER Kessler, RN Registered Nurse    DANIEL Martinez, PT Physical Therapist    AB Selene Graf, RN Registered Nurse          Physical Therapy Education     Title: PT OT SLP Therapies (Active)     Topic: Physical Therapy (Active)     Point: Mobility training (Active)    Learning Progress Summary    Learner Readiness Method Response Comment Documented by Status   Patient Acceptance E,D NR   12/03/17 1127 Active               Point: Home exercise program (Active)    Learning Progress Summary    Learner Readiness Method Response Comment Documented by Status   Patient Acceptance E,D NR   12/03/17 1127 Active               Point: Body mechanics (Active)    Learning Progress Summary    Learner Readiness Method Response Comment Documented by Status   Patient Acceptance E,D NR   12/03/17 1127 Active               Point: Precautions (Active)    Learning Progress Summary    Learner Readiness Method Response Comment Documented by Status   Patient Acceptance E,D NR   12/03/17 1127 Active                      User Key     Initials Effective Dates Name Provider Type [facility] 12/01/15 -  Haily Champion, PT Physical Therapist PT                PT Recommendation and Plan  Anticipated Discharge  Disposition: inpatient rehabilitation facility  Planned Therapy Interventions: bed mobility training, balance training, gait training, home exercise program, strengthening, transfer training  PT Frequency: daily  Plan of Care Review  Plan Of Care Reviewed With: patient  Outcome Summary/Follow up Plan: pt presents with L neglect, L hemiparesis, impaired motor plannign and coordination L side, she will benefit from PT to address, erc inpatient rehab at d/c          IP PT Goals       12/03/17 1128          Bed Mobility PT LTG    Bed Mobility PT LTG, Date Established 12/03/17  -PC      Bed Mobility PT LTG, Time to Achieve 1 wk  -PC      Bed Mobility PT LTG, Activity Type supine to sit/sit to supine  -PC      Bed Mobility PT LTG, Trinity Level supervision required  -PC      Transfer Training PT LTG    Transfer Training PT LTG, Date Established 12/03/17  -PC      Transfer Training PT LTG, Time to Achieve 1 wk  -PC      Transfer Training PT LTG, Activity Type sit to stand/stand to sit  -PC      Transfer Training PT LTG, Trinity Level contact guard assist  -PC      Gait Training PT LTG    Gait Training Goal PT LTG, Date Established 12/03/17  -PC      Gait Training Goal PT LTG, Time to Achieve 1 wk  -PC      Gait Training Goal PT LTG, Trinity Level contact guard assist  -PC      Gait Training Goal PT LTG, Distance to Achieve 150 ft with appropriate assistive device  -PC        User Key  (r) = Recorded By, (t) = Taken By, (c) = Cosigned By    Initials Name Provider Type    PC Haily Champion, PT Physical Therapist                Outcome Measures       12/03/17 1100          How much help from another person do you currently need...    Turning from your back to your side while in flat bed without using bedrails? 3  -PC      Moving from lying on back to sitting on the side of a flat bed without bedrails? 3  -PC      Moving to and from a bed to a chair (including a wheelchair)? 3  -PC      Standing up from a chair  using your arms (e.g., wheelchair, bedside chair)? 3  -PC      Climbing 3-5 steps with a railing? 2  -PC      To walk in hospital room? 2  -PC      AM-PAC 6 Clicks Score 16  -PC      Functional Assessment    Outcome Measure Options AM-PAC 6 Clicks Basic Mobility (PT)  -PC        User Key  (r) = Recorded By, (t) = Taken By, (c) = Cosigned By    Initials Name Provider Type    PC Haily Champion, PT Physical Therapist           Time Calculation:         PT Charges       12/03/17 1130 12/03/17 1025       Time Calculation    Start Time 1049  -PC      Stop Time 1110  -PC      Time Calculation (min) 21 min  -PC      PT Received On 12/03/17  -PC      PT - Next Appointment 12/04/17  - 12/04/17  -     PT Goal Re-Cert Due Date 12/10/17  -PC        User Key  (r) = Recorded By, (t) = Taken By, (c) = Cosigned By    Initials Name Provider Type     Haily Champion, PT Physical Therapist    DANIEL Martinez PT Physical Therapist          Therapy Charges for Today     Code Description Service Date Service Provider Modifiers Qty    94023617890 HC PT EVAL MOD COMPLEXITY 2 12/3/2017 Haily Champion, PT GP 1    01269994037 HC PT THER PROC EA 15 MIN 12/3/2017 Haily Champion, PT GP 1    66724848858 HC PT THER SUPP EA 15 MIN 12/3/2017 Haily Champion, PT GP 1          PT G-Codes  Outcome Measure Options: AM-PAC 6 Clicks Basic Mobility (PT)      Haily Champion PT  12/3/2017

## 2017-12-03 NOTE — SIGNIFICANT NOTE
12/03/17 1025   Rehab Treatment   Discipline physical therapist   Rehab Evaluation   Evaluation Not Performed patient unavailable for evaluation  (Patient off floor for MRI- will check back tomorrow)   Recommendation   PT - Next Appointment 12/04/17

## 2017-12-03 NOTE — PROGRESS NOTES
Dr. JUANA Mathews    [facility] INTENSIVE [facility]ARE    12/3/2017    Patient ID:  Name:  Angelita Brambila  MRN:  [id number]  1965  52 y.o.  female            CC/Reason for visit: Follow-up for acute stroke    HPI: The patient is feeling a little bit better.  She still has weakness on the left side.    Vitals:  Vitals:    12/03/17 0305 12/03/17 0505 12/03/17 0700 12/03/17 1100   BP: 105/69 122/81     BP Location:       Patient Position:       Pulse: 81 75     Resp:       Temp:   98.3 °F (36.8 °C) 98.2 °F (36.8 °C)   TempSrc:   Oral Oral   SpO2: (!) 89% (!) 89%     Weight:       Height:               Body mass index is 24.3 kg/(m^2).    Intake/Output Summary (Last 24 hours) at 12/03/17 1315  Last data filed at 12/03/17 1100   Gross per 24 hour   Intake              908 ml   Output              950 ml   Net              -42 ml       Exam:  GEN:  Awake, alert, following all commands.  She does have weakness in the left upper and left lower extremity which is somewhat better today than yesterday.  LUNGS: Clear breath sounds bilat, nonlabored breathing  CV:  Normal S1S2, without murmur, no edema  ABD:  Non tender, no enlarged liver or masses  EXT:  No cyanosis or clubbing    Scheduled meds:    atorvastatin 80 mg Oral Nightly   famotidine 20 mg Intravenous Q12H   rOPINIRole 1 mg Oral BID     IV meds:                        sodium chloride 75 mL/hr Last Rate: 75 mL/hr (12/02/17 1711)       Data Review:   I reviewed the patient's medications and new clinical results.  Lab Results   Component Value Date    CALCIUM 7.9 (L) 12/03/2017    MG 3.2 (H) 12/03/2017    MG 1.6 12/02/2017       Results from last 7 days  Lab Units 12/03/17  0521 12/02/17  1651 12/01/17  1537   SODIUM mmol/L 137  --  142   POTASSIUM mmol/L 3.9 3.2* 2.9*   CHLORIDE mmol/L 100  --  98   CO2 mmol/L 23.8  --  30.0*   BUN mg/dL 6  --  5*   CREATININE mg/dL 0.62  --  0.75   CALCIUM mg/dL 7.9*  --  8.9   BILIRUBIN mg/dL  --   --  0.8   ALK PHOS U/L  --    --  88   ALT (SGPT) U/L  --   --  24   AST (SGOT) U/L  --   --  26   GLUCOSE mg/dL 105*  --  106*   WBC 10*3/mm3  --   --  9.02   HEMOGLOBIN g/dL  --   --  16.4*   PLATELETS 10*3/mm3  --   --  300   INR   --   --  0.96               ASSESSMENT:   Right middle cerebral artery  CVA (cerebral vascular accident)  Hemorrhagic transformation of CVA  Left hemiparesis  Hypertension  Hypokalemia      PLAN:  Continue aspirin and statin.  Appreciate input from neurology.    Continue rehabilitation efforts.  Transfer out of ICU today        Fadi Mathews MD  12/3/2017

## 2017-12-03 NOTE — PLAN OF CARE
Problem: Stroke (Ischemic) (Adult)  Goal: Signs and Symptoms of Listed Potential Problems Will be Absent or Manageable (Stroke)  Outcome: Ongoing (interventions implemented as appropriate)    Problem: Patient Care Overview (Adult)  Goal: Plan of Care Review  Outcome: Ongoing (interventions implemented as appropriate)    12/02/17 1957   Coping/Psychosocial Response Interventions   Plan Of Care Reviewed With patient;daughter;family;friend   Patient Care Overview   Progress improving   Outcome Evaluation   Outcome Summary/Follow up Plan tolerated mech soft/nectar thick diet; complains of headache and need for frequent pain medication; K/mag protocol initiated; discussed stroke rehabilition with family and friend at bedside; both daughter and friend are local and hopeful patient can stay in [place] for inpatient rehab; also discussed smoking cessation with patient, BRETT

## 2017-12-03 NOTE — NURSING NOTE
Discussion with daughter regarding rehab program, evaluation process and precert.  Will follow and proceed with eval.

## 2017-12-03 NOTE — PROGRESS NOTES
"Progress Note      PATIENT Angelita Brambila    1965   MRN [id number]   ADMIT DATE 2017   LENGTH OF STAY 2 days   ATTENDING Aly Drew MD       CHIEF COMPLAINT: Neuro Deficit(s)      DIAGNOSIS: Hypokalemia [E87.6]  CVA (cerebral vascular accident) [I63.9]  Cerebrovascular accident (CVA), unspecified mechanism [I63.9]    HISTORY OF PRESENT ILLNESS: The patient complains of headache on the right side of her head.   He has not noticed any change in neurologic status in particular over left-sided weakness and neither has a daughter over the last 24 hours.  He was up today walking with physical therapy apparently she needed a fair amount of assistance was able walk.  The patient lives by herself about 45 minutes for many.  Her   and She Lives There by Herself so the Daughter Is Concerned about That.  She does smoke cigarettes.    PHYSICAL EXAMINATION:  GENERAL: Reveals a man/woman who appears his/her stated age, in no acute distress.  VITAL SIGNS: /81  Pulse 75  Temp 98.3 °F (36.8 °C) (Oral)   Resp 17  Ht 65\" (165.1 cm)  Wt 146 lb (66.2 kg)  LMP Comment: hysterectomy   SpO2 (!) 89%  BMI 24.3 kg/m2  NECK: Carotids are equal without bruits.   HEART: Regular rate and rhythm with no significant murmur or gallop.   EXTREMITIES: Nonedematous. Pulses are intact. There is no rash. There is no hepatosplenomegaly.   NEUROLOGIC: He is awake and alert. He is oriented x3. There is no aphasia.  There is no significant dysarthria.  She has a left homonymous hemianopsia.  She can do find gravity with the left arm and left leg.  Left arm is spastic which is surprising.  She's never had a previous infarct.  She demonstrates weakness in the left distal greater than proximal upper extremity with a proximal about 4 and the distal 3plus.  He still has a significant sensory deficit in the left arm in particular.  Was also clumsy.  Hours normal in the left leg except for some mild weakness of hip " flexion but the dorsiflexors of the left foot are normal.    I reviewed the MRI images of the brain done today and shows the same infarct in the right MCA distribution.  There is no infarcts in any other distribution.  There is blood scattered throughout the infarct but I am not convinced that there is a more blood than there was yesterday.  I think we have demonstrated is a gradient echo imaging is more sensitive than CT at detecting blood.  There is mild mass effect but no shift.  DIAGNOSTIC DATA: MRI was performed as described above.    IMPRESSION AND PLAN: Clinically the patient is doing reasonably well.  Her deficit is actually less severe than I would anticipate by looking at her imaging.  Somewhat unusual to get spasticity this earlier but occasionally happens.  Her echocardiogram was unremarkable and given the  carotid stenosis on the left I would assume that that was the source of artery to artery embolism leading to the present symptoms.  The radiologist didn't feel they could definitively separate atherosclerotic lesion from a thrombus.  For practical perspective it doesn't really manner.  I'm not going to anticoagulate given this hemorrhagic infarct and there is really no firm indication for anticoagulation anyway.    I feel we should treat her with a statin which we are doing.  Would hold on any antiplatelets for a few weeks and then re-CT and decide.  I don't feel there is an indication for a carotid endarterectomy at this point.  However if this ends up being a nondisabling stroke it would be reasonable to repeat a CTA in a month or so.    No other workup is required at this point.  I feel she could be transferred out of the ICU.  (45 min with 30 min spent counselling family and coordinating care with luigi)        Timothy Bunch MD  12/3/2017  11:41 AM

## 2017-12-03 NOTE — PLAN OF CARE
Problem: Patient Care Overview (Adult)  Goal: Plan of Care Review  Outcome: Ongoing (interventions implemented as appropriate)    12/03/17 1128   Coping/Psychosocial Response Interventions   Plan Of Care Reviewed With patient   Outcome Evaluation   Outcome Summary/Follow up Plan pt presents with L neglect, L hemiparesis, impaired motor plannign and coordination L side, she will benefit from PT to address, erc inpatient rehab at d/c         Problem: Inpatient Physical Therapy  Goal: Bed Mobility Goal LTG- PT  Outcome: Ongoing (interventions implemented as appropriate)    12/03/17 1128   Bed Mobility PT LTG   Bed Mobility PT LTG, Date Established 12/03/17   Bed Mobility PT LTG, Time to Achieve 1 wk   Bed Mobility PT LTG, Activity Type supine to sit/sit to supine   Bed Mobility PT LTG, [place] Level supervision required       Goal: Transfer Training Goal 1 LTG- PT  Outcome: Ongoing (interventions implemented as appropriate)    12/03/17 1128   Transfer Training PT LTG   Transfer Training PT LTG, Date Established 12/03/17   Transfer Training PT LTG, Time to Achieve 1 wk   Transfer Training PT LTG, Activity Type sit to stand/stand to sit   Transfer Training PT LTG, [place] Level contact guard assist       Goal: Gait Training Goal LTG- PT  Outcome: Ongoing (interventions implemented as appropriate)    12/03/17 1128   Gait Training PT LTG   Gait Training Goal PT LTG, Date Established 12/03/17   Gait Training Goal PT LTG, Time to Achieve 1 wk   Gait Training Goal PT LTG, [place] Level contact guard assist   Gait Training Goal PT LTG, Distance to Achieve 150 ft with appropriate assistive device

## 2017-12-04 ENCOUNTER — APPOINTMENT (OUTPATIENT)
Dept: GENERAL RADIOLOGY | Facility: HOSPITAL | Age: 52
End: 2017-12-04
Attending: INTERNAL MEDICINE

## 2017-12-04 ENCOUNTER — APPOINTMENT (OUTPATIENT)
Dept: GENERAL RADIOLOGY | Facility: HOSPITAL | Age: 52
End: 2017-12-04

## 2017-12-04 DIAGNOSIS — I63.9 CEREBROVASCULAR ACCIDENT (CVA), UNSPECIFIED MECHANISM (HCC): Primary | ICD-10-CM

## 2017-12-04 LAB
ALBUMIN SERPL-MCNC: 3 G/DL (ref 3.5–5.2)
ALBUMIN/GLOB SERPL: 1.2 G/DL
ALP SERPL-CCNC: 70 U/L (ref 39–117)
ALT SERPL W P-5'-P-CCNC: 13 U/L (ref 1–33)
ANION GAP SERPL CALCULATED.3IONS-SCNC: 12.6 MMOL/L
AST SERPL-CCNC: 19 U/L (ref 1–32)
BASOPHILS # BLD AUTO: 0.01 10*3/MM3 (ref 0–0.2)
BASOPHILS NFR BLD AUTO: 0.1 % (ref 0–1.5)
BILIRUB SERPL-MCNC: 0.6 MG/DL (ref 0.1–1.2)
BUN BLD-MCNC: 6 MG/DL (ref 6–20)
BUN/CREAT SERPL: 10.5 (ref 7–25)
CALCIUM SPEC-SCNC: 7.9 MG/DL (ref 8.6–10.5)
CHLORIDE SERPL-SCNC: 103 MMOL/L (ref 98–107)
CO2 SERPL-SCNC: 22.4 MMOL/L (ref 22–29)
CREAT BLD-MCNC: 0.57 MG/DL (ref 0.57–1)
DEPRECATED RDW RBC AUTO: 61.2 FL (ref 37–54)
EOSINOPHIL # BLD AUTO: 0.08 10*3/MM3 (ref 0–0.7)
EOSINOPHIL NFR BLD AUTO: 1.2 % (ref 0.3–6.2)
ERYTHROCYTE [DISTWIDTH] IN BLOOD BY AUTOMATED COUNT: 15.1 % (ref 11.7–13)
GFR SERPL CREATININE-BSD FRML MDRD: 111 ML/MIN/1.73
GLOBULIN UR ELPH-MCNC: 2.6 GM/DL
GLUCOSE BLD-MCNC: 93 MG/DL (ref 65–99)
HCT VFR BLD AUTO: 37 % (ref 35.6–45.5)
HGB BLD-MCNC: 12.3 G/DL (ref 11.9–15.5)
IMM GRANULOCYTES # BLD: 0.02 10*3/MM3 (ref 0–0.03)
IMM GRANULOCYTES NFR BLD: 0.3 % (ref 0–0.5)
INR PPP: 1.04 (ref 0.9–1.1)
LYMPHOCYTES # BLD AUTO: 2.04 10*3/MM3 (ref 0.9–4.8)
LYMPHOCYTES NFR BLD AUTO: 29.5 % (ref 19.6–45.3)
MAGNESIUM SERPL-MCNC: 2.4 MG/DL (ref 1.6–2.6)
MCH RBC QN AUTO: 37 PG (ref 26.9–32)
MCHC RBC AUTO-ENTMCNC: 33.2 G/DL (ref 32.4–36.3)
MCV RBC AUTO: 111.4 FL (ref 80.5–98.2)
MONOCYTES # BLD AUTO: 0.59 10*3/MM3 (ref 0.2–1.2)
MONOCYTES NFR BLD AUTO: 8.5 % (ref 5–12)
NEUTROPHILS # BLD AUTO: 4.17 10*3/MM3 (ref 1.9–8.1)
NEUTROPHILS NFR BLD AUTO: 60.4 % (ref 42.7–76)
NT-PROBNP SERPL-MCNC: 313.8 PG/ML (ref 0–900)
PHOSPHATE SERPL-MCNC: 2.9 MG/DL (ref 2.5–4.5)
PLATELET # BLD AUTO: 259 10*3/MM3 (ref 140–500)
PMV BLD AUTO: 10 FL (ref 6–12)
POTASSIUM BLD-SCNC: 3.5 MMOL/L (ref 3.5–5.2)
PROT SERPL-MCNC: 5.6 G/DL (ref 6–8.5)
PROTHROMBIN TIME: 13.2 SECONDS (ref 11.7–14.2)
RBC # BLD AUTO: 3.32 10*6/MM3 (ref 3.9–5.2)
SODIUM BLD-SCNC: 138 MMOL/L (ref 136–145)
WBC NRBC COR # BLD: 6.91 10*3/MM3 (ref 4.5–10.7)

## 2017-12-04 PROCEDURE — 99233 SBSQ HOSP IP/OBS HIGH 50: CPT | Performed by: NURSE PRACTITIONER

## 2017-12-04 PROCEDURE — 83735 ASSAY OF MAGNESIUM: CPT | Performed by: INTERNAL MEDICINE

## 2017-12-04 PROCEDURE — 80053 COMPREHEN METABOLIC PANEL: CPT | Performed by: INTERNAL MEDICINE

## 2017-12-04 PROCEDURE — 25010000002 ONDANSETRON PER 1 MG: Performed by: INTERNAL MEDICINE

## 2017-12-04 PROCEDURE — 83880 ASSAY OF NATRIURETIC PEPTIDE: CPT | Performed by: INTERNAL MEDICINE

## 2017-12-04 PROCEDURE — 74230 X-RAY XM SWLNG FUNCJ C+: CPT

## 2017-12-04 PROCEDURE — 85610 PROTHROMBIN TIME: CPT | Performed by: INTERNAL MEDICINE

## 2017-12-04 PROCEDURE — 84100 ASSAY OF PHOSPHORUS: CPT | Performed by: INTERNAL MEDICINE

## 2017-12-04 PROCEDURE — 85025 COMPLETE CBC W/AUTO DIFF WBC: CPT | Performed by: INTERNAL MEDICINE

## 2017-12-04 PROCEDURE — 97110 THERAPEUTIC EXERCISES: CPT

## 2017-12-04 PROCEDURE — 73110 X-RAY EXAM OF WRIST: CPT

## 2017-12-04 PROCEDURE — 92611 MOTION FLUOROSCOPY/SWALLOW: CPT

## 2017-12-04 RX ORDER — FAMOTIDINE 20 MG/1
20 TABLET, FILM COATED ORAL 2 TIMES DAILY
Status: DISCONTINUED | OUTPATIENT
Start: 2017-12-04 | End: 2017-12-07

## 2017-12-04 RX ORDER — CALCIUM CARBONATE 200(500)MG
1 TABLET,CHEWABLE ORAL 3 TIMES DAILY PRN
Status: DISCONTINUED | OUTPATIENT
Start: 2017-12-04 | End: 2017-12-13 | Stop reason: HOSPADM

## 2017-12-04 RX ORDER — BUTALBITAL, ACETAMINOPHEN AND CAFFEINE 50; 325; 40 MG/1; MG/1; MG/1
2 TABLET ORAL EVERY 6 HOURS PRN
Status: DISCONTINUED | OUTPATIENT
Start: 2017-12-04 | End: 2017-12-13 | Stop reason: HOSPADM

## 2017-12-04 RX ADMIN — ROPINIROLE 1 MG: 1 TABLET, FILM COATED ORAL at 17:26

## 2017-12-04 RX ADMIN — FAMOTIDINE 20 MG: 20 TABLET, FILM COATED ORAL at 11:10

## 2017-12-04 RX ADMIN — HYDROCODONE BITARTRATE AND ACETAMINOPHEN 1 TABLET: 5; 325 TABLET ORAL at 06:17

## 2017-12-04 RX ADMIN — FAMOTIDINE 20 MG: 20 TABLET, FILM COATED ORAL at 17:26

## 2017-12-04 RX ADMIN — BUTALBITAL, ACETAMINOPHEN, AND CAFFEINE 2 TABLET: 50; 325; 40 TABLET ORAL at 23:36

## 2017-12-04 RX ADMIN — HYDROCODONE BITARTRATE AND ACETAMINOPHEN 1 TABLET: 5; 325 TABLET ORAL at 12:06

## 2017-12-04 RX ADMIN — ROPINIROLE 1 MG: 1 TABLET, FILM COATED ORAL at 21:01

## 2017-12-04 RX ADMIN — ROPINIROLE 1 MG: 1 TABLET, FILM COATED ORAL at 10:14

## 2017-12-04 RX ADMIN — AMLODIPINE BESYLATE 5 MG: 5 TABLET ORAL at 10:14

## 2017-12-04 RX ADMIN — ONDANSETRON 4 MG: 2 INJECTION INTRAMUSCULAR; INTRAVENOUS at 21:01

## 2017-12-04 RX ADMIN — ATORVASTATIN CALCIUM 80 MG: 80 TABLET, FILM COATED ORAL at 21:01

## 2017-12-04 RX ADMIN — ACETAMINOPHEN 650 MG: 325 TABLET ORAL at 00:10

## 2017-12-04 RX ADMIN — BUTALBITAL, ACETAMINOPHEN, AND CAFFEINE 2 TABLET: 50; 325; 40 TABLET ORAL at 16:59

## 2017-12-04 RX ADMIN — HYDROCODONE BITARTRATE AND ACETAMINOPHEN 1 TABLET: 5; 325 TABLET ORAL at 21:01

## 2017-12-04 RX ADMIN — SODIUM CHLORIDE 75 ML/HR: 9 INJECTION, SOLUTION INTRAVENOUS at 06:18

## 2017-12-04 NOTE — PLAN OF CARE
Problem: Patient Care Overview (Adult)  Goal: Plan of Care Review    12/04/17 1500   Coping/Psychosocial Response Interventions   Plan Of Care Reviewed With patient   Patient Care Overview   Progress improving   Outcome Evaluation   Outcome Summary/Follow up Plan Pt demonstrating improved strength and balance as evidenced by tolerance of increased gait distance as well as increased independence with transfers and gait today. Continues to require close assist of one and verbal cues for safety due to L neglect. Suspected R wrist fx; not splinted; did not hold pt's R hand to avoid weight bearing at wrist.

## 2017-12-04 NOTE — PLAN OF CARE
Problem: Fall Risk (Adult)  Goal: Absence of Falls  Outcome: Ongoing (interventions implemented as appropriate)    Problem: Patient Care Overview (Adult)  Goal: Plan of Care Review  Outcome: Ongoing (interventions implemented as appropriate)    12/04/17 1642   Coping/Psychosocial Response Interventions   Plan Of Care Reviewed With patient;son   Patient Care Overview   Progress improving   Outcome Evaluation   Outcome Summary/Follow up Plan Ambulated in oscar with therapy staff today. Tolerated thin liquids and diet. F/U xrays to right wrist to r/o additional fx. HA still present. N.O. rec'd for Fiorcet          Problem: Pressure Ulcer Risk (Maury Scale) (Adult,Obstetrics,Pediatric)  Goal: Skin Integrity  Outcome: Ongoing (interventions implemented as appropriate)    12/04/17 1642   Pressure Ulcer Risk (Maury Scale) (Adult,Obstetrics,Pediatric)   Skin Integrity making progress toward outcome

## 2017-12-04 NOTE — THERAPY TREATMENT NOTE
"Acute Care - Physical Therapy Treatment Note  [facility]     Patient Name: Angelita Brambila  : 1965  MRN: [id number]  Today's Date: 2017  Onset of Illness/Injury or Date of Surgery Date: 17  Date of Referral to PT: 17  Referring Physician: Rajendra    Admit Date: 2017    Visit Dx:    ICD-10-CM ICD-9-CM   1. Cerebrovascular accident (CVA), unspecified mechanism I63.9 434.91   2. Hypokalemia E87.6 276.8   3. Hemiparesis affecting left side as late effect of cerebrovascular accident I69.354 438.20     Patient Active Problem List   Diagnosis   • Complete tear of right rotator cuff   • Precordial pain   • Dyspnea on exertion   • Palpitations   • Essential hypertension   • Hyperlipidemia   • Tobacco abuse   • CVA (cerebral vascular accident)               Adult Rehabilitation Note       17 1437          Rehab Assessment/Intervention    Discipline physical therapist  -EE      Document Type therapy note (daily note)  -EE      Subjective Information agree to therapy;complains of;pain  -EE      Patient Effort, Rehab Treatment good  -EE      Symptoms Noted During/After Treatment increased pain;other (see comments)   headache  -EE      Symptoms Noted Comment Pt reported \"I think my headache is coming back\" following activity. Jyothi PABLO, at bedside and aware.  -EE      Precautions/Limitations fall precautions;other (see comments)   possible R wrist fx; has not been splinted yet  -EE      Specific Treatment Considerations Potential R wrist fx that has not been splinted; assisted pt by holding R elbow/forearm and maintained NWB on R hand/wrist as a precaution  -EE      Recorded by [EE] Saritha Way, PT      Pain Assessment    Pain Assessment 0-10  -EE      Pain Score 5  -EE      Pain Type Acute pain  -EE      Pain Location Head   also R wrist soreness from suspected fx  -EE      Pain Descriptors Aching;Sore  -EE      Pain Intervention(s) Repositioned;Rest  -EE      Response to Interventions " tolerated  -EE      Recorded by [EE] Saritha Way, PT      Vision Assessment/Intervention    Visual Impairment inattention to the left;left neglect;needs cues to attend visually  -EE      Visual Impairment Comment Demonstrates preference for R gaze, able to turn head to midline and to L with cueing  -EE      Recorded by [EE] Saritha Way PT      Cognitive Assessment/Intervention    Current Cognitive/Communication Assessment functional  -EE      Orientation Status oriented x 4  -EE      Follows Commands/Answers Questions 100% of the time  -EE      Personal Safety mild impairment;decreased insight to deficits  -EE      Personal Safety Interventions fall prevention program maintained;gait belt;muscle strengthening facilitated;nonskid shoes/slippers when out of bed;supervised activity  -EE      Recorded by [EE] Saritha Way PT      Bed Mobility, Assessment/Treatment    Bed Mobility, Assistive Device bed rails;head of bed elevated  -EE      Bed Mob, Supine to Sit, [place] contact guard assist;verbal cues required  -EE      Bed Mob, Sit to Supine, [place] supervision required  -EE      Bed Mobility, Impairments strength decreased;pain;motor control impaired;coordination impaired  -EE      Recorded by [EE] Saritha Way PT      Transfer Assessment/Treatment    Transfers, Sit-Stand [place] contact guard assist;verbal cues required;nonverbal cues required (demo/gesture);hand held assist  -EE      Transfers, Stand-Sit [place] contact guard assist;verbal cues required;hand held assist  -EE      Transfer, Safety Issues step length decreased;balance decreased during turns  -EE      Transfer, Impairments strength decreased;impaired balance;coordination impaired;motor control impaired;pain  -EE      Recorded by [EE] Saritha Way, PT      Gait Assessment/Treatment    Gait, [place] Level contact guard assist;minimum assist (75% patient effort);verbal cues required;nonverbal cues required (demo/gesture);hand  held assist  -EE      Gait, Distance (Feet) 400  -EE      Gait, Gait Deviations ronnell decreased;leans to the left;ataxia;narrow base;weight-shifting ability decreased;step length decreased  -EE      Gait, Safety Issues step length decreased;weight-shifting ability decreased;balance decreased during turns  -EE      Gait, Impairments strength decreased;impaired balance;coordination impaired;impaired vision;motor control impaired  -EE      Gait, Comment Pt assisted by holding R elbow; avoided weightbearing on R hand/wrist. Verbal cues to scan to L and avoid obstacles on L sided during ambulation.  -EE      Recorded by [EE] Saritha Way PT      Positioning and Restraints    Pre-Treatment Position in bed  -EE      Post Treatment Position bed  -EE      In Bed fowlers;call light within reach;encouraged to call for assist;exit alarm on;with family/caregiver;notified nsg;SCD pump applied;LUE elevated  -EE      Recorded by [EE] Saritha Way PT        User Key  (r) = Recorded By, (t) = Taken By, (c) = Cosigned By    Initials Name Effective Dates    EE Saritha Way, PT 12/01/15 -                 IP PT Goals       12/03/17 1128          Bed Mobility PT LTG    Bed Mobility PT LTG, Date Established 12/03/17  -PC      Bed Mobility PT LTG, Time to Achieve 1 wk  -PC      Bed Mobility PT LTG, Activity Type supine to sit/sit to supine  -PC      Bed Mobility PT LTG, [place] Level supervision required  -PC      Transfer Training PT LTG    Transfer Training PT LTG, Date Established 12/03/17  -PC      Transfer Training PT LTG, Time to Achieve 1 wk  -PC      Transfer Training PT LTG, Activity Type sit to stand/stand to sit  -PC      Transfer Training PT LTG, [place] Level contact guard assist  -PC      Gait Training PT LTG    Gait Training Goal PT LTG, Date Established 12/03/17  -PC      Gait Training Goal PT LTG, Time to Achieve 1 wk  -PC      Gait Training Goal PT LTG, [place] Level contact guard assist  -PC      Gait  Training Goal PT LTG, Distance to Achieve 150 ft with appropriate assistive device  -PC        User Key  (r) = Recorded By, (t) = Taken By, (c) = Cosigned By    Initials Name Provider Type    PC Haily Champion, PT Physical Therapist          Physical Therapy Education     Title: PT OT SLP Therapies (Active)     Topic: Physical Therapy (Active)     Point: Mobility training (Active)    Learning Progress Summary    Learner Readiness Method Response Comment Documented by Status   Patient Acceptance E,TB NR  EE 12/04/17 1500 Active    Acceptance E,D NR  PC 12/03/17 1127 Active               Point: Home exercise program (Active)    Learning Progress Summary    Learner Readiness Method Response Comment Documented by Status   Patient Acceptance E,D NR  PC 12/03/17 1127 Active               Point: Body mechanics (Active)    Learning Progress Summary    Learner Readiness Method Response Comment Documented by Status   Patient Acceptance E,TB NR  EE 12/04/17 1500 Active    Acceptance E,D NR  PC 12/03/17 1127 Active               Point: Precautions (Active)    Learning Progress Summary    Learner Readiness Method Response Comment Documented by Status   Patient Acceptance E,TB NR  EE 12/04/17 1500 Active    Acceptance E,D NR  PC 12/03/17 1127 Active                      User Key     Initials Effective Dates Name Provider Type Discipline    PC 12/01/15 -  Haily Champion, PT Physical Therapist PT    EE 12/01/15 -  Saritha Way, PT Physical Therapist PT                    PT Recommendation and Plan  Anticipated Discharge Disposition: inpatient rehabilitation facility  Planned Therapy Interventions: bed mobility training, balance training, gait training, home exercise program, strengthening, transfer training  PT Frequency: daily  Plan of Care Review  Plan Of Care Reviewed With: patient  Progress: improving  Outcome Summary/Follow up Plan: Pt demonstrating improved strength and balance as evidenced by tolerance of increased gait  distance as well as increased independence with transfers and gait today. Continues to require close assist of one and verbal cues for safety due to L neglect. Suspected R wrist fx; not splinted; did not hold pt's R hand to avoid weight bearing at wrist.           Outcome Measures       12/04/17 1500 12/03/17 1100       How much help from another person do you currently need...    Turning from your back to your side while in flat bed without using bedrails? 3  -EE 3  -PC     Moving from lying on back to sitting on the side of a flat bed without bedrails? 3  -EE 3  -PC     Moving to and from a bed to a chair (including a wheelchair)? 3  -EE 3  -PC     Standing up from a chair using your arms (e.g., wheelchair, bedside chair)? 3  -EE 3  -PC     Climbing 3-5 steps with a railing? 2  -EE 2  -PC     To walk in hospital room? 3  -EE 2  -PC     AM-PAC 6 Clicks Score 17  -EE 16  -PC     Functional Assessment    Outcome Measure Options AM-PAC 6 Clicks Basic Mobility (PT)  -EE AM-PAC 6 Clicks Basic Mobility (PT)  -PC       User Key  (r) = Recorded By, (t) = Taken By, (c) = Cosigned By    Initials Name Provider Type    PC Haily Champion, PT Physical Therapist    EE Saritha Way, PT Physical Therapist           Time Calculation:         PT Charges       12/04/17 1502 12/04/17 0826       Time Calculation    Start Time 1436  -EE      Stop Time 1452  -EE      Time Calculation (min) 16 min  -EE      PT Received On 12/04/17  -EE      PT - Next Appointment 12/05/17  -EE 12/04/17  -       User Key  (r) = Recorded By, (t) = Taken By, (c) = Cosigned By    Initials Name Provider Type    EE Saritha Way, PT Physical Therapist    RW Laura Arteaga, PTA Physical Therapy Assistant          Therapy Charges for Today     Code Description Service Date Service Provider Modifiers Qty    16686635460 HC PT THER PROC EA 15 MIN 12/4/2017 Saritha Way, PT GP 1          PT G-Codes  Outcome Measure Options: AM-PAC 6 Clicks Basic Mobility (PT)    Saritha  Seamus, PT  12/4/2017

## 2017-12-04 NOTE — MBS/VFSS/FEES
Acute Care - Speech Language Pathology   Swallow Initial Evaluation- VFSS [facility]     Patient Name: Angelita Brambila  : 1965  MRN: [id number]  Today's Date: 2017  Onset of Illness/Injury or Date of Surgery Date: 17            Admit Date: 2017  SPEECH-LANGUAGE PATHOLOGY EVALUTION - VFSS  Subjective: The patient was seen on this date for a VFSS(Videofluoroscopic Swallowing Study).  Patient was alert and cooperative.    Significant history: R MCA stroke.   Objective: Risks/benefits were reviewed with the patient, and consent was obtained. The study was completed with SLP present and Radiologist review. The patient was seen in lateral view(s). Textures given included thin liquid, nectar thick liquid, puree consistency, mechanical soft consistency and regular consistency.  Assessment: Difficulties were noted with thin liquid, mechanical soft consistency and regular consistency.  The patient demonstrated trace, silent posterior aspiration noted with thins via straw and mixed mech soft..   SLP Findings: Pt presents with moderate oropharyngeal dysphagia characterized by lingual weakness, lingual pumping during bolus propulsion, swallow delay, and sluggish hyolaryngeal elevation/excursion. No penetration with single drinks thins via cup. Shallow, transient penetration with consecutive drinks thins via cup. Trace, silent, posterior aspiration with thins via straw x1. Pt with mild residue post swallow at pyriforms with large drinks, cues to clear. No penetration with trials of nectar via cup or straw. Swallow initiaiton WFLs with puree. Mild base of lingual residue post swallow. Pt cleared without cues. Spill to pyriforms with mixed; trace, posterior aspiration without cough with thin portion of mixed. Prolonged bolus preparation/formation/propulsion with regular. Mild-moderate oral residue post swallow, pt required cues to clear. No pharyngeal residue post swallow. Liquid wash assisted with  swallow initiation. Of note, no penetration observed with large drinks thins as liquid wash.  Recommendations: Diet Textures: thin liquid, mechanical soft consistency with no mixed textures food. Medications should be taken whole with puree.   Recommended Strategies: check for pocketing on L, Upright for PO, small bites and sips and no straw. Oral care before breakfast, after all meals and PRN.  Other Recommended Evaluations: Speech-Language Evaluation and Cognitive-Linguistic Evaluation    Dysphagia therapy is not recommended. Rationale: to monitor diet tolerance and improve swallow function.        Visit Dx:     ICD-10-CM ICD-9-CM   1. Cerebrovascular accident (CVA), unspecified mechanism I63.9 434.91   2. Hypokalemia E87.6 276.8   3. Hemiparesis affecting left side as late effect of cerebrovascular accident I69.354 438.20     Patient Active Problem List   Diagnosis   • Complete tear of right rotator cuff   • Precordial pain   • Dyspnea on exertion   • Palpitations   • Essential hypertension   • Hyperlipidemia   • Tobacco abuse   • CVA (cerebral vascular accident)     Past Medical History:   Diagnosis Date   • Allergic rhinitis    • Ankle swelling    • Anxiety    • Arthritis    • Asthma    • Attention deficit hyperactivity disorder    • Back pain    • Chest pain    • CHF (congestive heart failure)    • COPD (chronic obstructive pulmonary disease)    • Coronary artery disease    • Depression    • Esophageal reflux    • High cholesterol     Reported cholesterol level was high   • History of colonoscopy     Complete colonoscopy   • Hypercholesterolemia    • Hypertension    • IBS (irritable bowel syndrome)    • Insomnia    • Osteopenia    • Palpitations    • Rotator cuff tendonitis    • Seborrheic dermatitis    • Stroke    • Tendinitis, de Quervain's    • Tobacco use    • Vitamin B12 deficiency    • Vitamin D deficiency      Past Surgical History:   Procedure Laterality Date   • COLONOSCOPY     • DIAGNOSTIC  LAPAROSCOPY EXPLORATORY LAPAROTOMY     • ELBOW ARTHROPLASTY Right     Elbow surgery   • FRACTURE SURGERY     • HYSTERECTOMY  1999    Including both ovaries   • TONSILLECTOMY     • WRIST SURGERY Left           SWALLOW EVALUATION (last 72 hours)      Swallow Evaluation       12/04/17 0800 12/02/17 1400             Rehab Evaluation    Document Type evaluation  - evaluation  -CP       Subjective Information agree to therapy  - no complaints  -CP       Patient Effort, Rehab Treatment good  -SH        General Information    Patient Profile Review yes  -SH yes  -CP       Current Diet Limitations nectar thick liquids;mechanical soft  -SH NPO  -CP       Prior Level of Function- Communication functional in all spheres  - functional in all spheres  -CP       Prior Level of Function- Swallowing no diet consistency restrictions  - no diet consistency restrictions  -CP       Plans/Goals Discussed With patient  - patient;family  -CP       Barriers to Rehab none identified  - none identified  -CP       Clinical Impression    Patient's Goals For Discharge return to regular diet  - return to regular diet  -CP       SLP Swallowing Diagnosis moderate dysphagia  - mild dysphagia  -CP       Rehab Potential/Prognosis, Swallowing good, to achieve stated therapy goals  - good, to achieve stated therapy goals  -CP       Criteria for Skilled Therapeutic Interventions Met skilled criteria for dysphagia intervention met  - skilled criteria for dysphagia intervention met  -CP       FCM, Swallowing 5-->Level 5  - 4-->Level 4  -CP       Therapy Frequency PRN  - PRN  -CP       Predicted Duration Therapy Interv (days) until discharge  - until discharge  -CP       Expected Duration Therapy Session (min) 15-30 minutes  - 15-30 minutes  -CP       SLP Diet Recommendation IV - mechanical soft, no mixed consistencies;thin liquids  - IV - mechanical soft, no mixed consistencies;nectar/syrup-thick liquids  -CP       Recommended  Diagnostics reassess via VFSS (MBS)  - VFSS (MBS)  -CP       Recommended Feeding/Eating Techniques no straws;small sips/bites;maintain upright posture during/after eating for 30 mins;check mouth frequently for oral residue/pocketing  - maintain upright posture during/after eating for 30 mins;small sips/bites  -CP       SLP Rec. for Method of Medication Administration meds whole in pudding/applesauce  - meds whole in pudding/applesauce;meds whole with thickened liquid  -CP       Monitor For Signs Of Aspiration cough;elevated WBC count;gurgly voice;throat clearing;fever;upper respiratory infection;right lower lobe infiltrates;pneumonia  - cough;gurgly voice;throat clearing  -CP       Anticipated Discharge Disposition inpatient rehabilitation facility  - other (see comments)   unknown  -CP       Pain Assessment    Pain Assessment No/denies pain  -        Cognitive Assessment/Intervention    Current Cognitive/Communication Assessment  --   L neglect  -CP       Oral Motor Structure and Function    Oral Motor Anatomy and Physiology  patient demonstrates anatomy that is WNL  -CP       Dentition Assessment  present and adequate  -CP       Secretion Management  WNL/WFL  -CP       Mucosal Quality  dry  -CP       Oral Musculature General Assessment  oral labial impairment  -CP       Oral Labial Strength and Mobility  left labial droop  -CP       SLP Communication to Radiology    Summary Statement Pt presents with moderate oropharyngeal dysphagia characterized by lingual weakness, lingual pumping during bolus propulsion, swallow delay, and sluggish hyolaryngeal elevation/excursion. No penetration with single drinks thins via cup. Shallow, transient penetration with consecutive drinks thins via cup. Trace, silent, posterior aspiration with thins via straw x1. Pt with mild residue post swallow at pyriforms with large drinks, cues to clear. No penetration with trials of nectar via cup or straw. Swallow initiaiton [facility]  with puree. Mild base of lingual residue post swallow. Pt cleared without cues. Spill to pyriforms with mixed; trace, posterior aspiration without cough with thin portion of mixed. Prolonged bolus preparation/formation/propulsion with regular. Mild-moderate oral residue post swallow, pt required cues to clear. No pharyngeal residue post swallow. Liquid wash assisted with swallow initiation. Of note, no penetration observed with large drinks thins as liquid wash.  -        Dysphagia Treatment Objectives and Progress    Dysphagia Treatment Objectives Improve oral skills;Improve hyolaryngeal excursion;Improve tongue base & pharyngeal wall squeeze  -        Improve oral skills    To Improve Oral Skills, patient will: Increase lip closure;Increase tongue tip elevation;Increase tongue lateralization;Increase tongue A-P movement;Increase back of tongue control;80%;with inconsistent cues  -SH        Improve hyolaryngeal excursion    To improve hyolaryngeal excursion, patient will: Complete head lift sustained (comment number of seconds);Complete head lift repetitive (comment number of lifts);80%;with inconsistent cues  -SH        Improve tongue base & pharyngeal wall squeeze    To improve tongue base & pharyngeal wall squeeze, patient will: Complete effortful swallow;Complete tongue hold swallow;Complete tongue base retraction;80%;with inconsistent cues  -SH          User Key  (r) = Recorded By, (t) = Taken By, (c) = Cosigned By    Initials Name Effective Dates    CP Mei Park MS CCC-SLP 04/13/15 -      Beronica Devi MS [facility]-SLP 07/13/17 -         EDUCATION  The patient has been educated in the following areas:   Modified Diet Instruction.    SLP Recommendation and Plan  SLP Swallowing Diagnosis: moderate dysphagia  SLP Diet Recommendation: IV - mechanical soft, no mixed consistencies, thin liquids  Recommended Feeding/Eating Techniques: no straws, small sips/bites, maintain upright posture during/after eating  for 30 mins, check mouth frequently for oral residue/pocketing  SLP Rec. for Method of Medication Administration: meds whole in pudding/applesauce  Monitor For Signs Of Aspiration: cough, elevated WBC count, gurgly voice, throat clearing, fever, upper respiratory infection, right lower lobe infiltrates, pneumonia  Recommended Diagnostics: reassess [place]  Criteria for Skilled Therapeutic Interventions Met: skilled criteria for dysphagia intervention met  Anticipated Discharge Disposition: inpatient rehabilitation facility  Rehab Potential/Prognosis, Swallowing: good, to achieve stated therapy goals  Therapy Frequency: PRN             Plan of Care Review  Plan Of Care Reviewed With: patient  Progress: improving  Outcome Summary/Follow up Plan: VFSS completed. Trace, silent, posterior aspiration with thins via straw and mixed mech soft. Mild-moderate oral residue with regular. SLP recs upgrade to thins (single drinks, no straws) and to continue no mixed mech soft. Will continue to follow for dysphagia tx, cog eval, and motor speech eval as indicated.           IP SLP Goals       12/04/17 0915 12/02/17 1446       Safely Consume Diet    Safely Consume Diet- SLP, Date Established  12/02/17  -CP     Safely Consume Diet- SLP, Time to Achieve  by discharge  -CP     Safely Consume Diet- SLP, Outcome goal ongoing  -SH        User Key  (r) = Recorded By, (t) = Taken By, (c) = Cosigned By    Initials Name Provider Type    CP Mei Park, MS CCC-SLP Speech and Language Pathologist     Beronica Devi MS CCC-SLP Speech and Language Pathologist             SLP Outcome Measures (last 72 hours)      SLP Outcome Measures       12/04/17 0900 12/02/17 1400       SLP Outcome Measures    Outcome Measure Used? Adult NOMS  -SH Adult NOMS  -CP     FCM Scores    FCM Chosen Swallowing  -SH Swallowing  -CP     Swallowing FCM Score 5  -SH 4  -CP       User Key  (r) = Recorded By, (t) = Taken By, (c) = Cosigned By    Initials Name  Effective Dates    CP Mei Park MS CCC-SLP 04/13/15 -      Beronica Devi MS CCC-SLP 07/13/17 -            Time Calculation:         Time Calculation- SLP       12/04/17 0924          Time Calculation- [facility]    SLP Start Time 0800  -      SLP Stop Time 0900  -      SLP Time Calculation (min) 60 min  -      SLP Received On 12/04/17  -        User Key  (r) = Recorded By, (t) = Taken By, (c) = Cosigned By    Initials Name Provider Type     Beronica Devi MS CCC-SLP Speech and Language Pathologist          Therapy Charges for Today     Code Description Service Date Service Provider Modifiers Qty    [id number] HC ST MOTION FLUORO EVAL SWALLOW 4 12/4/2017 Beronica Devi MS CCC-SLP GN 1               Beronica Devi MS CCC-MASSIEL  12/4/2017

## 2017-12-04 NOTE — PAYOR COMM NOTE
"Angelita Carter (52 y.o. Female)     Date of Birth Social Security Number Address Home Phone MRN    1965  [address] [id number] [phone] [id number]    Yarsanism Marital Status          Presybeterian        Admission Date Admission Type Admitting Provider Attending Provider Department, Room/Bed    12/1/17 Emergency Aly Drew MD Anaya, Fadi ELDRIDGE MD [address], P578/1    Discharge Date Discharge Disposition Discharge Destination                      Attending Provider: Fadi Mathews MD     Allergies:  Sulfa Antibiotics, Beta Adrenergic Blockers, Codeine, Erythromycin, Penicillins, Tetracyclines & Related, Varenicline    Isolation:  None   Infection:  None   Code Status:  FULL    Ht:  65\" (165.1 cm)   Wt:  146 lb (66.2 kg)    Admission Cmt:  None   Principal Problem:  None                Active Insurance as of 12/1/2017     Primary Coverage     Payor Plan Insurance Group Employer/Plan Group    [facility] BLUE CROSS [employer] [facility] [employer] [id number]     Payor Plan Address Payor Plan Phone Number Effective From Effective To    [address] [phone] 1/1/2015     [place] [address]       Subscriber Name Subscriber Birth Date Member ID       ANGELITA CARTER 1965 [id number]                 Emergency Contacts      (Rel.) Home Phone Work Phone Mobile Phone    Letty Renner (Daughter) -- [phone]    Bekah Rosales (Relative) -- -- [phone]        ATTN: NURSE REVIEW   REF#[id number]  PLEASE CALL BACK TO TERRI [phone] OR [phone]  THANKS!  TERRI       History & Physical      Aly Drew MD at 12/1/2017  5:01 PM          Group: [facility]         H/p  NOTE    Patient Identification:  Angelita Carter  52 y.o.  female  1965  [id number]                 CC:     History of Present Illness:  Very pleasant 52-year-old female history of tobacco abuse presented to [facility] team D.  " She was noted to have left-sided hemiparesis and initial NIH of 3 up to 10.  She was given TPA per neurology.  We were asked to admit to the ICU.  Patient currently with some improvement neurologically.  She is awake alert and complains of some headache.  Apparently she did have a fall at home and hit her head on the table.  Denies any chest pain no shortness of breath at this time.  No previous history of CVA.  History of MI in the past.      Review of Systems  Constitutional: Negative for chills and fever.   HENT: Negative for sore throat and trouble swallowing.    Eyes: Negative for visual disturbance.   Respiratory: Negative for cough and shortness of breath.    Cardiovascular: Negative for chest pain, palpitations and leg swelling.   Gastrointestinal: Negative for abdominal pain, diarrhea and vomiting.   Endocrine: Negative.    Genitourinary: Negative for decreased urine volume, dysuria and frequency.   Musculoskeletal: Negative for neck pain.   Skin: Negative for rash.   Allergic/Immunologic: Negative.    Neurological: Positive for facial asymmetry (Left), weakness (Left -sided) and headaches. Negative for syncope and numbness.   Hematological: Negative.    Psychiatric/Behavioral: Negative.    All other systems reviewed and are negative.  Past Medical History:  Past Medical History:   Diagnosis Date   • Allergic rhinitis    • Ankle swelling    • Anxiety    • Arthritis    • Asthma    • Attention deficit hyperactivity disorder    • Back pain    • Chest pain    • Depression    • Esophageal reflux    • High cholesterol     Reported cholesterol level was high   • History of colonoscopy     Complete colonoscopy   • Hypercholesterolemia    • Hypertension    • IBS (irritable bowel syndrome)    • Insomnia    • Multiple benign polyps of large intestine    • Osteopenia    • Palpitations    • Rotator cuff tendonitis    • Seborrheic dermatitis    • Tendinitis, de Quervain's    • Tobacco use    • Vitamin B12 deficiency    •  "Vitamin D deficiency        Past Surgical History:  Past Surgical History:   Procedure Laterality Date   • COLONOSCOPY     • DIAGNOSTIC LAPAROSCOPY EXPLORATORY LAPAROTOMY     • ELBOW ARTHROPLASTY Right     Elbow surgery   • HYSTERECTOMY  1999    Including both ovaries   • TONSILLECTOMY     • WRIST SURGERY Left         Home Meds:    (Not in a hospital admission)    Allergies:  Allergies   Allergen Reactions   • Sulfa Antibiotics Shortness Of Breath   • Beta Adrenergic Blockers      Other reaction(s): Syncope   • Codeine    • Erythromycin      Other reaction(s): Abdominal pain   • Penicillins Hives   • Tetracyclines & Related      Other reaction(s): Abdominal pain   • Varenicline      Other reaction(s): Angina       Social History:   Social History     Social History   • Marital status:      Spouse name: N/A   • Number of children: N/A   • Years of education: N/A     Occupational History   • Not on file.     Social History Main Topics   • Smoking status: Current Every Day Smoker     Packs/day: 1.50   • Smokeless tobacco: Never Used      Comment: caffeine use   • Alcohol use Yes      Comment: twice monthly   • Drug use: No   • Sexual activity: Not on file     Other Topics Concern   • Not on file     Social History Narrative       Family History:  Family History   Problem Relation Age of Onset   • Heart attack Mother    • Heart disease Mother    • Skin cancer Mother    • Lung cancer Father    • Heart attack Brother    • Heart disease Brother      CABG; Pacemaker Placement   • Colon cancer Brother 61   • Heart disease Maternal Grandmother    • Anxiety disorder Other      (Symptom)   • Colon cancer Other    • Depression Other        Physical Exam:  /89  Pulse 90  Temp 98.7 °F (37.1 °C) (Tympanic)   Resp 16  Ht 65\" (165.1 cm)  Wt 149 lb 0.5 oz (67.6 kg)  SpO2 97%  BMI 24.8 kg/m2 Body mass index is 24.8 kg/(m^2). 97% 149 lb 0.5 oz (67.6 kg)  Physical Exam  Awake no distress no labored breathing  No " respiratory distress  Neck is supple no bruit no adenopathy  Chest is clear no wheezing or rales  CVS regular rate and rhythm no murmurs no gallop  Normal sinus rhythm on the monitor  Abdomen is soft no masses felt nontender  Extremities no edema the sinuses no clubbing  CNS with left hemiparesis and left facial droop noted  No hallucination or delusion or joint deformities or skin rashes    LABS:  Lab Results   Component Value Date    CALCIUM 8.9 12/01/2017     Results from last 7 days  Lab Units 12/01/17  1537   SODIUM mmol/L 142   POTASSIUM mmol/L 2.9*   CHLORIDE mmol/L 98   CO2 mmol/L 30.0*   BUN mg/dL 5*   CREATININE mg/dL 0.75   GLUCOSE mg/dL 106*   CALCIUM mg/dL 8.9   WBC 10*3/mm3 9.02   HEMOGLOBIN g/dL 16.4*   PLATELETS 10*3/mm3 300   ALT (SGPT) U/L 24   AST (SGOT) U/L 26     No results found for: CKTOTAL, CKMB, CKMBINDEX, TROPONINI, TROPONINT                        Results from last 7 days  Lab Units 12/01/17  1537   INR  0.96         No results found for: TSH  Estimated Creatinine Clearance: 91.5 mL/min (by C-G formula based on Cr of 0.75).         Imaging: I personally visualized the images of scans/x-rays performed within last 3 days.      Assessment:  Acute CVA status post TPA  Elevated blood pressure  History of tobacco abuse  Hypokalemia    Recommendations:  Post TPA stroke protocol  Neuro checks per protocol  Blood pressure management as per stroke protocol  Replace electrolytes  ICU core measures  Post TPA swallow evaluation  IV fluids normal saline  Discussed with family  Admit to ICU            Aly Drew MD  12/1/2017  5:02 PM      Much of this encounter note is an electronic transcription/translation of spoken language to printed text using Dragon Software.     Electronically signed by Aly Drew MD at 12/1/2017  5:07 PM           Emergency Department Notes      Shira Lawrence RN at 12/1/2017  3:30 PM          Patient stated she was not feeling well all day, weak, could barely walk.  Stated her right side gave out on her and she fell and hit her head on the kitchen table. Complains of a headache due to fall on the right back side of head. Also states she has not been feeling well for 4 weeks and had an US for gallbladder and has not received the results yet.      Shira Lawrence RN  12/01/17 1639       Shira Lawrence RN  12/01/17 1728       Electronically signed by Shira Lawrence RN at 12/1/2017  5:28 PM      Colton Leavitt MD at 12/1/2017  3:34 PM      Procedure Orders:    1. Critical Care [[id number]] ordered by Colton Leavitt MD at 12/01/17 2121                 EMERGENCY DEPARTMENT ENCOUNTER    CHIEF COMPLAINT  Chief Complaint: Neuro deficits  History given by: Patient and EMS  History limited by: Nothing  Room Number: 387/1  PMD: Ivonne Bradley MD      HPI:  Pt is a 52 y.o. female who presents complaining of neuro deficits onset 1300 today. The pt was talking to family on the phone when the pt states she felt weak and fell to the right. The pt was last known normal at 1300 when she was talking on the phone with family. The pt's family was unable to get in contact of the pt, so EMS was called. The pt states she hit her head during the fall. Pt reports HA, left facial paralysis, and left sided weakness. Pt denies CP.    When EMS arrived, they state the pt had left facial droop and left sided weakness. EMS states the pt was unable to squeeze on the left upon initial encounter, but they state the pt has improved since their first exam. The pt had a BS of 99 per EMS.      Duration: Since 1300 today  Onset: Sudden  Timing: Constant  Location: Left sided  Radiation: None  Quality: Weakness  Intensity/Severity: Moderate  Progression: Improving per EMS  Associated Symptoms: Pt reports HA, left facial paralysis, and left sided weakness.  Aggravating Factors: None stated  Alleviating Factors: None stated  Previous Episodes: None  Treatment before arrival: Nothing    PAST MEDICAL HISTORY  Active  Ambulatory Problems     Diagnosis Date Noted   • Complete tear of right rotator cuff 03/22/2016   • Precordial pain 09/13/2017   • Dyspnea on exertion 09/13/2017   • Palpitations 09/13/2017   • Essential hypertension 09/13/2017   • Hyperlipidemia 09/13/2017   • Tobacco abuse 09/13/2017     Resolved Ambulatory Problems     Diagnosis Date Noted   • No Resolved Ambulatory Problems     Past Medical History:   Diagnosis Date   • Allergic rhinitis    • Ankle swelling    • Anxiety    • Arthritis    • Asthma    • Attention deficit hyperactivity disorder    • Back pain    • Chest pain    • CHF (congestive heart failure)    • COPD (chronic obstructive pulmonary disease)    • Coronary artery disease    • Depression    • Esophageal reflux    • High cholesterol    • History of colonoscopy    • Hypercholesterolemia    • Hypertension    • IBS (irritable bowel syndrome)    • Insomnia    • Osteopenia    • Palpitations    • Rotator cuff tendonitis    • Seborrheic dermatitis    • Stroke    • Tendinitis, de Quervain's    • Tobacco use    • Vitamin B12 deficiency    • Vitamin D deficiency        PAST SURGICAL HISTORY  Past Surgical History:   Procedure Laterality Date   • COLONOSCOPY     • DIAGNOSTIC LAPAROSCOPY EXPLORATORY LAPAROTOMY     • ELBOW ARTHROPLASTY Right     Elbow surgery   • FRACTURE SURGERY     • HYSTERECTOMY  1999    Including both ovaries   • TONSILLECTOMY     • WRIST SURGERY Left        FAMILY HISTORY  Family History   Problem Relation Age of Onset   • Heart attack Mother    • Heart disease Mother    • Skin cancer Mother    • Lung cancer Father    • Heart attack Brother    • Heart disease Brother      CABG; Pacemaker Placement   • Colon cancer Brother 61   • Heart disease Maternal Grandmother    • Anxiety disorder Other      (Symptom)   • Colon cancer Other    • Depression Other        SOCIAL HISTORY  Social History     Social History   • Marital status:      Spouse name: N/A   • Number of children: N/A   • Years  of education: N/A     Occupational History   • Not on file.     Social History Main Topics   • Smoking status: Current Every Day Smoker     Packs/day: 1.50   • Smokeless tobacco: Never Used      Comment: caffeine use   • Alcohol use Yes      Comment: twice monthly-socially   • Drug use: No   • Sexual activity: Not Currently     Other Topics Concern   • Not on file     Social History Narrative       ALLERGIES  Sulfa antibiotics; Beta adrenergic blockers; Codeine; Erythromycin; Penicillins; Tetracyclines & related; and Varenicline    REVIEW OF SYSTEMS  Review of Systems   Constitutional: Negative for chills and fever.   HENT: Negative for sore throat and trouble swallowing.    Eyes: Negative for visual disturbance.   Respiratory: Negative for cough and shortness of breath.    Cardiovascular: Negative for chest pain, palpitations and leg swelling.   Gastrointestinal: Negative for abdominal pain, diarrhea and vomiting.   Endocrine: Negative.    Genitourinary: Negative for decreased urine volume, dysuria and frequency.   Musculoskeletal: Negative for neck pain.   Skin: Negative for rash.   Allergic/Immunologic: Negative.    Neurological: Positive for facial asymmetry (Left), weakness (Left -sided) and headaches. Negative for syncope and numbness.   Hematological: Negative.    Psychiatric/Behavioral: Negative.    All other systems reviewed and are negative.      PHYSICAL EXAM  ED Triage Vitals   Temp Heart Rate Resp BP SpO2   -- 12/01/17 1529 12/01/17 1529 12/01/17 1529 12/01/17 1529    88 16 95/63 100 %      Temp src Heart Rate Source Patient Position BP Location FiO2 (%)   -- -- -- -- --              Physical Exam   Constitutional: She is oriented to person, place, and time.   HENT:   Head: Normocephalic and atraumatic.   Eyes: EOM are normal. Pupils are equal, round, and reactive to light.   Neck: Normal range of motion. Neck supple.   Cardiovascular: Normal rate, regular rhythm and normal heart sounds.     Pulmonary/Chest: Effort normal and breath sounds normal. No respiratory distress.   Abdominal: Soft. There is no tenderness. There is no rebound and no guarding.   Musculoskeletal: She exhibits no edema.   Neurological: She is alert and oriented to person, place, and time. She has normal sensation and normal strength. She displays facial asymmetry (Mild left).   The pt has weakness to the left arm. The pt has left-sided sensory deficit.   Skin: Skin is warm and dry. No rash noted.   Nursing note and vitals reviewed.      LAB RESULTS  Lab Results (last 24 hours)     Procedure Component Value Units Date/Time    CBC & Differential [[id number]] Collected:  12/01/17 1537    Specimen:  Blood Updated:  12/01/17 1628    Narrative:       The following orders were created for panel order CBC & Differential.  Procedure                               Abnormality         Status                     ---------                               -----------         ------                     Scan Slide[[id number]]                   Normal              Final result               CBC Auto Differential[[id number]]        Abnormal            Final result                 Please view results for these tests on the individual orders.    Comprehensive Metabolic Panel [[id number]]  (Abnormal) Collected:  12/01/17 1537    Specimen:  Blood Updated:  12/01/17 1608     Glucose 106 (H) mg/dL      BUN 5 (L) mg/dL      Creatinine 0.75 mg/dL      Sodium 142 mmol/L      Potassium 2.9 (L) mmol/L      Chloride 98 mmol/L      CO2 30.0 (H) mmol/L      Calcium 8.9 mg/dL      Total Protein 7.0 g/dL      Albumin 4.00 g/dL      ALT (SGPT) 24 U/L      AST (SGOT) 26 U/L      Alkaline Phosphatase 88 U/L      Total Bilirubin 0.8 mg/dL      eGFR Non African Amer 81 mL/min/1.73      Globulin 3.0 gm/dL      A/G Ratio 1.3 g/dL      BUN/Creatinine Ratio 6.7 (L)     Anion Gap 14.0 mmol/L     Protime-INR [[id number]]  (Normal) Collected:  12/01/17 1537    Specimen:  Blood  Updated:  12/01/17 1601     Protime 12.4 Seconds      INR 0.96    CBC Auto Differential [[id number]]  (Abnormal) Collected:  12/01/17 1537    Specimen:  Blood Updated:  12/01/17 1552     WBC 9.02 10*3/mm3      RBC 4.42 10*6/mm3      Hemoglobin 16.4 (H) g/dL      Hematocrit 47.7 (H) %      .9 (H) fL      MCH 37.1 (H) pg      MCHC 34.4 g/dL      RDW 15.6 (H) %      RDW-SD 62.0 (H) fl      MPV 10.0 fL      Platelets 300 10*3/mm3      Neutrophil % 67.6 %      Lymphocyte % 25.2 %      Monocyte % 6.5 %      Eosinophil % 0.3 %      Basophil % 0.2 %      Immature Grans % 0.2 %      Neutrophils, Absolute 6.09 10*3/mm3      Lymphocytes, Absolute 2.27 10*3/mm3      Monocytes, Absolute 0.59 10*3/mm3      Eosinophils, Absolute 0.03 10*3/mm3      Basophils, Absolute 0.02 10*3/mm3      Immature Grans, Absolute 0.02 10*3/mm3      nRBC 0.2 (H) /100 WBC     Scan Slide [[id number]]  (Normal) Collected:  12/01/17 1537    Specimen:  Blood Updated:  12/01/17 1628     RBC Morphology Normal     WBC Morphology Normal     Platelet Morphology Normal    POC Glucose Fingerstick [[id number]]  (Normal) Collected:  12/01/17 1807    Specimen:  Blood Updated:  12/01/17 1827     Glucose 113 mg/dL     Narrative:       Meter: [id number] : [id number] Ezequiel Albrecht          I ordered the above labs and reviewed the results    RADIOLOGY  XR Chest 1 View    (Results Pending)   CT Cerebral Perfusion With & Without Contrast    (Results Pending)   CT Angiogram Neck With & Without Contrast    (Results Pending)   CT Angiogram Head With & Without Contrast    (Results Pending)      CT scans negative for hemorrhage  CXR - Negative acute    I ordered the above noted radiological studies. Interpreted by radiologist. Reviewed by me in PACS.       PROCEDURES  Critical Care  Performed by: ALEYDA SANTO  Authorized by: ALEYDA SANTO     Critical care provider statement:     Critical care time (minutes):  35    Critical care time was exclusive of:   Separately billable procedures and treating other patients    Critical care was necessary to treat or prevent imminent or life-threatening deterioration of the following conditions:  CNS failure or compromise    Critical care was time spent personally by me on the following activities:  Development of treatment plan with patient or surrogate, discussions with consultants, evaluation of patient's response to treatment, examination of patient, obtaining history from patient or surrogate, ordering and performing treatments and interventions, ordering and review of laboratory studies, pulse oximetry and re-evaluation of patient's condition        Interval: baseline  1a. Level Of Consciousness: 0-->Alert: keenly responsive  1b. LOC Questions: 0-->Answers both questions correctly  1c. LOC Commands: 0-->Performs both tasks correctly  2. Best Gaze: 0-->Normal  3. Visual: 0-->No visual loss  4. Facial Palsy: 1-->Minor paralysis (flattened nasolabial fold, asymmetry on smiling)  5a. Motor Arm, Left: 1-->Drift: limb holds 90 (or 45) degrees, but drifts down before full 10 seconds: does not hit bed or other support  5b. Motor Arm, Right: 0-->No drift: limb holds 90 (or 45) degrees for full 10 secs  6a. Motor Leg, Left: 0-->No drift: leg holds 30 degree position for full 5 secs  6b. Motor Leg, Right: 0-->No drift: leg holds 30 degree position for full 5 secs  7. Limb Ataxia: 0-->Absent  8. Sensory: 1-->Mild-to-moderate sensory loss: patient feels pinprick is less sharp or is dull on the affected side: or there is a loss of superficial pain with pinprick, but patient is aware of being touched  9. Best Language: 0-->No aphasia: normal  10. Dysarthria: 0-->Normal  11. Extinction and Inattention (formerly Neglect): 0-->No abnormality    Total (NIH Stroke Scale): 3      EKG           EKG time: 1639  Rhythm/Rate: 95 Normal Sinus  P waves and OR: Normal  QRS, axis: Normal   ST and T waves: Nonspecific ST depression     Interpreted  Contemporaneously by me, independently viewed  ST depression is changed compared to prior Feb, 2004      PROGRESS AND CONSULTS  ED Course     1531  Received a call from Dr. Bunch and discussed pt's case. Dr. Bunch agreed with plan to order the CT scan. He agrees with the plan to administer tPA if the CT shows no hemorrhage.    1536  CXR, EKG, CT Head, and labs ordered for further evaluation.    1550  CTA Head, CTA Neck, and CT Cerebral Perfusion ordered for further evaluation.    1621  Dr. Bunch is in the ED and is evaluating the pt after the pt has received tPA.    1633  Consult placed to Pulmonology.    1642  Received a call from Dr. Drew and discussed pt's case. Dr. Drew agreed with plan to admit the pt.    1711  Potassium chloride ordered.      MEDICAL DECISION MAKING  Results were reviewed/discussed with the patient and they were also made aware of online access. Pt also made aware that some labs, such as cultures, will not be resulted during ER visit and follow up with PMD is necessary.     MDM  Number of Diagnoses or Management Options  Cerebrovascular accident (CVA), unspecified mechanism:      Amount and/or Complexity of Data Reviewed  Clinical lab tests: ordered and reviewed (Potassium - 2.9)  Tests in the radiology section of CPT®:  reviewed and ordered (CT Scans showed no hemorrhage  CXR - Negative acute)  Tests in the medicine section of CPT®:  reviewed and ordered (Refer to the procedure section of the note for EKG results  )  Discussion of test results with the performing providers: yes (Dr. Drew)  Discuss the patient with other providers: yes (Dr. Bunch)    Patient Progress  Patient progress: stable         DIAGNOSIS  Final diagnoses:   Cerebrovascular accident (CVA), unspecified mechanism   Hypokalemia       DISPOSITION  ADMISSION    Discussed treatment plan and reason for admission with pt/family and admitting physician.  Pt/family voiced understanding of the plan for admission for  further testing/treatment as needed.         Latest Documented Vital Signs:  As of 6:40 PM  BP- 135/88 HR- 91 Temp- 97.8 °F (36.6 °C) (Oral) O2 sat- 95%    --  Documentation assistance provided by patti Salinas for Dr. Leavitt.  Information recorded by the scribe was done at my direction and has been verified and validated by me.       Spencer Salinas  12/01/17 1841       Colton Leavitt MD  12/01/17 2122       Electronically signed by Colton Leavitt MD at 12/1/2017  9:22 PM      Shira Lawrence RN at 12/1/2017  4:57 PM          States she continues to have a headache from the fall, has not change in intensity during TPA.      Shira Lawrence RN  12/01/17 1878       Electronically signed by Shira Lawrence RN at 12/1/2017  4:58 PM        Hospital Medications (all)       Dose Frequency Start End    acetaminophen (TYLENOL) tablet 650 mg 650 mg Every 6 Hours PRN 12/2/2017     Sig - Route: Take 2 tablets by mouth Every 6 (Six) Hours As Needed for Mild Pain . - Oral    alteplase (ACTIVASE) 100 mg kit 54.7 mg Once 12/1/2017 12/1/2017    Sig - Route: Infuse 54.7 mL into a venous catheter 1 (One) Time. - Intravenous    alteplase (ACTIVASE) bolus from vial 6.1 mg Once 12/1/2017 12/1/2017    Sig - Route: Infuse 6.1 mL into a venous catheter 1 (One) Time. - Intravenous    amLODIPine (NORVASC) tablet 5 mg 5 mg Daily 12/3/2017     Sig - Route: Take 1 tablet by mouth Daily. - Oral    atorvastatin (LIPITOR) tablet 80 mg 80 mg Nightly 12/1/2017     Sig - Route: Take 1 tablet by mouth Every Night. - Oral    famotidine (PEPCID) tablet 20 mg 20 mg 2 Times Daily 12/4/2017     Sig - Route: Take 1 tablet by mouth 2 (Two) Times a Day. - Oral    gadobenate dimeglumine (MULTIHANCE) injection 13 mL 13 mL Once in Imaging 12/3/2017 12/3/2017    Sig - Route: Infuse 13 mL into a venous catheter Once. - Intravenous    HYDROcodone-acetaminophen (NORCO) 5-325 MG per tablet 1 tablet 1 tablet Every 6 Hours PRN 12/2/2017 12/12/2017    Sig - Route:  "Take 1 tablet by mouth Every 6 (Six) Hours As Needed for Moderate Pain . - Oral    HYDROmorphone (DILAUDID) injection 0.5 mg 0.5 mg Every 2 Hours PRN 12/2/2017 12/3/2017    Sig - Route: Infuse 0.5 mL into a venous catheter Every 2 (Two) Hours As Needed for Severe Pain . - Intravenous    iopamidol (ISOVUE-370) 76 % injection 150 mL 150 mL Once in Imaging 12/1/2017 12/1/2017    Sig - Route: Infuse 150 mL into a venous catheter Once. - Intravenous    Magnesium Sulfate 2 gram Bolus, followed by 8 gram infusion (total Mg dose 10 grams)- Mg less than or equal to 1mg/dL 2 g As Needed 12/2/2017     Sig - Route: Infuse 50 mL into a venous catheter As Needed (Mg less than or equal to 1mg/dL). - Intravenous    Linked Group 1:  \"Or\" Linked Group Details        magnesium sulfate 4 gram infusion- Mg 1.6-1.9 mg/dL 4 g As Needed 12/2/2017     Sig - Route: Infuse 100 mL into a venous catheter As Needed (Mg 1.6-1.9 mg/dL). - Intravenous    Linked Group 1:  \"Or\" Linked Group Details        Magnesium Sulfate 6 gram Infusion (2 gm x 3) -Mg 1.1 -1.5 mg/dL 2 g As Needed 12/2/2017     Sig - Route: Infuse 50 mL into a venous catheter As Needed (Mg 1.1 -1.5 mg/dL). - Intravenous    Linked Group 1:  \"Or\" Linked Group Details        ondansetron (ZOFRAN) injection 4 mg 4 mg Every 6 Hours PRN 12/2/2017     Sig - Route: Infuse 2 mL into a venous catheter Every 6 (Six) Hours As Needed for Nausea or Vomiting. - Intravenous    potassium chloride (KLOR-CON) packet 40 mEq 40 mEq As Needed 12/1/2017     Sig - Route: Take 40 mEq by mouth As Needed (potassium replacement, see admin instructions). - Oral    Linked Group 2:  \"Or\" Linked Group Details        potassium chloride (MICRO-K) CR capsule 40 mEq 40 mEq As Needed 12/1/2017     Sig - Route: Take 4 capsules by mouth As Needed (potassium replacement.  see admin instructions). - Oral    Linked Group 2:  \"Or\" Linked Group Details        potassium chloride 10 mEq in 100 mL IVPB 10 mEq Once 12/1/2017 " "12/1/2017    Sig - Route: Infuse 100 mL into a venous catheter 1 (One) Time. - Intravenous    potassium chloride 10 mEq in 100 mL IVPB 10 mEq Every 1 Hour PRN 12/1/2017     Sig - Route: Infuse 100 mL into a venous catheter Every 1 (One) Hour As Needed (potassium protocol PERIPHERAL - see admin instructions). - Intravenous    Linked Group 2:  \"Or\" Linked Group Details        rOPINIRole (REQUIP) tablet 1 mg 1 mg 2 Times Daily 12/2/2017     Sig - Route: Take 1 tablet by mouth 2 (Two) Times a Day. - Oral    rOPINIRole (REQUIP) tablet 1 mg 1 mg 2 Times Daily PRN 12/3/2017     Sig - Route: Take 1 tablet by mouth 2 (Two) Times a Day As Needed (leg cramps). - Oral    sodium chloride 0.9 % flush 1-10 mL 1-10 mL As Needed 12/1/2017     Sig - Route: Infuse 1-10 mL into a venous catheter As Needed for Line Care. - Intravenous    sodium chloride 0.9 % flush 10 mL 10 mL As Needed 12/1/2017     Sig - Route: Infuse 10 mL into a venous catheter As Needed for Line Care. - Intravenous    Cosign for Ordering: Accepted by Colton Leavitt MD on 12/1/2017  9:41 PM    sodium chloride 0.9 % infusion 100 mL/hr Once 12/1/2017 12/1/2017    Sig - Route: Infuse 100 mL/hr into a venous catheter 1 (One) Time. - Intravenous    sodium chloride 0.9 % infusion 75 mL/hr Continuous 12/1/2017     Sig - Route: Infuse 75 mL/hr into a venous catheter Continuous. - Intravenous    aspirin suppository 300 mg (Discontinued) 300 mg Daily 12/2/2017 12/2/2017    Sig - Route: Insert 1 suppository into the rectum Daily. - Rectal    Linked Group 3:  \"Or\" Linked Group Details        aspirin tablet 325 mg (Discontinued) 325 mg Daily 12/2/2017 12/2/2017    Sig - Route: Take 1 tablet by mouth Daily. - Oral    Linked Group 3:  \"Or\" Linked Group Details        famotidine (PEPCID) injection 20 mg (Discontinued) 20 mg Every 12 Hours Scheduled 12/1/2017 12/4/2017    Sig - Route: Infuse 2 mL into a venous catheter Every 12 (Twelve) Hours. - Intravenous             Physician " "Progress Notes (last 7 days) (Notes from 2017 10:56 AM through 2017 10:56 AM)      Timothy Bunch MD at 2017  9:42 AM  Version 1 of 1         Progress Note      PATIENT Angelita Brambila    1965   MRN [id number]   ADMIT DATE 2017   LENGTH OF STAY 1 days   ATTENDING Aly Drew MD       CHIEF COMPLAINT: Neuro Deficit(s)      DIAGNOSIS: Hypokalemia [E87.6]  CVA (cerebral vascular accident) [I63.9]  Cerebrovascular accident (CVA), unspecified mechanism [I63.9]    HISTORY OF PRESENT ILLNESS: The patient complains of right-sided headache.  She will get intermittent headaches which she blames on allergies but this feels different.  He rates it as 10/10.  He has no other complaints.  Nurses have noted no change in her neurologic exam since coming to the ICU.    PHYSICAL EXAMINATION:  GENERAL: Reveals a man/woman who appears his/her stated age, in no acute distress.  VITAL SIGNS: /87  Pulse 81  Temp 98.4 °F (36.9 °C) (Oral)   Resp 17  Ht 65\" (165.1 cm)  Wt 146 lb (66.2 kg)  LMP Comment: hysterectomy 1994  SpO2 94%  BMI 24.3 kg/m2  NECK: Carotids are equal without bruits.   HEART: Regular rate and rhythm with no significant murmur or gallop.   EXTREMITIES: Nonedematous. Pulses are intact. There is no rash. There is no hepatosplenomegaly.   NEUROLOGIC: He is awake and alert. He is oriented x3. There is no aphasia or significant dysarthria.  She has a left homonymous hemianopsia.  She has a left upper motor neuron facial weakness.  Left arm is weak but can clearly defy gravity but does drift.  There is mild weakness of the left psoas but all the muscle groups in the left lower extremity are normal.  Very severe sensory deficit in the left arm and moderate in the left leg.      DIAGNOSTIC DATA: none    IMPRESSION AND PLAN: The patient is unchanged since she arrived in the emergency room.  He has tolerated TPA with no significant side effects.  I suspect the HA is from the CVA " but given the recent TPA will got CT now to r/o blood and then do CT at 24 hour tasia also.    MRI and Echo tomorrow.    Will ask Dr. Monson to see regarding carotid stenosis and discussed with him.    Will x-ray right wrist because of patient concern mainly.    Timothy Bunch MD  12/2/2017  9:42 AM     Electronically signed by Timothy Bunch MD at 12/2/2017  9:52 AM      Timothy Bunch MD at 12/2/2017 10:56 AM  Version 1 of 1         Neuro    Reviewed CT and there is some blood in infarct.  No significant mass effect and would not expect this to result in neurologic decline, which it has not.    Will hold ASA for now.    No other treatment required for above blood.  Don't need to try to reverse TPA at his point in time.  Will proceed with MRI and echo tomorrow as scheduled.    JYOTI Bunch MD     Electronically signed by Timothy Bunch MD at 12/2/2017 10:59 AM      Fadi Mathews MD at 12/2/2017 11:01 AM  Version 1 of 1           Dr. JUANA Mathews    [facility] INTENSIVE CARE    12/2/2017    Patient ID:  Name:  Angelita Brambila  MRN:  [id number]  1965  52 y.o.  female            CC/Reason for visit: Follow-up for acute stroke    HPI: The patient complains of a mild headache.    Vitals:  Vitals:    12/02/17 0747 12/02/17 0802 12/02/17 0817 12/02/17 0832   BP: 132/86 116/75 121/76 139/87   BP Location:       Patient Position:       Pulse: 81 79 76 81   Resp:       Temp: 98.4 °F (36.9 °C)      TempSrc: Oral      SpO2: 92% 94% 92% 94%   Weight:       Height:               Body mass index is 24.3 kg/(m^2).    Intake/Output Summary (Last 24 hours) at 12/02/17 1101  Last data filed at 12/02/17 0445   Gross per 24 hour   Intake              300 ml   Output              975 ml   Net             -675 ml       Exam:  Neurologic exam: The patient is awake, alert and oriented ×3.  There is no dysarthria or aphasia.  She has some deficit in the left visual  field.  She has some weakness in the left upper extremity but does lift it with some drift.  She has some sensory deficit in the left arm  NECK:  Supple, midline trachea  LUNGS: Clear breath sounds bilat, nonlabored breathing  CV:  Normal S1S2, without murmur, no edema  ABD:  Non tender, no enlarged liver or masses  EXT:  No cyanosis or clubbing    Scheduled meds:    atorvastatin 80 mg Oral Nightly   famotidine 20 mg Intravenous Q12H     IV meds:                        sodium chloride 75 mL/hr Last Rate: 75 mL/hr (17 5924)       Data Review:   I reviewed the patient's medications and new clinical results.  Lab Results   Component Value Date    CALCIUM 8.9 2017       Results from last 7 days  Lab Units 17  1537   SODIUM mmol/L 142   POTASSIUM mmol/L 2.9*   CHLORIDE mmol/L 98   CO2 mmol/L 30.0*   BUN mg/dL 5*   CREATININE mg/dL 0.75   CALCIUM mg/dL 8.9   BILIRUBIN mg/dL 0.8   ALK PHOS U/L 88   ALT (SGPT) U/L 24   AST (SGOT) U/L 26   GLUCOSE mg/dL 106*   WBC 10*3/mm3 9.02   HEMOGLOBIN g/dL 16.4*   PLATELETS 10*3/mm3 300   INR  0.96         ASSESSMENT:   Right middle cerebral artery  CVA (cerebral vascular accident)  Hemorrhagic transformation of CVA  Left hemiparesis  Hypertension  Hypokalemia      PLAN:  We will continue to observe the patient closely.  Remain in ICU for now.  She does have some hemorrhagic transformation on recent CT of the head.    As per Dr. Bunch, we will stop aspirin for now.  She is status post TPA from yesterday.        Fadi Mathews MD  2017     Electronically signed by Fadi Mathews MD at 2017 11:06 AM      Timothy Bunch MD at 12/3/2017 11:41 AM  Version 3 of 3         Progress Note      PATIENT Angelita Brambila    1965   MRN [id number]   ADMIT DATE 2017   LENGTH OF STAY 2 days   ATTENDING Aly Drew MD       CHIEF COMPLAINT: Neuro Deficit(s)      DIAGNOSIS: Hypokalemia [E87.6]  CVA (cerebral vascular accident) [I63.9]  Cerebrovascular  "accident (CVA), unspecified mechanism [I63.9]    HISTORY OF PRESENT ILLNESS: The patient complains of headache on the right side of her head.   He has not noticed any change in neurologic status in particular over left-sided weakness and neither has a daughter over the last 24 hours.  He was up today walking with physical therapy apparently she needed a fair amount of assistance was able walk.  The patient lives by herself about 45 minutes for many.  Her   and She Lives There by Herself so the Daughter Is Concerned about That.  She does smoke cigarettes.    PHYSICAL EXAMINATION:  GENERAL: Reveals a man/woman who appears his/her stated age, in no acute distress.  VITAL SIGNS: /81  Pulse 75  Temp 98.3 °F (36.8 °C) (Oral)   Resp 17  Ht 65\" (165.1 cm)  Wt 146 lb (66.2 kg)  LMP Comment: hysterectomy   SpO2 (!) 89%  BMI 24.3 kg/m2  NECK: Carotids are equal without bruits.   HEART: Regular rate and rhythm with no significant murmur or gallop.   EXTREMITIES: Nonedematous. Pulses are intact. There is no rash. There is no hepatosplenomegaly.   NEUROLOGIC: He is awake and alert. He is oriented x3. There is no aphasia.  There is no significant dysarthria.  She has a left homonymous hemianopsia.  She can do find gravity with the left arm and left leg.  Left arm is spastic which is surprising.  She's never had a previous infarct.  She demonstrates weakness in the left distal greater than proximal upper extremity with a proximal about 4 and the distal 3plus.  He still has a significant sensory deficit in the left arm in particular.  Was also clumsy.  Hours normal in the left leg except for some mild weakness of hip flexion but the dorsiflexors of the left foot are normal.    I reviewed the MRI images of the brain done today and shows the same infarct in the right MCA distribution.  There is no infarcts in any other distribution.  There is blood scattered throughout the infarct but I am not convinced " that there is a more blood than there was yesterday.  I think we have demonstrated is a gradient echo imaging is more sensitive than CT at detecting blood.  There is mild mass effect but no shift.  DIAGNOSTIC DATA: MRI was performed as described above.    IMPRESSION AND PLAN: Clinically the patient is doing reasonably well.  Her deficit is actually less severe than I would anticipate by looking at her imaging.  Somewhat unusual to get spasticity this earlier but occasionally happens.  Her echocardiogram was unremarkable and given the  carotid stenosis on the left I would assume that that was the source of artery to artery embolism leading to the present symptoms.  The radiologist didn't feel they could definitively separate atherosclerotic lesion from a thrombus.  For practical perspective it doesn't really manner.  I'm not going to anticoagulate given this hemorrhagic infarct and there is really no firm indication for anticoagulation anyway.    I feel we should treat her with a statin which we are doing.  Would hold on any antiplatelets for a few weeks and then re-CT and decide.  I don't feel there is an indication for a carotid endarterectomy at this point.  However if this ends up being a nondisabling stroke it would be reasonable to repeat a CTA in a month or so.    No other workup is required at this point.  I feel she could be transferred out of the ICU.  (45 min with 30 min spent counselling family and coordinating care with luigi)        Timothy Bunch MD  12/3/2017  11:41 AM     Electronically signed by Timothy Bunch MD at 2017  7:53 AM      Timothy Bunch MD at 12/3/2017 11:41 AM  Version 2 of 3         Progress Note      PATIENT Angelita Brambila    1965   MRN [id number]   ADMIT DATE 2017   LENGTH OF STAY 2 days   ATTENDING Aly Drew MD       CHIEF COMPLAINT: Neuro Deficit(s)      DIAGNOSIS: Hypokalemia [E87.6]  CVA (cerebral vascular accident)  "[I63.9]  Cerebrovascular accident (CVA), unspecified mechanism [I63.9]    HISTORY OF PRESENT ILLNESS: The patient complains of headache on the right side of her head.   He has not noticed any change in neurologic status in particular over left-sided weakness and neither has a daughter over the last 24 hours.  He was up today walking with physical therapy apparently she needed a fair amount of assistance was able walk.  The patient lives by herself about 45 minutes for many.  Her   and She Lives There by Herself so the Daughter Is Concerned about That.  She does smoke cigarettes.    PHYSICAL EXAMINATION:  GENERAL: Reveals a man/woman who appears his/her stated age, in no acute distress.  VITAL SIGNS: /81  Pulse 75  Temp 98.3 °F (36.8 °C) (Oral)   Resp 17  Ht 65\" (165.1 cm)  Wt 146 lb (66.2 kg)  LMP Comment: hysterectomy   SpO2 (!) 89%  BMI 24.3 kg/m2  NECK: Carotids are equal without bruits.   HEART: Regular rate and rhythm with no significant murmur or gallop.   EXTREMITIES: Nonedematous. Pulses are intact. There is no rash. There is no hepatosplenomegaly.   NEUROLOGIC: He is awake and alert. He is oriented x3. There is no aphasia.  There is no significant dysarthria.  She has a left homonymous hemianopsia.  She can do find gravity with the left arm and left leg.  Left arm is spastic which is surprising.  She's never had a previous infarct.  She demonstrates weakness in the left distal greater than proximal upper extremity with a proximal about 4 and the distal 3plus.  He still has a significant sensory deficit in the left arm in particular.  Was also clumsy.  Hours normal in the left leg except for some mild weakness of hip flexion but the dorsiflexors of the left foot are normal.    I reviewed the MRI images of the brain done today and shows the same infarct in the right MCA distribution.  There is no infarcts in any other distribution.  There is blood scattered throughout the infarct " but I am not convinced that there is a more blood than there was yesterday.  I think we have demonstrated is a gradient echo imaging is more sensitive than CT at detecting blood.  There is mild mass effect but no shift.  DIAGNOSTIC DATA: MRI was performed as described above.    IMPRESSION AND PLAN: Clinically the patient is doing reasonably well.  Her deficit is actually less severe than I would anticipate by looking at her imaging.  Somewhat unusual to get spasticity this earlier but surely saphenous happened.  Her echocardiogram was unremarkable and given the divorce carotid stenosis on the left I would assume that that was the source of artery artery embolism leading to the present symptoms.  The radiologist didn't feel they could definitively separate and atherosclerotic lesion from a thrombus.  For practical perspective it doesn't really manner.  I'm not going to anticoagulate given this hemorrhagic infarct and there is really no firm indication for anticoagulation.    I feel we should treat her with aspirin and statin which we are doing.  I don't feel there is an indication for a carotid endarterectomy at this point.  However if this ends up being a nondisabling stroke fellow be reasonable to repeat a CTA in a month or so.    No other workup is required at this point.  I feel she could be transferred out of the ICU.  (45 min with 30 min spent counselling family and coordinating care with luigi)        Timothy Bunch MD  12/3/2017  11:41 AM     Electronically signed by Timothy Bunch MD at 12/3/2017 11:49 AM      Timothy Bunch MD at 12/3/2017 11:41 AM  Version 1 of 3         Progress Note      PATIENT Angelita Brambila    1965   MRN [id number]   ADMIT DATE 2017   LENGTH OF STAY 2 days   ATTENDING Aly Drew MD       CHIEF COMPLAINT: Neuro Deficit(s)      DIAGNOSIS: Hypokalemia [E87.6]  CVA (cerebral vascular accident) [I63.9]  Cerebrovascular accident (CVA), unspecified  "mechanism [I63.9]    HISTORY OF PRESENT ILLNESS: The patient complains of headache on the right side of her head.   He has not noticed any change in neurologic status in particular over left-sided weakness and neither has a daughter over the last 24 hours.  He was up today walking with physical therapy apparently she needed a fair amount of assistance was able walk.  The patient lives by herself about 45 minutes for many.  Her   and She Lives There by Herself so the Daughter Is Concerned about That.  She does smoke cigarettes.    PHYSICAL EXAMINATION:  GENERAL: Reveals a man/woman who appears his/her stated age, in no acute distress.  VITAL SIGNS: /81  Pulse 75  Temp 98.3 °F (36.8 °C) (Oral)   Resp 17  Ht 65\" (165.1 cm)  Wt 146 lb (66.2 kg)  LMP Comment: hysterectomy   SpO2 (!) 89%  BMI 24.3 kg/m2  NECK: Carotids are equal without bruits.   HEART: Regular rate and rhythm with no significant murmur or gallop.   EXTREMITIES: Nonedematous. Pulses are intact. There is no rash. There is no hepatosplenomegaly.   NEUROLOGIC: He is awake and alert. He is oriented x3. There is no aphasia.  There is no significant dysarthria.  She has a left homonymous hemianopsia.  She can do find gravity with the left arm and left leg.  Left arm is spastic which is surprising.  She's never had a previous infarct.  She demonstrates weakness in the left distal greater than proximal upper extremity with a proximal about 4 and the distal 3plus.  He still has a significant sensory deficit in the left arm in particular.  Was also clumsy.  Hours normal in the left leg except for some mild weakness of hip flexion but the dorsiflexors of the left foot are normal.    I reviewed the MRI images of the brain done today and shows the same infarct in the right MCA distribution.  There is no infarcts in any other distribution.  There is blood scattered throughout the infarct but I am not convinced that there is a more blood " than there was yesterday.  I think we have demonstrated is a gradient echo imaging is more sensitive than CT at detecting blood.  There is mild mass effect but no shift.  DIAGNOSTIC DATA: MRI was performed as described above.    IMPRESSION AND PLAN: Clinically the patient is doing reasonably well.  Her deficit is actually less severe than I would anticipate by looking at her imaging.  Somewhat unusual to get spasticity this earlier but surely saphenous happened.  Her echocardiogram was unremarkable and given the divorce carotid stenosis on the left I would assume that that was the source of artery artery embolism leading to the present symptoms.  The radiologist didn't feel they could definitively separate and atherosclerotic lesion from a thrombus.  For practical perspective it doesn't really manner.  I'm not going to anticoagulate given this hemorrhagic infarct and there is really no firm indication for anticoagulation.    I feel we should treat her with aspirin and statin which we are doing.  I don't feel there is an indication for a carotid endarterectomy at this point.  However if this ends up being a nondisabling stroke fellow be reasonable to repeat a CTA in a month or so.    No other workup is required at this point.  I feel she could be transferred out of the ICU.        Timothy Bunch MD  12/3/2017  11:41 AM     Electronically signed by Timothy Bunch MD at 12/3/2017 11:48 AM      Fadi Mathews MD at 12/3/2017  1:15 PM  Version 1 of 1           Dr. JUANA Mathews    [facility] INTENSIVE CARE    12/3/2017    Patient ID:  Name:  Angelita Brambila  MRN:  [id number]  1965  52 y.o.  female            CC/Reason for visit: Follow-up for acute stroke    HPI: The patient is feeling a little bit better.  She still has weakness on the left side.    Vitals:  Vitals:    12/03/17 0305 12/03/17 0505 12/03/17 0700 12/03/17 1100   BP: 105/69 122/81     BP Location:       Patient Position:        Pulse: 81 75     Resp:       Temp:   98.3 °F (36.8 °C) 98.2 °F (36.8 °C)   TempSrc:   Oral Oral   SpO2: (!) 89% (!) 89%     Weight:       Height:               Body mass index is 24.3 kg/(m^2).    Intake/Output Summary (Last 24 hours) at 12/03/17 1315  Last data filed at 12/03/17 1100   Gross per 24 hour   Intake              908 ml   Output              950 ml   Net              -42 ml       Exam:  GEN:  Awake, alert, following all commands.  She does have weakness in the left upper and left lower extremity which is somewhat better today than yesterday.  LUNGS: Clear breath sounds bilat, nonlabored breathing  CV:  Normal S1S2, without murmur, no edema  ABD:  Non tender, no enlarged liver or masses  EXT:  No cyanosis or clubbing    Scheduled meds:    atorvastatin 80 mg Oral Nightly   famotidine 20 mg Intravenous Q12H   rOPINIRole 1 mg Oral BID     IV meds:                        sodium chloride 75 mL/hr Last Rate: 75 mL/hr (12/02/17 1711)       Data Review:   I reviewed the patient's medications and new clinical results.  Lab Results   Component Value Date    CALCIUM 7.9 (L) 12/03/2017    MG 3.2 (H) 12/03/2017    MG 1.6 12/02/2017       Results from last 7 days  Lab Units 12/03/17  0521 12/02/17  1651 12/01/17  1537   SODIUM mmol/L 137  --  142   POTASSIUM mmol/L 3.9 3.2* 2.9*   CHLORIDE mmol/L 100  --  98   CO2 mmol/L 23.8  --  30.0*   BUN mg/dL 6  --  5*   CREATININE mg/dL 0.62  --  0.75   CALCIUM mg/dL 7.9*  --  8.9   BILIRUBIN mg/dL  --   --  0.8   ALK PHOS U/L  --   --  88   ALT (SGPT) U/L  --   --  24   AST (SGOT) U/L  --   --  26   GLUCOSE mg/dL 105*  --  106*   WBC 10*3/mm3  --   --  9.02   HEMOGLOBIN g/dL  --   --  16.4*   PLATELETS 10*3/mm3  --   --  300   INR   --   --  0.96               ASSESSMENT:   Right middle cerebral artery  CVA (cerebral vascular accident)  Hemorrhagic transformation of CVA  Left hemiparesis  Hypertension  Hypokalemia      PLAN:  Continue aspirin and statin.  Appreciate input  from neurology.    Continue rehabilitation efforts.  Transfer out of ICU today        Fadi Mathews MD  12/3/2017     Electronically signed by Fadi Mathews MD at 12/3/2017  1:18 PM        Medical Student Notes (last 7 days) (Notes from 2017 10:56 AM through 2017 10:56 AM)     No notes of this type exist for this encounter.           Consult Notes (last 7 days) (Notes from 17 through 17)      Timothy Bunch MD at 2017  4:33 PM  Version 1 of 1         NEUROLOGY CONSULTATION        PATIENT Angelita Brambila    1965   MRN [id number]   ADMIT DATE 2017   LENGTH OF STAY 0 days   ATTENDING Colton Leavitt MD       HISTORY OF PRESENT ILLNESS:  This is a 52-year-old woman about 2 and half hours prior to arrival (1 PM) down.  No serious injury noted the patient doesn't know why she fell.  She was to her local hospital and transferred to [facility].  She arrived here 2-1/2 hours after the onset of the event.  The ER noted an NIH of 3 because of mild left arm leg and face weakness.  Team D was called and I met her in the CT scan.  The patient agreed with the above history.  He was anxious but had no specific complaints.  She agreed to is some weakness of the left side of her body she wasn't aware of any other deficit.  She had no head or neck pain.  She denied any falls other than no one at presentation.  Not in history of TIA or stroke.    She does have a history of an MI in  with negative coronaries.  I wrote her cardiology note from a few months ago and long family history of a mother who  of an MI, brother  of an MI and a niece who has coronary disease and is already been stented.    CHIEF COMPLAINT: Neuro Deficit(s)      DIAGNOSIS: There are no admission diagnoses documented for this encounter.      PAST MEDICAL HISTORY:   Past Medical History:   Diagnosis Date   • Allergic rhinitis    • Ankle swelling    • Anxiety    • Arthritis    • Asthma    • Attention deficit  "hyperactivity disorder    • Back pain    • Chest pain    • Depression    • Esophageal reflux    • High cholesterol     Reported cholesterol level was high   • History of colonoscopy     Complete colonoscopy   • Hypercholesterolemia    • Hypertension    • IBS (irritable bowel syndrome)    • Insomnia    • Multiple benign polyps of large intestine    • Osteopenia    • Palpitations    • Rotator cuff tendonitis    • Seborrheic dermatitis    • Tendinitis, de Quervain's    • Tobacco use    • Vitamin B12 deficiency    • Vitamin D deficiency        PAST SURGICAL HISTORY:  Past Surgical History:   Procedure Laterality Date   • COLONOSCOPY     • DIAGNOSTIC LAPAROSCOPY EXPLORATORY LAPAROTOMY     • ELBOW ARTHROPLASTY Right     Elbow surgery   • HYSTERECTOMY  1999    Including both ovaries   • TONSILLECTOMY     • WRIST SURGERY Left        CURRENT MEDICATIONS:  Scheduled Medications:  alteplase 54.7 mg Intravenous Once   sodium chloride 100 mL/hr Intravenous Once     Infusions:    PRN Medications:  sodium chloride    SOCIAL HISTORY:  Social History     Social History   • Marital status:      Spouse name: N/A   • Number of children: N/A   • Years of education: N/A     Social History Main Topics   • Smoking status: Current Every Day Smoker     Packs/day: 1.50   • Smokeless tobacco: Never Used      Comment: caffeine use   • Alcohol use Yes      Comment: twice monthly   • Drug use: No   • Sexual activity: Not Asked     Other Topics Concern   • None     Social History Narrative       FAMILY HISTORY:   I have reviewed this patient's family history and it is not significant to the present illness.      REVIEW OF SYSTEMS: 14 system review performed and all other systems are negative except for Neuro Deficit(s)         PHYSICAL EXAM:  GENERAL: Reveals a man/woman who appears his/her stated age, in no acute distress.  VITAL SIGNS: BP (!) 146/7  Pulse 92  Temp 98.7 °F (37.1 °C) (Tympanic)   Resp 16  Ht 65\" (165.1 cm)  Wt 149 lb " 0.5 oz (67.6 kg)  SpO2 98%  BMI 24.8 kg/m2  NECK: Carotids are equal without bruits.   HEART: Regular rate and rhythm with no significant murmur or gallop.   EXTREMITIES: Nonedematous. Pulses are intact. There is no rash. There is no hepatosplenomegaly. No respiratory distress. There is no lymphadenopathy. There are no signs of cutaneous embolization.  NEUROLOGIC: Awake, alert and oriented x3. There is no aphasia.  There is a mild dysarthria  Patient can do simple calculations and remembers two out of three objects in five minutes. Visual field exam reveals a left homonymous hemianopsia.. Disks are flat. Cranial nerves II through XII are intact except for a moderate left upper motor neuron facial weakness.  Power is normal in the right arm and right leg.  Slight weakness of the left psoas but all other muscles in the left leg are normal.  There is a slight drift of the left leg but it does not go down to the bed.  There is a drift of the left arm but she keeps it off the bed.. Tone is normal. Reflexes are 2 and symmetric in the upper and lower extremities. Toes are downgoing. Sensations is severely impaired in the arm and left leg in that she cannot even feel firm pressure or movement of those limbs.  Sensations normal in the right arm or right leg.. Cerebellar function reveals no ataxia on finger to nose or heel to shin but the left arm is slightly clumsy compared to the right.  Gait was not tested.    NIH 10      DIAGNOSTIC DATA:   Labs reviewed and revealed no significant abnormalities.     Radiology images personally reviewed and and reviewed with Dr. Johnson.  There is a moderate sized acute-appearing infarct in the right parietal and temporal region.  There is a moderate right internal carotid artery stenosis possible component of thrombus though it could be atherosclerotic.  There is no occlusion/thrombus in the lumbar M2 segments of the middle cerebral artery on the right.  I cannot detect a more distal  occlusion.      IMPRESSION: It's pretty clear that the patient has suffered a right MCA distribution infarct.  As a moderate deficit as described above.  I Feel she meets criteria for IV TPA and after obtaining informed consent this was administered in the CT suite.  She will be admitted to the ICU and on the TPA protocol.  I will asked Dr. Monson to evaluate the right carotid stenosis but surely no acute intervention is required in this regard.  Will get an echo and MRI after the TPA protocol is finished at 24 hours.  Also start ASA and statin then.  Obviously should stop smoking.      Thank you for allowing me to see this patient.    Timothy Bunch MD  12/1/2017     (called stat to ER.   Delivered intensive care for 45 minutes)  4:33 PM     Electronically signed by Timothy Bunch MD at 12/1/2017  4:45 PM

## 2017-12-04 NOTE — PLAN OF CARE
Problem: Patient Care Overview (Adult)  Goal: Plan of Care Review    12/04/17 0915   Coping/Psychosocial Response Interventions   Plan Of Care Reviewed With patient   Patient Care Overview   Progress improving   Outcome Evaluation   Outcome Summary/Follow up Plan VFSS completed. Trace, silent, posterior aspiration with thins via straw and mixed mech soft. Mild-moderate oral residue with regular. SLP recs upgrade to thins (single drinks, no straws) and to continue no mixed mech soft. Will continue to follow for dysphagia tx, cog eval, and motor speech eval as indicated.          Problem: Inpatient SLP  Goal: Dysphagia- Patient will safely consume diet as per recommendation with no signs/symptoms of aspiration    12/04/17 0915   Safely Consume Diet   Safely Consume Diet- SLP, Outcome goal ongoing

## 2017-12-04 NOTE — NURSING NOTE
Spoke with mary ann, Letty. Working on dc plan but pt will have to stay alone throughout day as pt and other family members work full time. Dgt states she feels pt will need a longer stay to be able to come home independently. Interested in subacute.   CCP Annelise called and informed.     Shanon Sanches RN  Acute Rehab Admission Nurse

## 2017-12-04 NOTE — SIGNIFICANT NOTE
12/04/17 0907   Rehab Treatment   Discipline occupational therapist   Rehab Evaluation   Evaluation Not Performed (pt off flloor to VFSS per RN)   Recommendation   OT - Next Appointment 12/05/17

## 2017-12-04 NOTE — NURSING NOTE
Patient sitting up to bed after getting up with assist x 1 to BSC. Tolerated well. States headache has improved since receiving PRN dose of pain medication. Able to make all needs known. Continue to monitor.  Jyothi Junior RN

## 2017-12-04 NOTE — PROGRESS NOTES
"DOS: 2017  NAME: Angelita Brambila   : 1965  PCP: Ivonne Bradley MD  Chief Complaint   Patient presents with   • Neuro Deficit(s)     CC: Stroke    Subjective: No new events overnight per RN. Patient does c/o headache that is a \"8/10\" today, was a \"9/10\" yesterday. She currently being treated with lortab prn for HA. She denies any new stroke/TIA symptoms.     Objective:  Vital signs: /79  Pulse 82  Temp 98.2 °F (36.8 °C) (Oral)   Resp 19  Ht 65\" (165.1 cm)  Wt 146 lb (66.2 kg)  LMP Comment: hysterectomy 1994  SpO2 94%  BMI 24.3 kg/m2      Physical Exam:  GENERAL: NAD  HEENT: Normocephalic, atraumatic   COR: RRR  Resp: Even and unlabored  Extremities: No signs of distal embolization. Decreased ROM of right wrist secondary to pain, bruising.    Neurological:   MS: AO. Naming and repetition intact. Followed commands. Normal higher integrative function.  CN: II-XII normal except for left facial weakness and left HHH. mild dsyarthria  Motor: Normal strength on right. LUE with pronator drift strength on left at least 4-/5  Sensory: Decreased sensation of LUE  Coordination: Normal on right.     Results Review:     I reviewed the patient's new clinical results.    Current Medications:    amLODIPine 5 mg Oral Daily   atorvastatin 80 mg Oral Nightly   famotidine 20 mg Oral BID   rOPINIRole 1 mg Oral BID       sodium chloride 75 mL/hr Last Rate: 75 mL/hr (17)       Medications Reviewed    Laboratory results:  Lab Results   Component Value Date    GLUCOSE 93 2017    CALCIUM 7.9 (L) 2017     2017    K 3.5 2017    CO2 22.4 2017     2017    BUN 6 2017    CREATININE 0.57 2017    EGFRIFNONA 111 2017    BCR 10.5 2017    ANIONGAP 12.6 2017     Lab Results   Component Value Date    WBC 6.91 2017    HGB 12.3 2017    HCT 37.0 2017    .4 (H) 2017     2017        Results from last 7 " days  Lab Units 12/02/17  0631   CHOLESTEROL mg/dL 184     Lab Results   Component Value Date    INR 1.04 12/04/2017    INR 0.96 12/01/2017    PROTIME 13.2 12/04/2017    PROTIME 12.4 12/01/2017     No results found for: TSH  Lab Results   Component Value Date    LDLCALC 116 (H) 12/02/2017     Lab Results   Component Value Date    HGBA1C 4.81 12/02/2017     No components found for: B12    Review of imaging:  CTA h/n, CTP 12/1/17: IMPRESSION:  1.  The CT perfusion demonstrates abnormal perfusion involving the right  frontal lobe with a core area of abnormal CBF estimated to be 6 mL in  volume. There is delayed perfusion estimated to be 28 mL in volume  resulting in a mismatch ratio of 4.7. However, it should be noted that  this may be under represented as the CT examination demonstrates more  superior lateral areas of abnormal attenuation. There is a small linear  area of increased attenuation noted on the noncontrasted CT examination  suggesting thrombus overlying the right frontal lobe laterally.  2.  The CT angiogram demonstrates approximately 50% stenosis of the  proximal aspect of the internal carotid artery on the right secondary to  markedly irregular noncalcified plaque/thrombus. Intracranially there is  decreased opacification suggesting thrombus involving the small anterior  sylvian branches of the posterior division of the right middle cerebral  artery.  CT head 12/2/17: CT scan was performed through the brain without contrast and compared to  12/01/2017 and now demonstrates areas of hemorrhagic transformation in  the area of acute right MCA infarct. The largest area of hemorrhage is  in the right temporofrontal region and measures up to 1.2 x 1.5 cm.  Other adjacent areas are much less defined and consistent with petechial  hemorrhage. There is very slight mass effect with some effacement of the  cortical sulci, consistent with the infarct. There is also slight  midline shift to the left measuring 2 or 3  mm.  MRI brain 12/3/17: CONCLUSION: Large acute to subacute right MCA distribution infarct with  further areas of hemorrhagic transformation and slight worsening of  associated mass effect when compared to yesterday's CT scan.     Impression: Ms. Brambila is a 51 yo with CAD (MI in 2004), HLD and long history of smoking who transferred on 12/1 with left side weakness with a NIH of 10 and received  IV TPA. I discussed her imaging with Dr. Bunch which shows a RMCA stroke with hemorrhagic conversion. Per Dr. Bunch, radiologist didn't feel they could definitively separate atherosclerotic lesion from a thrombus. Which practical purposes at this point does not .  She is not a candidate for anticoagulation at this point due to her hemorrhagic conversion.  Her TTE was unremarkable and given the carotid stenosis on the left would assume that the source was artery to artery embolism. Plans are to hold antiplatelets for now and repeat a CTA head/neck on 3 weeks. Clinically she continues with headache which is slightly better today. Recommend avoiding narcotics for headache pain.. No further work-up necessary at this time. Continue aggressive control of risk factors including smoking cessation. Discussed this at length with patient. Continue Lipitor. PT/OT/ST. CCP for discharge planning/rehab needs. Please call with questions or concerns. Follow-up in stroke clinic pending results of CTA h/n as outpatient.     Plan:  Hold antiplatelets for now  CTA head/neck as outpatient in 3 weeks.   Lipitor 80 mg  Hydrate  Neurochecks  Non-pharmacological DVT prophylaxis  EKG Tele  PT/OT/ST  Stroke Education  Blood pressure control to <130/80  Goal LDL <70-recommend high dose statins-   Serum glucose < 140    Call [phone] for stroke/TIA symptoms  CTA head/neck in 3 weeks.   F/U in stroke clinic to be determined pending results of above, call [phone] for questions    I have discussed the above with the patient and  family.  Jennifer Serrato, APRN  12/04/17  12:33 PM      Active Problems:    CVA (cerebral vascular accident)

## 2017-12-04 NOTE — PROGRESS NOTES
Discharge Planning Assessment  [facility]     Patient Name: Angelita Brambila  MRN: [id number]  Today's Date: 12/4/2017    Admit Date: 12/1/2017          Discharge Needs Assessment       12/04/17 1520    Living Environment    Lives With alone    Living Arrangements house    Home Accessibility no concerns    Living Environment    Provides Primary Care For no one, unable/limited ability to care for self    Discharge Needs Assessment    Concerns To Be Addressed basic needs concerns    Anticipated Changes Related to Illness inability to care for self    Equipment Currently Used at Home none            Discharge Plan       12/04/17 1520    Case Management/Social Work Plan    Plan Plans are to subacute rehab.  Family and pt looking at options.     Additional Comments Spoke with pt and son @ bedside.  Confirmed facesheet and pharmacy info with pt.  Pt states she lives alone, no hx of DME/ HH.  Pt will need skilled rehab, would like to stay in the [place] area. Gave facility list and CCP contact info.  Pt will need precert, explained the procedure to pt and son, asked the make decision as soon as possible.  CCP will follow        Discharge Placement     No information found                Demographic Summary     None            Functional Status     None            Psychosocial     None            Abuse/Neglect     None            Legal     None            Substance Abuse     None            Patient Forms       12/04/17 1528    Patient Forms    Provider Choice List Delivered   RTR and TT's given as well    Delivered to Patient    Method of delivery In person          Rita Queen RN

## 2017-12-04 NOTE — PLAN OF CARE
Problem: Fall Risk (Adult)  Goal: Identify Related Risk Factors and Signs and Symptoms  Outcome: Outcome(s) achieved Date Met:  12/03/17  Goal: Absence of Falls  Outcome: Ongoing (interventions implemented as appropriate)    Problem: Stroke (Ischemic) (Adult)  Goal: Signs and Symptoms of Listed Potential Problems Will be Absent or Manageable (Stroke)  Outcome: Ongoing (interventions implemented as appropriate)    Problem: Patient Care Overview (Adult)  Goal: Plan of Care Review  Outcome: Ongoing (interventions implemented as appropriate)    12/03/17 1934   Coping/Psychosocial Response Interventions   Plan Of Care Reviewed With patient;daughter   Patient Care Overview   Progress improving   Outcome Evaluation   Outcome Summary/Follow up Plan Vital signs and neuro exam stable. Continued to c/o pain/sinus like pressure behind her eyes; tx with Lortab 5. MRI and 2D echo completed today. Ambulated in hallway with PT. PO intake remains low.  Coached pt to accept NTL to avoid dehydration and aspiration. Transferred out of ICU to [facility] toward end of shift.          Problem: Pressure Ulcer Risk (Maury Scale) (Adult,Obstetrics,Pediatric)  Goal: Identify Related Risk Factors and Signs and Symptoms  Outcome: Outcome(s) achieved Date Met:  12/03/17  Goal: Skin Integrity  Outcome: Ongoing (interventions implemented as appropriate)

## 2017-12-04 NOTE — PROGRESS NOTES
Dr. JUANA Mathews    [address]    12/4/2017    Patient ID:  Name:  Angelita Brambila  MRN:  [id number]  1965  52 y.o.  female            CC/Reason for visit: Follow-up for acute stroke    HPI: Patient is feeling better.  Her left-sided weakness is slowly improving.  She actually got up with physical therapy today.  Speech therapy has also upgraded her diet and advanced her diet.    Vitals:  Vitals:    12/04/17 0225 12/04/17 0527 12/04/17 1014 12/04/17 1316   BP: 116/72 130/88 131/79 145/95   BP Location: Right arm Right arm  Right arm   Patient Position: Lying Lying  Sitting   Pulse: 80 82  96   Resp: 18 19  18   Temp: 97.9 °F (36.6 °C) 98.2 °F (36.8 °C)  97.8 °F (36.6 °C)   TempSrc: Oral Oral  Oral   SpO2: 91% 94%  96%   Weight:       Height:               Body mass index is 24.3 kg/(m^2).    Intake/Output Summary (Last 24 hours) at 12/04/17 1624  Last data filed at 12/04/17 0618   Gross per 24 hour   Intake             1000 ml   Output                0 ml   Net             1000 ml       Exam:  GEN:  No distress, Awake, alert.  Still weak in the left upper and left lower extremities but much improvement  LUNGS: Clear breath sounds bilat, nonlabored breathing  CV:  Normal S1S2, without murmur, no edema  ABD:  Non tender, no enlarged liver or masses  EXT:  No cyanosis or clubbing    Scheduled meds:    amLODIPine 5 mg Oral Daily   atorvastatin 80 mg Oral Nightly   famotidine 20 mg Oral BID   rOPINIRole 1 mg Oral BID     IV meds:                        sodium chloride 75 mL/hr Last Rate: 75 mL/hr (12/04/17 0618)       Data Review:   I reviewed the patient's medications and new clinical results.  Lab Results   Component Value Date    CALCIUM 7.9 (L) 12/04/2017    PHOS 2.9 12/04/2017    MG 2.4 12/04/2017    MG 3.2 (H) 12/03/2017    MG 1.6 12/02/2017       Results from last 7 days  Lab Units 12/04/17  0609 12/04/17  0406 12/03/17  0521 12/02/17  1651 12/01/17  1537   SODIUM mmol/L  --  138 137  --   142   POTASSIUM mmol/L  --  3.5 3.9 3.2* 2.9*   CHLORIDE mmol/L  --  103 100  --  98   CO2 mmol/L  --  22.4 23.8  --  30.0*   BUN mg/dL  --  6 6  --  5*   CREATININE mg/dL  --  0.57 0.62  --  0.75   CALCIUM mg/dL  --  7.9* 7.9*  --  8.9   BILIRUBIN mg/dL  --  0.6  --   --  0.8   ALK PHOS U/L  --  70  --   --  88   ALT (SGPT) U/L  --  13  --   --  24   AST (SGOT) U/L  --  19  --   --  26   GLUCOSE mg/dL  --  93 105*  --  106*   WBC 10*3/mm3 6.91  --   --   --  9.02   HEMOGLOBIN g/dL 12.3  --   --   --  16.4*   PLATELETS 10*3/mm3 259  --   --   --  300   INR   --  1.04  --   --  0.96   PROBNP pg/mL  --  313.8  --   --   --            ASSESSMENT:   Active Problems:    CVA (cerebral vascular accident)  Oropharyngeal dysphagia  Left hemiparesis  Hypertension  Hypokalemia  Hemorrhagic transformation of CVA      PLAN:  Patient slowly improving.  Continue physical therapy anticipate discharge tomorrow to rehabilitation facility if she is appropriate.    The plan from a neurology standpoint is to avoid anticoagulation/antiplatelet therapy due to her hemorrhagic transformation.  They recommend repeat CT Mary Shafer of the head and neck in 3 weeks.  The patient continues to improve.  We will give her Fioricet for her headaches and avoid narcotics.  No further workup is necessary at this time.  Anticipating discharge tomorrow.        Fadi Mathews MD  12/4/2017

## 2017-12-05 ENCOUNTER — TELEPHONE (OUTPATIENT)
Dept: NEUROSURGERY | Facility: CLINIC | Age: 52
End: 2017-12-05

## 2017-12-05 ENCOUNTER — APPOINTMENT (OUTPATIENT)
Dept: CT IMAGING | Facility: HOSPITAL | Age: 52
End: 2017-12-05

## 2017-12-05 DIAGNOSIS — I65.23 CAROTID STENOSIS, BILATERAL: Primary | ICD-10-CM

## 2017-12-05 LAB
MAGNESIUM SERPL-MCNC: 2.2 MG/DL (ref 1.6–2.6)
POTASSIUM BLD-SCNC: 3.3 MMOL/L (ref 3.5–5.2)

## 2017-12-05 PROCEDURE — 93886 INTRACRANIAL COMPLETE STUDY: CPT

## 2017-12-05 PROCEDURE — 83735 ASSAY OF MAGNESIUM: CPT | Performed by: INTERNAL MEDICINE

## 2017-12-05 PROCEDURE — 70450 CT HEAD/BRAIN W/O DYE: CPT

## 2017-12-05 PROCEDURE — 97110 THERAPEUTIC EXERCISES: CPT

## 2017-12-05 PROCEDURE — 99232 SBSQ HOSP IP/OBS MODERATE 35: CPT | Performed by: NURSE PRACTITIONER

## 2017-12-05 PROCEDURE — 25010000002 LORAZEPAM PER 2 MG: Performed by: INTERNAL MEDICINE

## 2017-12-05 PROCEDURE — 92526 ORAL FUNCTION THERAPY: CPT

## 2017-12-05 PROCEDURE — 84132 ASSAY OF SERUM POTASSIUM: CPT | Performed by: INTERNAL MEDICINE

## 2017-12-05 PROCEDURE — 97165 OT EVAL LOW COMPLEX 30 MIN: CPT

## 2017-12-05 PROCEDURE — 97535 SELF CARE MNGMENT TRAINING: CPT

## 2017-12-05 RX ORDER — NICOTINE 21 MG/24HR
1 PATCH, TRANSDERMAL 24 HOURS TRANSDERMAL EVERY 24 HOURS
Status: DISCONTINUED | OUTPATIENT
Start: 2017-12-05 | End: 2017-12-13 | Stop reason: HOSPADM

## 2017-12-05 RX ORDER — QUETIAPINE FUMARATE 25 MG/1
25 TABLET, FILM COATED ORAL NIGHTLY
Status: DISCONTINUED | OUTPATIENT
Start: 2017-12-05 | End: 2017-12-06

## 2017-12-05 RX ORDER — THIAMINE MONONITRATE (VIT B1) 100 MG
200 TABLET ORAL DAILY
Status: DISCONTINUED | OUTPATIENT
Start: 2017-12-05 | End: 2017-12-13 | Stop reason: HOSPADM

## 2017-12-05 RX ORDER — AMLODIPINE BESYLATE 10 MG/1
10 TABLET ORAL DAILY
Status: DISCONTINUED | OUTPATIENT
Start: 2017-12-06 | End: 2017-12-13 | Stop reason: HOSPADM

## 2017-12-05 RX ORDER — QUETIAPINE FUMARATE 50 MG/1
50 TABLET, FILM COATED ORAL ONCE
Status: COMPLETED | OUTPATIENT
Start: 2017-12-05 | End: 2017-12-05

## 2017-12-05 RX ORDER — LORAZEPAM 2 MG/ML
1 INJECTION INTRAMUSCULAR
Status: DISCONTINUED | OUTPATIENT
Start: 2017-12-05 | End: 2017-12-06

## 2017-12-05 RX ORDER — LORAZEPAM 2 MG/ML
1 INJECTION INTRAMUSCULAR ONCE
Status: COMPLETED | OUTPATIENT
Start: 2017-12-05 | End: 2017-12-05

## 2017-12-05 RX ORDER — LORAZEPAM 2 MG/ML
0.5 INJECTION INTRAMUSCULAR EVERY 6 HOURS PRN
Status: DISCONTINUED | OUTPATIENT
Start: 2017-12-05 | End: 2017-12-05

## 2017-12-05 RX ORDER — CHLORDIAZEPOXIDE HYDROCHLORIDE 25 MG/1
50 CAPSULE, GELATIN COATED ORAL EVERY 8 HOURS SCHEDULED
Status: DISCONTINUED | OUTPATIENT
Start: 2017-12-05 | End: 2017-12-06

## 2017-12-05 RX ORDER — ASPIRIN 81 MG/1
81 TABLET, CHEWABLE ORAL DAILY
Status: DISCONTINUED | OUTPATIENT
Start: 2017-12-05 | End: 2017-12-13 | Stop reason: HOSPADM

## 2017-12-05 RX ORDER — FOLIC ACID 1 MG/1
1 TABLET ORAL DAILY
Status: DISCONTINUED | OUTPATIENT
Start: 2017-12-05 | End: 2017-12-13 | Stop reason: HOSPADM

## 2017-12-05 RX ADMIN — NICOTINE 1 PATCH: 21 PATCH, EXTENDED RELEASE TRANSDERMAL at 12:57

## 2017-12-05 RX ADMIN — AMLODIPINE BESYLATE 5 MG: 5 TABLET ORAL at 08:20

## 2017-12-05 RX ADMIN — LORAZEPAM 1 MG: 2 INJECTION, SOLUTION INTRAMUSCULAR; INTRAVENOUS at 13:43

## 2017-12-05 RX ADMIN — LORAZEPAM 0.5 MG: 2 INJECTION, SOLUTION INTRAMUSCULAR; INTRAVENOUS at 11:27

## 2017-12-05 RX ADMIN — FOLIC ACID 1 MG: 1 TABLET ORAL at 11:27

## 2017-12-05 RX ADMIN — LORAZEPAM 1 MG: 2 INJECTION, SOLUTION INTRAMUSCULAR; INTRAVENOUS at 16:25

## 2017-12-05 RX ADMIN — ASPIRIN 81 MG: 81 TABLET, CHEWABLE ORAL at 12:57

## 2017-12-05 RX ADMIN — FAMOTIDINE 20 MG: 20 TABLET, FILM COATED ORAL at 21:04

## 2017-12-05 RX ADMIN — Medication 1 TABLET: at 02:34

## 2017-12-05 RX ADMIN — LORAZEPAM 1 MG: 2 INJECTION, SOLUTION INTRAMUSCULAR; INTRAVENOUS at 21:25

## 2017-12-05 RX ADMIN — QUETIAPINE FUMARATE 25 MG: 25 TABLET, FILM COATED ORAL at 21:04

## 2017-12-05 RX ADMIN — QUETIAPINE FUMARATE 50 MG: 50 TABLET, FILM COATED ORAL at 13:43

## 2017-12-05 RX ADMIN — ATORVASTATIN CALCIUM 80 MG: 80 TABLET, FILM COATED ORAL at 21:04

## 2017-12-05 RX ADMIN — CHLORDIAZEPOXIDE HYDROCHLORIDE 50 MG: 25 CAPSULE ORAL at 23:31

## 2017-12-05 RX ADMIN — SODIUM CHLORIDE 75 ML/HR: 9 INJECTION, SOLUTION INTRAVENOUS at 08:40

## 2017-12-05 RX ADMIN — ROPINIROLE 1 MG: 1 TABLET, FILM COATED ORAL at 21:05

## 2017-12-05 RX ADMIN — QUETIAPINE FUMARATE 25 MG: 25 TABLET, FILM COATED ORAL at 07:42

## 2017-12-05 RX ADMIN — Medication 1 TABLET: at 21:04

## 2017-12-05 RX ADMIN — Medication 200 MG: at 11:27

## 2017-12-05 RX ADMIN — POTASSIUM CHLORIDE 20 MEQ: 1.5 POWDER, FOR SOLUTION ORAL at 23:30

## 2017-12-05 RX ADMIN — CHLORDIAZEPOXIDE HYDROCHLORIDE 50 MG: 25 CAPSULE ORAL at 16:40

## 2017-12-05 RX ADMIN — HYDROCODONE BITARTRATE AND ACETAMINOPHEN 1 TABLET: 5; 325 TABLET ORAL at 21:06

## 2017-12-05 RX ADMIN — ROPINIROLE 1 MG: 1 TABLET, FILM COATED ORAL at 08:21

## 2017-12-05 RX ADMIN — FAMOTIDINE 20 MG: 20 TABLET, FILM COATED ORAL at 08:21

## 2017-12-05 NOTE — PLAN OF CARE
Problem: Patient Care Overview (Adult)  Goal: Plan of Care Review  Outcome: Ongoing (interventions implemented as appropriate)    12/05/17 1823   Coping/Psychosocial Response Interventions   Plan Of Care Reviewed With daughter   Patient Care Overview   Progress progress toward functional goals as expected   Outcome Evaluation   Outcome Summary/Follow up Plan Unable to follow commands for safety. Orders received for vest restraint. Family aware and agreeable to restraint. No injuries noted.          Problem: SAFETY - NON-VIOLENT RESTRAINT  Goal: Remains free of injury from restraints (Non-Violent Restraint)  Outcome: Ongoing (interventions implemented as appropriate)  Goal: Free from restraint(s) (Non-Violent Restraint)  Outcome: Ongoing (interventions implemented as appropriate)

## 2017-12-05 NOTE — PROGRESS NOTES
Continued Stay Note  [facility]     Patient Name: Angelita Brambila  MRN: [id number]  Today's Date: 12/5/2017    Admit Date: 12/1/2017          Discharge Plan       12/05/17 1148    Case Management/Social Work Plan    Additional Comments Left message with [facility] [phone] to call to discuss discharge plans.  Need subacute options.               Discharge Codes     None            Annelise Mujica RN

## 2017-12-05 NOTE — THERAPY TREATMENT NOTE
"Acute Care - Speech Language Pathology   Swallow Treatment Note [facility]     Patient Name: Angelita Brambila  : 1965  MRN: [id number]  Today's Date: 2017  Onset of Illness/Injury or Date of Surgery Date: 17            Admit Date: 2017    Visit Dx:      ICD-10-CM ICD-9-CM   1. Cerebrovascular accident (CVA), unspecified mechanism I63.9 434.91   2. Hypokalemia E87.6 276.8   3. Hemiparesis affecting left side as late effect of cerebrovascular accident I69.354 438.20     Patient Active Problem List   Diagnosis   • Complete tear of right rotator cuff   • Precordial pain   • Dyspnea on exertion   • Palpitations   • Essential hypertension   • Hyperlipidemia   • Tobacco abuse   • CVA (cerebral vascular accident)             Adult Rehabilitation Note       17 1300 17 1104 17 1437    Rehab Assessment/Intervention    Discipline speech language pathologist  -AW physical therapist  -EE physical therapist  -EE    Document Type therapy note (daily note)  -AW therapy note (daily note)  -EE therapy note (daily note)  -EE    Subjective Information agree to therapy  -AW agree to therapy;no complaints  -EE agree to therapy;complains of;pain  -EE    Patient Effort, Rehab Treatment adequate  -AW good  -EE good  -EE    Symptoms Noted During/After Treatment other (see comments)  -AW none  -EE increased pain;other (see comments)   headache  -EE    Symptoms Noted Comment Nursing reported increased confusion. Numerous family members present reporting pt anxious and agitated. Pt stated, \"it's not a good time.\" Pt seen for VFSS f/u and diet tolerance. Will defer cognitive eval today.  -AW  Pt reported \"I think my headache is coming back\" following activity. RNJyothi, at bedside and aware.  -EE    Precautions/Limitations fall precautions;swallowing precautions  -AW fall precautions;other (see comments)   possible R wrist fx  -EE fall precautions;other (see comments)   possible R wrist fx; has not " been splinted yet  -EE    Specific Treatment Considerations   Potential R wrist fx that has not been splinted; assisted pt by holding R elbow/forearm and maintained NWB on R hand/wrist as a precaution  -EE    Recorded by [AW] Diane Nelson MS CCC-SLP [EE] Saritha Way PT [EE] Saritha Way PT    Pain Assessment    Pain Assessment  0-10  -EE 0-10  -EE    Pain Score  3  -EE 5  -EE    Pain Type  Chronic pain  -EE Acute pain  -EE    Pain Location  Back  -EE Head   also R wrist soreness from suspected fx  -EE    Pain Orientation  Lower  -EE     Pain Descriptors   Aching;Sore  -EE    Pain Intervention(s)  Repositioned;Ambulation/increased activity  -EE Repositioned;Rest  -EE    Response to Interventions  tolerated  -EE tolerated  -EE    Recorded by  [EE] Saritha Way PT [EE] Sartiha Way PT    Vision Assessment/Intervention    Visual Impairment  inattention to the left;left neglect;needs cues to attend visually  -EE inattention to the left;left neglect;needs cues to attend visually  -EE    Visual Impairment Comment   Demonstrates preference for R gaze, able to turn head to midline and to L with cueing  -EE    Recorded by  [EE] Saritha Way PT [EE] Saritha Way PT    Cognitive Assessment/Intervention    Current Cognitive/Communication Assessment impaired   pt appeared agitated, decreased insight, confused  -AW impaired  -EE functional  -EE    Orientation Status oriented to;person  -AW oriented to;person;place  -EE oriented x 4  -EE    Follows Commands/Answers Questions 100% of the time;able to follow single-step instructions  -% of the time;able to follow single-step instructions;needs cueing;needs increased time;needs repetition  -% of the time  -EE    Personal Safety decreased awareness, need for assist;decreased awareness, need for safety  -AW mild impairment;at risk behaviors demonstrated;decreased awareness, need for safety;decreased insight to deficits  -EE mild impairment;decreased insight to deficits  -EE     Personal Safety Interventions fall prevention program maintained  -AW fall prevention program maintained;gait belt;nonskid shoes/slippers when out of bed;supervised activity  -EE fall prevention program maintained;gait belt;muscle strengthening facilitated;nonskid shoes/slippers when out of bed;supervised activity  -EE    Recorded by [AW] Diane Nelson MS CCC-SLP [EE] Saritha Way, PT [EE] Saritha Way PT    Dysphagia/Swallow Intervention    Dysphagia/Swallow Intervention Reviewed results of VFSS with pt and family. Pt and family verbalized understanding of diet and strategies. Pt is tolerating [facility] soft diet, lungs clear.   -AW      Recorded by [AW] Diane Nelson MS CCC-SLP      Bed Mobility, Assessment/Treatment    Bed Mobility, Assistive Device   bed rails;head of bed elevated  -EE    Bed Mob, Supine to Sit, [place]  not tested  -EE contact guard assist;verbal cues required  -EE    Bed Mob, Sit to Supine, [place]  not tested  -EE supervision required  -EE    Bed Mobility, Impairments   strength decreased;pain;motor control impaired;coordination impaired  -EE    Bed Mobility, Comment  up in room with nsg staff, then up in chair  -EE     Recorded by  [EE] Saritha Way, PT [EE] Saritha Way PT    Transfer Assessment/Treatment    Transfers, Sit-Stand [place]  contact guard assist;verbal cues required  -EE contact guard assist;verbal cues required;nonverbal cues required (demo/gesture);hand held assist  -EE    Transfers, Stand-Sit [place]  contact guard assist;verbal cues required  -EE contact guard assist;verbal cues required;hand held assist  -EE    Transfer, Safety Issues  balance decreased during turns  -EE step length decreased;balance decreased during turns  -EE    Transfer, Impairments  strength decreased;impaired balance;coordination impaired  -EE strength decreased;impaired balance;coordination impaired;motor control impaired;pain  -EE    Transfer, Comment  cues to scan to L during turns to  avoid obstacles  -EE     Recorded by  [EE] Saritha Way PT [EE] Saritha Way PT    Gait Assessment/Treatment    Gait, [place] Level  contact guard assist;minimum assist (75% patient effort);verbal cues required  -EE contact guard assist;minimum assist (75% patient effort);verbal cues required;nonverbal cues required (demo/gesture);hand held assist  -EE    Gait, Distance (Feet)  400  -  -EE    Gait, Gait Deviations  ronnell decreased;narrow base;step length decreased;stride length decreased  -EE ronnell decreased;leans to the left;ataxia;narrow base;weight-shifting ability decreased;step length decreased  -EE    Gait, Safety Issues  step length decreased;balance decreased during turns  -EE step length decreased;weight-shifting ability decreased;balance decreased during turns  -EE    Gait, Impairments  strength decreased;impaired balance;impaired vision;coordination impaired  -EE strength decreased;impaired balance;coordination impaired;impaired vision;motor control impaired  -EE    Gait, Comment  Cueing required to scan to L and avoid obstacles on L side. Able to ambulate and maintain balance w/o assist at R UE today.   -EE Pt assisted by holding R elbow; avoided weightbearing on R hand/wrist. Verbal cues to scan to L and avoid obstacles on L sided during ambulation.  -EE    Recorded by  [EE] Saritha Way PT [EE] Saritha Way PT    Positioning and Restraints    Pre-Treatment Position  in bed  -EE in bed  -EE    Post Treatment Position  chair  -EE bed  -EE    In Bed   fowlers;call light within reach;encouraged to call for assist;exit alarm on;with family/caregiver;notified nsg;SCD pump applied;LUE elevated  -EE    In Chair  sitting;call light within reach;encouraged to call for assist;exit alarm on;notified nsg  -EE     Recorded by  [EE] Saritha Way PT [EE] Saritha Way PT      User Key  (r) = Recorded By, (t) = Taken By, (c) = Cosigned By    Initials Name Effective Dates    AUGUSTO Nelson MS CCC-SLP  04/13/15 -     EE Saritha Way, PT 12/01/15 -                   IP SLP Goals       12/04/17 0915 12/02/17 1446       Safely Consume Diet    Safely Consume Diet- SLP, Date Established  12/02/17  -CP     Safely Consume Diet- SLP, Time to Achieve  by discharge  -CP     Safely Consume Diet- SLP, Outcome goal ongoing  -SH        User Key  (r) = Recorded By, (t) = Taken By, (c) = Cosigned By    Initials Name Provider Type    MALIKA Park MS CCC-SLP Speech and Language Pathologist     Beronica Devi MS CCC-SLP Speech and Language Pathologist          EDUCATION  The patient has been educated in the following areas:   Dysphagia (Swallowing Impairment) Oral Care/Hydration.    SLP Recommendation and Plan                                           Plan of Care Review  Plan Of Care Reviewed With: patient  Progress: improving       SLP Outcome Measures (last 72 hours)      SLP Outcome Measures       12/04/17 0900 12/02/17 1400       SLP Outcome Measures    Outcome Measure Used? Adult NOMS  - Adult NOMS  -CP     FCM Scores    FCM Chosen Swallowing  -SH Swallowing  -CP     Swallowing FCM Score 5  - 4  -CP       User Key  (r) = Recorded By, (t) = Taken By, (c) = Cosigned By    Initials Name Effective Dates    CP Mei Park MS CCC-SLP 04/13/15 -      Beronica Devi MS CCC-SLP 07/13/17 -              Time Calculation:         Time Calculation- SLP       12/05/17 1322          Time Calculation- SLP    SLP Start Time 1300  -AW      SLP Stop Time 1315  -AW      SLP Time Calculation (min) 15 min  -AW      SLP Received On 12/05/17  -AW        User Key  (r) = Recorded By, (t) = Taken By, (c) = Cosigned By    Initials Name Provider Type    AW Diane Nelson MS CCC-SLP Speech and Language Pathologist          Therapy Charges for Today     Code Description Service Date Service Provider Modifiers Qty    01090174693 HC ST TREATMENT SWALLOW 1 12/5/2017 Diane Nelson MS CCC-SLP GN 1                 Diane Nelson MS CCC-MASSIEL  12/5/2017

## 2017-12-05 NOTE — TELEPHONE ENCOUNTER
----- Message from RUMA Joseph sent at 12/4/2017  4:27 PM EST -----  I know he's very busy, but Dr Monson wants to see her in the office when he returns from vacay. She's in the hospital admitted for stroke and carotid stenosis, likely will need surgery. He wants B carotid dopplar prior to appt. thanks!

## 2017-12-05 NOTE — PROGRESS NOTES
Dr. JUANA Mathews    [address]    12/5/2017    Patient ID:  Name:  Angelita Brambila  MRN:  [id number]  1965  52 y.o.  female            CC/Reason for visit: Follow-up for acute stroke    HPI: Patient obviously drinks alcohol every night and now is going through withdrawals.  She is agitated, restless, confused, having high blood pressure and tachycardia.  We have tried some Ativan and Seroquel but the patient is still restless.    Vitals:  Vitals:    12/05/17 0536 12/05/17 0820 12/05/17 0958 12/05/17 1259   BP: 121/73 120/88 129/80 139/90   BP Location: Left arm  Left arm Left arm   Patient Position: Lying  Lying    Pulse: 62 112 93 116   Resp: 18  20    Temp: 98.4 °F (36.9 °C)  98.3 °F (36.8 °C)    TempSrc: Oral  Oral    SpO2: 98%  97%    Weight:       Height:               Body mass index is 24.3 kg/(m^2).  No intake or output data in the 24 hours ending 12/05/17 1551    Exam:  GEN:  Arousable, restless, picking and pulling at lines and interfering with her care.  She was trying earlier to stand up and get out of bed.  LUNGS: Clear breath sounds bilat, nonlabored breathing  CV:  Normal S1S2, without murmur, no edema  ABD:  Non tender, no enlarged liver or masses  EXT:  No cyanosis or clubbing    Scheduled meds:    [START ON 12/6/2017] amLODIPine 10 mg Oral Daily   aspirin 81 mg Oral Daily   atorvastatin 80 mg Oral Nightly   chlordiazePOXIDE 50 mg Oral Q8H   famotidine 20 mg Oral BID   folic acid 1 mg Oral Daily   nicotine 1 patch Transdermal Q24H   QUEtiapine 25 mg Oral Nightly   rOPINIRole 1 mg Oral BID   thiamine 200 mg Oral Daily     IV meds:                        sodium chloride 75 mL/hr Last Rate: 75 mL/hr (12/05/17 0840)       Data Review:   I reviewed the patient's medications and new clinical results.  Lab Results   Component Value Date    CALCIUM 7.9 (L) 12/04/2017    PHOS 2.9 12/04/2017    MG 2.4 12/04/2017    MG 3.2 (H) 12/03/2017    MG 1.6 12/02/2017       Results from last 7  days  Lab Units 12/04/17  0609 12/04/17  0406 12/03/17  0521 12/02/17  1651 12/01/17  1537   SODIUM mmol/L  --  138 137  --  142   POTASSIUM mmol/L  --  3.5 3.9 3.2* 2.9*   CHLORIDE mmol/L  --  103 100  --  98   CO2 mmol/L  --  22.4 23.8  --  30.0*   BUN mg/dL  --  6 6  --  5*   CREATININE mg/dL  --  0.57 0.62  --  0.75   CALCIUM mg/dL  --  7.9* 7.9*  --  8.9   BILIRUBIN mg/dL  --  0.6  --   --  0.8   ALK PHOS U/L  --  70  --   --  88   ALT (SGPT) U/L  --  13  --   --  24   AST (SGOT) U/L  --  19  --   --  26   GLUCOSE mg/dL  --  93 105*  --  106*   WBC 10*3/mm3 6.91  --   --   --  9.02   HEMOGLOBIN g/dL 12.3  --   --   --  16.4*   PLATELETS 10*3/mm3 259  --   --   --  300   INR   --  1.04  --   --  0.96   PROBNP pg/mL  --  313.8  --   --   --              ASSESSMENT:   Acute alcohol withdrawal, new problem  CVA (cerebral vascular accident)  Oropharyngeal dysphagia  Left hemiparesis  Hypertension  Hypokalemia  Hemorrhagic transformation of CVA        PLAN:  This patient is obviously worse now.  Family admits to her drinking 2 drinks of alcohol every night.  She is most likely going through withdrawal.  Neurology is aware.  She is now undergoing EEG testing.  I'm going to start benzodiazepines in order to control her withdrawal symptoms, and hopefully prevent withdrawal seizures.  She already has a hemorrhagic transformation of the stroke, and now is having these symptoms suggestive of alcohol withdrawal.  She isn't very high risk of complications.  We may have to transfer her back to the intensive care unit tonight if she does not respond to the intravenous Ativan.    Extensive amount of data was reviewed.  Images were directly visualized by me.  This patient warrants high complexity medical decision making.    Fadi Mathews MD  12/5/2017

## 2017-12-05 NOTE — PLAN OF CARE
Problem: Patient Care Overview (Adult)  Goal: Plan of Care Review    12/05/17 1117   Coping/Psychosocial Response Interventions   Plan Of Care Reviewed With patient   Patient Care Overview   Progress progress towards functional goals is fair   Outcome Evaluation   Outcome Summary/Follow up Plan Pt able to maintain gait distance, demonstrating improved balance as evidenced by ability to ambulate w/o assist at R UE. Continues to require close assist and repeated cues to attend to L side. Also more confused today and requiring redirection during ambulation. Will continue to increase independence as able.

## 2017-12-05 NOTE — THERAPY EVALUATION
Acute Care - Occupational Therapy Initial Evaluation  [facility]     Patient Name: Angelita Brambila  : 1965  MRN: [id number]  Today's Date: 2017  Onset of Illness/Injury or Date of Surgery Date: 17     Referring Physician: Rajendra    Admit Date: 2017       ICD-10-CM ICD-9-CM   1. Cerebrovascular accident (CVA), unspecified mechanism I63.9 434.91   2. Hypokalemia E87.6 276.8   3. Hemiparesis affecting left side as late effect of cerebrovascular accident I69.354 438.20     Patient Active Problem List   Diagnosis   • Complete tear of right rotator cuff   • Precordial pain   • Dyspnea on exertion   • Palpitations   • Essential hypertension   • Hyperlipidemia   • Tobacco abuse   • CVA (cerebral vascular accident)     Past Medical History:   Diagnosis Date   • Allergic rhinitis    • Ankle swelling    • Anxiety    • Arthritis    • Asthma    • Attention deficit hyperactivity disorder    • Back pain    • Chest pain    • CHF (congestive heart failure)    • COPD (chronic obstructive pulmonary disease)    • Coronary artery disease    • Depression    • Esophageal reflux    • High cholesterol     Reported cholesterol level was high   • History of colonoscopy     Complete colonoscopy   • Hypercholesterolemia    • Hypertension    • IBS (irritable bowel syndrome)    • Insomnia    • Osteopenia    • Palpitations    • Rotator cuff tendonitis    • Seborrheic dermatitis    • Stroke    • Tendinitis, de Quervain's    • Tobacco use    • Vitamin B12 deficiency    • Vitamin D deficiency      Past Surgical History:   Procedure Laterality Date   • COLONOSCOPY     • DIAGNOSTIC LAPAROSCOPY EXPLORATORY LAPAROTOMY     • ELBOW ARTHROPLASTY Right     Elbow surgery   • FRACTURE SURGERY     • HYSTERECTOMY      Including both ovaries   • TONSILLECTOMY     • WRIST SURGERY Left           OT ASSESSMENT FLOWSHEET (last 72 hours)      OT Evaluation       17 1226 17 1200 17 1125 17 1104 17 0814     "Rehab Evaluation    Document Type  evaluation  -  therapy note (daily note)  -EE     Subjective Information  agree to therapy;no complaints  -HC  agree to therapy;no complaints  -EE     Patient Effort, Rehab Treatment  good  -HC  good  -EE     Symptoms Noted During/After Treatment  none  -HC  none  -EE     Symptoms Noted Comment  Pt confused and reporting \"We are not in [place]. We are not in a hospital. Grab my cigarrettes in my bedside in my room.\"  -       General Information    Patient Profile Review  yes  -HC       General Observations  pt supine in bed in no acute distress  -HC       Pertinent History Of Current Problem  acute R MCA CVA, R wrist pain from fall  -       Precautions/Limitations  fall precautions  -HC  fall precautions;other (see comments)   possible R wrist fx  -EE     Prior Level of Function  independent:  -HC       Equipment Currently Used at Home  none  -HC       Plans/Goals Discussed With  patient  -HC       Risks Reviewed  patient:  -HC       Benefits Reviewed  patient:  -HC       Living Environment    Lives With  alone  -       Living Arrangements  house  -       Home Accessibility  no concerns  -       Living Environment Comment  pt lives in a house on [facility]  -       Clinical Impression    Impairments Found (describe specific impairments)  aerobic capacity/endurance;arousal, attention, and cognition;ergonomics and body mechanics;gait, locomotion, and balance;motor function;muscle performance;neuromotor development and sensory integration;ROM  -HC       Patient/Family Goals Statement  to return to [facility]  -       Criteria for Skilled Therapeutic Interventions Met  yes  -HC       Rehab Potential  good, to achieve stated therapy goals  -       Therapy Frequency  3-5 times/wk  -       Anticipated Discharge Disposition inpatient rehabilitation facility  - inpatient rehabilitation facility  -       Pain Assessment    Pain Assessment  No/denies pain  -  0-10  " -EE     Pain Score    3  -EE     Pain Type    Chronic pain  -EE     Pain Location    Back  -EE     Pain Orientation    Lower  -EE     Pain Intervention(s)    Repositioned;Ambulation/increased activity  -EE     Response to Interventions    tolerated  -EE     Vision Assessment/Intervention    Visual Impairment  inattention to the left;left neglect;needs cues to attend visually  -HC  inattention to the left;left neglect;needs cues to attend visually  -EE     Visual Impairment Comment  strong R gaze preference, able to turn head midline with cueing  -       Visual Acuity Comment  0  -HC       Visual Scanning  mild impairment, scan to left  -HC       Cognitive Assessment/Intervention    Current Cognitive/Communication Assessment  impaired  -HC  impaired  -EE     Orientation Status  oriented to;person;time;situation  -  oriented to;person;place  -EE     Follows Commands/Answers Questions  100% of the time;able to follow single-step instructions;needs cueing;needs increased time;needs repetition  -HC  100% of the time;able to follow single-step instructions;needs cueing;needs increased time;needs repetition  -EE     Personal Safety  mild impairment;at risk behaviors demonstrated;decreased awareness, need for assist;decreased awareness, need for safety;decreased insight to deficits  -HC  mild impairment;at risk behaviors demonstrated;decreased awareness, need for safety;decreased insight to deficits  -EE     Personal Safety Interventions  fall prevention program maintained;gait belt;nonskid shoes/slippers when out of bed  -  fall prevention program maintained;gait belt;nonskid shoes/slippers when out of bed;supervised activity  -EE     ROM (Range of Motion)    General ROM Detail  STUART WALDEN ataxic  -HC       MMT (Manual Muscle Testing)    General MMT Assessment Detail  STUART PERSON 3-/5  -HC       Muscle Tone Assessment    Muscle Tone Assessment  Left-Side Extremities  -HC       Left-Side Extremities Muscle Tone  "Assessment  mildly decreased tone  -HC mildly decreased tone  -TF  mildly decreased tone  -TF    Bed Mobility, Assessment/Treatment    Bed Mobility, Assistive Device  bed rails;head of bed elevated  -HC       Bed Mob, Supine to Sit, [place]  contact guard assist;verbal cues required  -HC  not tested  -EE     Bed Mob, Sit to Supine, [place]  not tested  -HC  not tested  -EE     Bed Mobility, Comment    up in room with nsg staff, then up in chair  -EE     Transfer Assessment/Treatment    Transfers, Bed-Chair [place]  contact guard assist;minimum assist (75% patient effort);verbal cues required;set up required  -HC       Transfers, Chair-Bed [place]  not tested  -HC       Transfers, Sit-Stand [place]  contact guard assist;verbal cues required  -HC  contact guard assist;verbal cues required  -EE     Transfers, Stand-Sit [place]  contact guard assist;verbal cues required  -HC  contact guard assist;verbal cues required  -EE     Transfer, Safety Issues    balance decreased during turns  -EE     Transfer, Impairments    strength decreased;impaired balance;coordination impaired  -EE     Transfer, Comment  VCs for hand placement and to scan to L to find bedside chair. While sitting EOB, pt implusivly attempts to stand w/o assist saying she needs to \"get into my closet and get my brown blanket out.\" Pt enouragement to sit back down and redirected and oriented to place.  -HC  cues to scan to L during turns to avoid obstacles  -EE     Lower Body Dressing Assessment/Training    LB Dressing Assess/Train, Clothing Type  doffing:;donning:;socks  -       LB Dressing Assess/Train, Position  sitting;edge of bed  -       LB Dressing Assess/Train, Emmet  maximum assist (25% patient effort)  -HC       LB Dressing Assess/Train, Impairments  muscle tone abnormal;ROM decreased;strength decreased;coordination impaired;motor control impaired  -HC       Grooming Assessment/Training    Grooming " Assess/Train, Position  sitting  -       Grooming Assess/Train, Indepen Level  minimum assist (75% patient effort);hand over hand;verbal cues required;set up required  -       Grooming Assess/Train, Impairments  muscle tone abnormal;strength decreased;coordination impaired;motor control impaired  -       Grooming Assess/Train, Comment  Participated in oral hygiene. Items placed on L side of table and pt cued to scan L to find with min difficulty. Pt with difficulty using LUE during function task including fine and gross motor coordination.  -       Motor Skills/Interventions    Additional Documentation  Fine Motor Coordination Training (Group);Gross Motor Coordination Training (Group);Functional Endurance (Group);Neuromuscular Re-education (Group)  -       Balance Skills Training    Sitting-Level of Assistance  Close supervision  -       Sitting-Balance Support  Left upper extremity supported;Right upper extremity supported  -       Standing-Level of Assistance  Contact guard;Minimum assistance  -       Static Standing Balance Support  Right upper extremity supported  -       Gross Motor Coordination Training    Gross Motor Skill, Impairments Detail  LUE ataxic, functional grasp and release, forward reach impaired  -       Fine Motor Coordination Training    Detail (Fine Motor Coordination Training)  FMC impaired due to motor planning and coordination deficits  -       Opposition  Left:;impaired  -       Functional Endurance    Detail (Functional Endurance)  fair  -       Neuromuscular Re-education    Neuromuscular Re-Ed Techniques  Fine motor task- grasp/release;Gross motor task- grasp/release;Attention to left side during functional activities  -       General Therapy Interventions    Planned Therapy Interventions  activity intolerance;ADL retraining;IADL retraining;balance training;adaptive equipment training;bed mobility training;transfer training;stretching;strengthening;ROM (Range  "of Motion)  -HC       Positioning and Restraints    Pre-Treatment Position  in bed  -HC  in bed  -EE     Post Treatment Position  chair  -HC  chair  -EE     In Chair  sitting;call light within reach;encouraged to call for assist;exit alarm on;legs elevated  -HC  sitting;call light within reach;encouraged to call for assist;exit alarm on;notified nsg  -EE       12/04/17 2104 12/04/17 1520 12/04/17 1437 12/04/17 0907 12/04/17 0800    Rehab Evaluation    Document Type   therapy note (daily note)  -EE  evaluation  -SH    Subjective Information   agree to therapy;complains of;pain  -EE  agree to therapy  -SH    Evaluation Not Performed    --   pt off flloor to VFSS per RN  -LE     Patient Effort, Rehab Treatment   good  -EE  good  -SH    Symptoms Noted During/After Treatment   increased pain;other (see comments)   headache  -EE      Symptoms Noted Comment   Pt reported \"I think my headache is coming back\" following activity. Jyothi PABLO, at bedside and aware.  -EE      General Information    Precautions/Limitations   fall precautions;other (see comments)   possible R wrist fx; has not been splinted yet  -EE      Equipment Currently Used at Home  none  -KK       Living Environment    Lives With  alone  -KK       Living Arrangements  house  -KK       Home Accessibility  no concerns  -       Pain Assessment    Pain Assessment   0-10  -EE  No/denies pain  -    Pain Score   5  -EE      Pain Type   Acute pain  -EE      Pain Location   Head   also R wrist soreness from suspected fx  -EE      Pain Descriptors   Aching;Sore  -EE      Pain Intervention(s)   Repositioned;Rest  -EE      Response to Interventions   tolerated  -EE      Vision Assessment/Intervention    Visual Impairment   inattention to the left;left neglect;needs cues to attend visually  -EE      Visual Impairment Comment   Demonstrates preference for R gaze, able to turn head to midline and to L with cueing  -EE      Cognitive Assessment/Intervention    Current " Cognitive/Communication Assessment   functional  -EE      Orientation Status   oriented x 4  -EE      Follows Commands/Answers Questions   100% of the time  -EE      Personal Safety   mild impairment;decreased insight to deficits  -EE      Personal Safety Interventions   fall prevention program maintained;gait belt;muscle strengthening facilitated;nonskid shoes/slippers when out of bed;supervised activity  -EE      Muscle Tone Assessment    Muscle Tone Assessment Left-Side Extremities  -TH        Left-Side Extremities Muscle Tone Assessment mildly decreased tone  -TH        Bed Mobility, Assessment/Treatment    Bed Mobility, Assistive Device   bed rails;head of bed elevated  -EE      Bed Mob, Supine to Sit, [place]   contact guard assist;verbal cues required  -EE      Bed Mob, Sit to Supine, [place]   supervision required  -EE      Bed Mobility, Impairments   strength decreased;pain;motor control impaired;coordination impaired  -EE      Transfer Assessment/Treatment    Transfers, Sit-Stand [place]   contact guard assist;verbal cues required;nonverbal cues required (demo/gesture);hand held assist  -EE      Transfers, Stand-Sit [place]   contact guard assist;verbal cues required;hand held assist  -EE      Transfer, Safety Issues   step length decreased;balance decreased during turns  -EE      Transfer, Impairments   strength decreased;impaired balance;coordination impaired;motor control impaired;pain  -EE      Positioning and Restraints    Pre-Treatment Position   in bed  -EE      Post Treatment Position   bed  -EE      In Bed   fowlers;call light within reach;encouraged to call for assist;exit alarm on;with family/caregiver;notified nsg;SCD pump applied;STUART elevated  -EE        12/04/17 0758 12/04/17 0010 12/03/17 1939 12/03/17 1720 12/03/17 1600    Living Environment    Lives With   alone  -AC      Living Arrangements   house  -AC      Home Accessibility   other (see comments)   basement stairs   -AC      Stair Railings at Home   other (see comments)   unsure  -AC      Type of Financial/Environmental Concern   none  -AC      Transportation Available   other (see comments)   not sure at the moment  -AC      Muscle Tone Assessment    Muscle Tone Assessment Left-Side Extremities  -AT   Left-Side Extremities  -TB     Left-Side Extremities Muscle Tone Assessment mildly decreased tone  -AT mildly decreased tone  -AC mildly decreased tone  -AC mildly decreased tone  -TB     Sensory Assessment/Intervention    LUE Light Touch   WNL  -AC  severe impairment  -EVELINE    LUE Sharp/Dull Discrimination   mild impairment  -AC  severe impairment  -EVELINE    LUE Deep Pressure   WNL  -AC        12/03/17 1205 12/03/17 1117 12/03/17 1025 12/03/17 0745 12/03/17 0000    Rehab Evaluation    Document Type  evaluation  -PC       Subjective Information  agree to therapy;complains of;pain  -PC       Evaluation Not Performed   patient unavailable for evaluation   Patient off floor for MRI- will check back tomorrow  -KH      Patient Effort, Rehab Treatment  good  -PC       Symptoms Noted Comment  headache, R wrist pain  -PC       General Information    Patient Profile Review  yes  -PC       Onset of Illness/Injury or Date of Surgery Date  12/01/17  -PC       Referring Physician  Sayied  -PC       General Observations  pt is in bed, no acute distress, bruising present R wrist  -PC       Pertinent History Of Current Problem  acute R MCA CVA, received TPA, had hemorrhagic transformation, also with suspected R wrist fracture from fall  -PC       Precautions/Limitations  fall precautions  -PC       Prior Level of Function  independent:  -PC       Living Environment    Lives With  alone  -PC       Living Environment Comment  pt lives in a house on [facility]  -PC       Pain Assessment    Pain Assessment  0-10  -PC       Pain Score  5  -PC       Pain Location  Head   pain also R wrist from fall, suspected fx  -PC       Vision  Assessment/Intervention    Visual Impairment  left neglect;inattention to the left;needs cues to attend visually  -PC       Cognitive Assessment/Intervention    Current Cognitive/Communication Assessment  functional  -PC       Orientation Status  oriented x 4  -PC       Follows Commands/Answers Questions  100% of the time  -PC       Personal Safety  decreased insight to deficits;mild impairment  -       Personal Safety Interventions  fall prevention program maintained;gait belt  -PC       ROM (Range of Motion)    General ROM  no range of motion deficits identified  -       MMT (Manual Muscle Testing)    General MMT Assessment Detail  R side WNL, L UE 3/4, RLE 4-/5  -PC       Bed Mobility, Assessment/Treatment    Bed Mob, Supine to Sit, [place]  minimum assist (75% patient effort)  -PC       Transfer Assessment/Treatment    Transfers, Sit-Stand [place]  minimum assist (75% patient effort)  -PC       Transfers, Stand-Sit [place]  minimum assist (75% patient effort)  -       Motor Skills/Interventions    Motor Response Observations  --   dec coordination L side, dec motor planning  -       Additional Documentation  Balance Skills Training (Group)  -       Balance Skills Training    Sitting-Level of Assistance  Close supervision  -       Sitting-Balance Support  Left upper extremity supported;Right upper extremity supported  -       Sitting-Balance Activities  Trunk control activities  -       Sitting # of Minutes  4  -PC       Standing-Level of Assistance  Minimum assistance;x2  -PC       Static Standing Balance Support  Left upper extremity supported;Right upper extremity supported  -PC       Gait Balance-Level of Assistance  Moderate assistance;x2  -PC       Gait Balance Support  Right upper extremity supported;Left upper extremity supported  -       Sensory Assessment/Intervention    LUE Light Touch severe impairment  -EVELINE   severe impairment  -EVELINE severe impairment  -JT    STUART  Sharp/Dull Discrimination severe impairment  -EVELINE   severe impairment  -EVELINE     Positioning and Restraints    Pre-Treatment Position  in bed  -PC       Post Treatment Position  bsc  -PC       On BS commode  sitting;call light within reach;encouraged to call for assist;with family/caregiver;with nsg  -PC         12/02/17 2000 12/02/17 1857 12/02/17 1800 12/02/17 1700 12/02/17 1602    Sensory Assessment/Intervention    Light Touch  LUE  -SG LUE  -SG LUE  -SG LUE  -SG    LUE Light Touch severe impairment  -JT severe impairment  -SG severe impairment  -SG severe impairment  -SG severe impairment  -SG    Sharp/Dull Discrimination  LUE  -SG LUE  -SG LUE  -SG LUE  -SG    LUE Sharp/Dull Discrimination  moderate impairment  -SG moderate impairment  -SG moderate impairment  -SG moderate impairment  -SG    Deep Pressure  LUE  -SG LUE  -SG LUE  -SG LUE  -SG    LUE Deep Pressure  mild impairment  -SG mild impairment  -SG mild impairment  -SG mild impairment  -SG      12/02/17 1505 12/02/17 1403 12/02/17 1400 12/02/17 1300       Rehab Evaluation    Document Type   evaluation  -CP      Subjective Information   no complaints  -CP      Cognitive Assessment/Intervention    Current Cognitive/Communication Assessment   --   L neglect  -CP      Sensory Assessment/Intervention    Light Touch LUE  -SG LUE  -SG  LUE  -SG     LUE Light Touch severe impairment  -SG severe impairment  -SG  severe impairment  -SG     Sharp/Dull Discrimination LUE  -SG LUE  -SG  LUE  -SG     LUE Sharp/Dull Discrimination moderate impairment  -SG moderate impairment  -SG  moderate impairment  -SG     Deep Pressure LUE  -SG LUE  -SG  LUE  -SG     LUE Deep Pressure mild impairment  -SG mild impairment  -SG  mild impairment  -SG       User Key  (r) = Recorded By, (t) = Taken By, (c) = Cosigned By    Initials Name Effective Dates    RAYMOND Valentin, OTR 04/13/15 -     CP Mei Park, MS CCC-SLP 04/13/15 -     PC Haily Champion, PT 12/01/15 -     EVELINE Nataliia Maya,  RN 06/16/16 -     EE Saritha Way, PT 12/01/15 -     TF Ct Ricketts, RN 06/16/16 -     TH Mery Branham, RN 06/16/16 -     AC Porsche Artis, RN 04/17/17 -     SH Beronica Devi, MS CCC-SLP 07/13/17 -     SG Beronica Nieves, RN 06/16/16 -     KK Rita Queen, RN 02/18/16 -     JT Vicente Kessler, RN 06/16/16 -     KH Gabriela Martinez, PT 06/22/16 -     TB Ray Cutler, RN 04/28/17 -     HC Vida Mcgarry, OTR 08/17/17 -     AT Jyothi Junior, RN 10/30/17 -            Occupational Therapy Education     Title: PT OT SLP Therapies (Active)     Topic: Occupational Therapy (Active)     Point: ADL training (Active)    Description: Instruct learner(s) on proper safety adaptation and remediation techniques during self care or transfers.   Instruct in proper use of assistive devices.    Learning Progress Summary    Learner Readiness Method Response Comment Documented by Status   Patient Eager E NR   12/05/17 1222 Active               Point: Precautions (Active)    Description: Instruct learner(s) on prescribed precautions during self-care and functional transfers.    Learning Progress Summary    Learner Readiness Method Response Comment Documented by Status   Patient Eager E NR   12/05/17 1222 Active               Point: Body mechanics (Active)    Description: Instruct learner(s) on proper positioning and spine alignment during self-care, functional mobility activities and/or exercises.    Learning Progress Summary    Learner Readiness Method Response Comment Documented by Status   Patient Eager E NR   12/05/17 1222 Active                      User Key     Initials Effective Dates Name Provider Type Discipline     08/17/17 -  Vida Mcgarry OTR Occupational Therapist OT                  OT Recommendation and Plan  Anticipated Discharge Disposition: inpatient rehabilitation facility  Planned Therapy Interventions: activity intolerance, ADL retraining, IADL retraining, balance training, adaptive equipment training, bed  mobility training, transfer training, stretching, strengthening, ROM (Range of Motion)  Therapy Frequency: 3-5 times/wk  Plan of Care Review  Plan Of Care Reviewed With: patient  Outcome Summary/Follow up Plan: Pt presented to OT eval alert, confused, and oriented x2. Pt presents with functional deficits in mobility, transfers, ADLs, FMC and GMC, balance, and L side neglect. Pt will benefit from continued skilled OT services while inpatient. Recommending IRF at d/c. Will continue to follow.          OT Goals       12/05/17 1223          Transfer Training OT LTG    Transfer Training OT LTG, Date Established 12/05/17  -HC      Transfer Training OT LTG, Time to Achieve 1 wk  -HC      Transfer Training OT LTG, Activity Type bed to chair /chair to bed;toilet;sit to stand/stand to sit  -HC      Transfer Training OT LTG, [place] Level supervision required  -HC      Coordination OT LTG    Coordination OT LTG, Date Established 12/05/17  -HC      Coordination OT LTG, Time to Achieve 1 wk  -HC      Coordination OT LTG, Activity Type FM task;GM task  -HC      Coordination OT LTG, [place] Level min assist;with verbal cues;setup  -HC      ADL OT LTG    ADL OT LTG, Date Established 12/05/17  -HC      ADL OT LTG, Time to Achieve 1 wk  -HC      ADL OT LTG, Activity Type ADL skills  -HC      ADL OT LTG, [place] Level min assist;contact guard  -HC      Endurance OT LTG    Endurance Goal OT LTG, Date Established 12/05/17  -HC      Endurance Goal OT LTG, Time to Achieve 1 wk  -HC      Endurance Goal OT LTG, Activity Level endurance 2 fair+  -HC        User Key  (r) = Recorded By, (t) = Taken By, (c) = Cosigned By    Initials Name Provider Type    HC BRANT Hicks Occupational Therapist                Outcome Measures       12/05/17 1200 12/05/17 1100 12/04/17 1500    How much help from another person do you currently need...    Turning from your back to your side while in flat bed without using bedrails?  3  -EE 3   -EE    Moving from lying on back to sitting on the side of a flat bed without bedrails?  3  -EE 3  -EE    Moving to and from a bed to a chair (including a wheelchair)?  3  -EE 3  -EE    Standing up from a chair using your arms (e.g., wheelchair, bedside chair)?  3  -EE 3  -EE    Climbing 3-5 steps with a railing?  2  -EE 2  -EE    To walk in hospital room?  3  -EE 3  -EE    AM-PAC 6 Clicks Score  17  -EE 17  -EE    How much help from another is currently needed...    Putting on and taking off regular lower body clothing? 3  -HC      Bathing (including washing, rinsing, and drying) 3  -HC      Toileting (which includes using toilet bed pan or urinal) 3  -HC      Putting on and taking off regular upper body clothing 3  -HC      Taking care of personal grooming (such as brushing teeth) 3  -HC      Eating meals 3  -HC      Score 18  -HC      Modified Alis Scale    Modified Alis Scale 4 - Moderately severe disability.  Unable to walk without assistance, and unable to attend to own bodily needs without assistance.  -HC      Functional Assessment    Outcome Measure Options AM-PAC 6 Clicks Daily Activity (OT);Modified [place]  -HC AM-PAC 6 Clicks Basic Mobility (PT)  -EE AM-PAC 6 Clicks Basic Mobility (PT)  -EE      12/03/17 1100          How much help from another person do you currently need...    Turning from your back to your side while in flat bed without using bedrails? 3  -PC      Moving from lying on back to sitting on the side of a flat bed without bedrails? 3  -PC      Moving to and from a bed to a chair (including a wheelchair)? 3  -PC      Standing up from a chair using your arms (e.g., wheelchair, bedside chair)? 3  -PC      Climbing 3-5 steps with a railing? 2  -PC      To walk in hospital room? 2  -PC      AM-PAC 6 Clicks Score 16  -PC      Functional Assessment    Outcome Measure Options AM-PAC 6 Clicks Basic Mobility (PT)  -PC        User Key  (r) = Recorded By, (t) = Taken By, (c) = Cosigned By     Initials Name Provider Type    PC Haily Champion, PT Physical Therapist    EE Saritha Way, PT Physical Therapist    HC Vida Mcgarry OTR Occupational Therapist          Time Calculation:   OT Start Time: 0950  OT Stop Time: 1015  OT Time Calculation (min): 25 min    Therapy Charges for Today     Code Description Service Date Service Provider Modifiers Qty    95484237789  OT EVAL LOW COMPLEXITY 2 12/5/2017 BRANT Hicks GO 1    [id number]  OT SELF CARE/MGMT/TRAIN EA 15 MIN 12/5/2017 BRANT Hicks GO 1               BRANT Hicks  12/5/2017

## 2017-12-05 NOTE — PLAN OF CARE
Problem: Patient Care Overview (Adult)  Goal: Plan of Care Review  Outcome: Ongoing (interventions implemented as appropriate)    12/05/17 1321   Coping/Psychosocial Response Interventions   Plan Of Care Reviewed With patient   Patient Care Overview   Progress improving

## 2017-12-05 NOTE — PAYOR COMM NOTE
"UR CONTACT:  ROB                  P: [phone]  F: [phone]  RE-REVIEW PLEASE.        Padmini aCrter (52 y.o. Female)     Date of Birth Social Security Number Address Home Phone MRN    1965  [address]  [facility] [phone] [id number]    Quaker Marital Status          Episcopal        Admission Date Admission Type Admitting Provider Attending Provider Department, Room/Bed    12/1/17 Emergency Ayl Drew MD Anaya, Fadi ELDRIDGE MD [facility] [address] [place], P578/1    Discharge Date Discharge Disposition Discharge Destination                      Attending Provider: Fadi Mathews MD     Allergies:  Sulfa Antibiotics, Beta Adrenergic Blockers, Codeine, Erythromycin, Penicillins, Tetracyclines & Related, Varenicline    Isolation:  None   Infection:  None   Code Status:  FULL    Ht:  165.1 cm (65\")   Wt:  66.2 kg (146 lb)    Admission Cmt:  None   Principal Problem:  None                Active Insurance as of 12/1/2017     Primary Coverage     Payor Plan Insurance Group Employer/Plan Group    Count includes the [facility] BLUE CROSS [employer] EMPL[employer]EE [id number]     Payor Plan Address Payor Plan Phone Number Effective From Effective To    [id number] [phone] 1/1/2015     [place] [address]       Subscriber Name Subscriber Birth Date Member ID       PADMINI CARTER 1965 [id number]                 Emergency Contacts      (Rel.) Home Phone Work Phone Mobile Phone    Letty Renner (Daughter) -- [phone]    Bekah Rosales (Relative) -- -- [phone]            Lines, Drains & Airways    Active LDAs     Name:   Placement date:   Placement time:   Site:   Days:    Peripheral IV Line - Single Lumen 12/04/17 1106  22 gauge  12/04/17    1106      less than 1                Hospital Medications (active)       Dose Frequency Start End    acetaminophen (TYLENOL) tablet 650 mg 650 mg Every 6 Hours PRN 12/2/2017     Sig - Route: Take 2 " "tablets by mouth Every 6 (Six) Hours As Needed for Mild Pain . - Oral    amLODIPine (NORVASC) tablet 5 mg 5 mg Daily 12/3/2017     Sig - Route: Take 1 tablet by mouth Daily. - Oral    atorvastatin (LIPITOR) tablet 80 mg 80 mg Nightly 12/1/2017     Sig - Route: Take 1 tablet by mouth Every Night. - Oral    butalbital-acetaminophen-caffeine (FIORICET, ESGIC) -40 MG per tablet 2 tablet 2 tablet Every 6 Hours PRN 12/4/2017     Sig - Route: Take 2 tablets by mouth Every 6 (Six) Hours As Needed for Headache. - Oral    calcium carbonate (TUMS) chewable tablet 500 mg (200 mg elemental) 1 tablet 3 Times Daily PRN 12/4/2017     Sig - Route: Chew 500 mg 3 (Three) Times a Day As Needed for Indigestion or Heartburn. - Oral    famotidine (PEPCID) tablet 20 mg 20 mg 2 Times Daily 12/4/2017     Sig - Route: Take 1 tablet by mouth 2 (Two) Times a Day. - Oral    folic acid (FOLVITE) tablet 1 mg 1 mg Daily 12/5/2017     Sig - Route: Take 1 tablet by mouth Daily. - Oral    HYDROcodone-acetaminophen (NORCO) 5-325 MG per tablet 1 tablet 1 tablet Every 6 Hours PRN 12/2/2017 12/12/2017    Sig - Route: Take 1 tablet by mouth Every 6 (Six) Hours As Needed for Moderate Pain . - Oral    Magnesium Sulfate 2 gram Bolus, followed by 8 gram infusion (total Mg dose 10 grams)- Mg less than or equal to 1mg/dL 2 g As Needed 12/2/2017     Sig - Route: Infuse 50 mL into a venous catheter As Needed (Mg less than or equal to 1mg/dL). - Intravenous    Linked Group 1:  \"Or\" Linked Group Details        magnesium sulfate 4 gram infusion- Mg 1.6-1.9 mg/dL 4 g As Needed 12/2/2017     Sig - Route: Infuse 100 mL into a venous catheter As Needed (Mg 1.6-1.9 mg/dL). - Intravenous    Linked Group 1:  \"Or\" Linked Group Details        Magnesium Sulfate 6 gram Infusion (2 gm x 3) -Mg 1.1 -1.5 mg/dL 2 g As Needed 12/2/2017     Sig - Route: Infuse 50 mL into a venous catheter As Needed (Mg 1.1 -1.5 mg/dL). - Intravenous    Linked Group 1:  \"Or\" Linked Group Details " "       ondansetron (ZOFRAN) injection 4 mg 4 mg Every 6 Hours PRN 12/2/2017     Sig - Route: Infuse 2 mL into a venous catheter Every 6 (Six) Hours As Needed for Nausea or Vomiting. - Intravenous    potassium chloride (KLOR-CON) packet 40 mEq 40 mEq As Needed 12/1/2017     Sig - Route: Take 40 mEq by mouth As Needed (potassium replacement, see admin instructions). - Oral    Linked Group 2:  \"Or\" Linked Group Details        potassium chloride (MICRO-K) CR capsule 40 mEq 40 mEq As Needed 12/1/2017     Sig - Route: Take 4 capsules by mouth As Needed (potassium replacement.  see admin instructions). - Oral    Linked Group 2:  \"Or\" Linked Group Details        potassium chloride 10 mEq in 100 mL IVPB 10 mEq Every 1 Hour PRN 12/1/2017     Sig - Route: Infuse 100 mL into a venous catheter Every 1 (One) Hour As Needed (potassium protocol PERIPHERAL - see admin instructions). - Intravenous    Linked Group 2:  \"Or\" Linked Group Details        QUEtiapine (SEROquel) tablet 25 mg 25 mg Nightly 12/5/2017     Sig - Route: Take 1 tablet by mouth Every Night. - Oral    rOPINIRole (REQUIP) tablet 1 mg 1 mg 2 Times Daily 12/2/2017     Sig - Route: Take 1 tablet by mouth 2 (Two) Times a Day. - Oral    rOPINIRole (REQUIP) tablet 1 mg 1 mg 2 Times Daily PRN 12/3/2017     Sig - Route: Take 1 tablet by mouth 2 (Two) Times a Day As Needed (leg cramps). - Oral    sodium chloride 0.9 % flush 1-10 mL 1-10 mL As Needed 12/1/2017     Sig - Route: Infuse 1-10 mL into a venous catheter As Needed for Line Care. - Intravenous    sodium chloride 0.9 % flush 10 mL 10 mL As Needed 12/1/2017     Sig - Route: Infuse 10 mL into a venous catheter As Needed for Line Care. - Intravenous    Cosign for Ordering: Accepted by Colton Leavitt MD on 12/1/2017  9:41 PM    sodium chloride 0.9 % infusion 75 mL/hr Continuous 12/1/2017     Sig - Route: Infuse 75 mL/hr into a venous catheter Continuous. - Intravenous    thiamine (VITAMIN B-1) tablet 200 mg 200 mg Daily " "2017     Sig - Route: Take 2 tablets by mouth Daily. - Oral          Operative/Procedure Notes     No notes of this type exist for this encounter.           Physician Progress Notes       RUMA Cabrera at 2017 12:33 PM  Version 1 of 1         DOS: 2017  NAME: Angelita Brambila   : 1965  PCP: Ivonne Bradley MD  Chief Complaint   Patient presents with   • Neuro Deficit(s)     CC: Stroke    Subjective: No new events overnight per RN. Patient does c/o headache that is a \"8/10\" today, was a \"9/10\" yesterday. She currently being treated with lortab prn for HA. She denies any new stroke/TIA symptoms.     Objective:  Vital signs: /79  Pulse 82  Temp 98.2 °F (36.8 °C) (Oral)   Resp 19  Ht 65\" (165.1 cm)  Wt 146 lb (66.2 kg)  LMP Comment: hysterectomy   SpO2 94%  BMI 24.3 kg/m2      Physical Exam:  GENERAL: NAD  HEENT: Normocephalic, atraumatic   COR: RRR  Resp: Even and unlabored  Extremities: No signs of distal embolization. Decreased ROM of right wrist secondary to pain, bruising.    Neurological:   MS: AO. Naming and repetition intact. Followed commands. Normal higher integrative function.  CN: II-XII normal except for left facial weakness and left HHH. mild dsyarthria  Motor: Normal strength on right. LUE with pronator drift strength on left at least 4-/5  Sensory: Decreased sensation of LUE  Coordination: Normal on right.     Results Review:     I reviewed the patient's new clinical results.    Current Medications:    amLODIPine 5 mg Oral Daily   atorvastatin 80 mg Oral Nightly   famotidine 20 mg Oral BID   rOPINIRole 1 mg Oral BID       sodium chloride 75 mL/hr Last Rate: 75 mL/hr (1718)       Medications Reviewed    Laboratory results:  Lab Results   Component Value Date    GLUCOSE 93 2017    CALCIUM 7.9 (L) 2017     2017    K 3.5 2017    CO2 22.4 2017     2017    BUN 6 2017    CREATININE 0.57 2017 "    EGFRIFNONA 111 12/04/2017    BCR 10.5 12/04/2017    ANIONGAP 12.6 12/04/2017     Lab Results   Component Value Date    WBC 6.91 12/04/2017    HGB 12.3 12/04/2017    HCT 37.0 12/04/2017    .4 (H) 12/04/2017     12/04/2017        Results from last 7 days  Lab Units 12/02/17  0631   CHOLESTEROL mg/dL 184     Lab Results   Component Value Date    INR 1.04 12/04/2017    INR 0.96 12/01/2017    PROTIME 13.2 12/04/2017    PROTIME 12.4 12/01/2017     No results found for: TSH  Lab Results   Component Value Date    LDLCALC 116 (H) 12/02/2017     Lab Results   Component Value Date    HGBA1C 4.81 12/02/2017     No components found for: B12    Review of imaging:  CTA h/n, CTP 12/1/17: IMPRESSION:  1.  The CT perfusion demonstrates abnormal perfusion involving the right  frontal lobe with a core area of abnormal CBF estimated to be 6 mL in  volume. There is delayed perfusion estimated to be 28 mL in volume  resulting in a mismatch ratio of 4.7. However, it should be noted that  this may be under represented as the CT examination demonstrates more  superior lateral areas of abnormal attenuation. There is a small linear  area of increased attenuation noted on the noncontrasted CT examination  suggesting thrombus overlying the right frontal lobe laterally.  2.  The CT angiogram demonstrates approximately 50% stenosis of the  proximal aspect of the internal carotid artery on the right secondary to  markedly irregular noncalcified plaque/thrombus. Intracranially there is  decreased opacification suggesting thrombus involving the small anterior  sylvian branches of the posterior division of the right middle cerebral  artery.  CT head 12/2/17: CT scan was performed through the brain without contrast and compared to  12/01/2017 and now demonstrates areas of hemorrhagic transformation in  the area of acute right MCA infarct. The largest area of hemorrhage is  in the right temporofrontal region and measures up to 1.2 x 1.5  cm.  Other adjacent areas are much less defined and consistent with petechial  hemorrhage. There is very slight mass effect with some effacement of the  cortical sulci, consistent with the infarct. There is also slight  midline shift to the left measuring 2 or 3 mm.  MRI brain 12/3/17: CONCLUSION: Large acute to subacute right MCA distribution infarct with  further areas of hemorrhagic transformation and slight worsening of  associated mass effect when compared to yesterday's CT scan.     Impression: Ms. Brambila is a 51 yo with CAD (MI in 2004), HLD and long history of smoking who transferred on 12/1 with left side weakness with a NIH of 10 and received  IV TPA. I discussed her imaging with Dr. Bunch which shows a RMCA stroke with hemorrhagic conversion. Per Dr. Bunch, radiologist didn't feel they could definitively separate atherosclerotic lesion from a thrombus. Which practical purposes at this point does not .  She is not a candidate for anticoagulation at this point due to her hemorrhagic conversion.  Her TTE was unremarkable and given the carotid stenosis on the left would assume that the source was artery to artery embolism. Plans are to hold antiplatelets for now and repeat a CTA head/neck on 3 weeks. Clinically she continues with headache which is slightly better today. Recommend avoiding narcotics for headache pain.. No further work-up necessary at this time. Continue aggressive control of risk factors including smoking cessation. Discussed this at length with patient. Continue Lipitor. PT/OT/ST. CCP for discharge planning/rehab needs. Please call with questions or concerns. Follow-up in stroke clinic pending results of CTA h/n as outpatient.     Plan:  Hold antiplatelets for now  CTA head/neck as outpatient in 3 weeks.   Lipitor 80 mg  [facility]  Non-pharmacological DVT prophylaxis  EKG Tele  PT/OT/ST  Stroke Education  Blood pressure control to <130/80  Goal LDL <70-recommend  high dose statins-   Serum glucose < 140    Call 911 for stroke/TIA symptoms  CTA head/neck in 3 weeks.   F/U in stroke clinic to be determined pending results of above, call [phone] for questions    I have discussed the above with the patient and family.  RUMA Cabrera  12/04/17  12:33 PM      Active Problems:    CVA (cerebral vascular accident)       Electronically signed by RUMA Cabrera at 12/4/2017  2:44 PM      Fadi Mathews MD at 12/4/2017  4:24 PM  Version 1 of 1           Dr. JUANA Mathews    [address]    12/4/2017    Patient ID:  Name:  Angelita Brambila  MRN:  [id number]  1965  52 y.o.  female            CC/Reason for visit: Follow-up for acute stroke    HPI: Patient is feeling better.  Her left-sided weakness is slowly improving.  She actually got up with physical therapy today.  Speech therapy has also upgraded her diet and advanced her diet.    Vitals:  Vitals:    12/04/17 0225 12/04/17 0527 12/04/17 1014 12/04/17 1316   BP: 116/72 130/88 131/79 145/95   BP Location: Right arm Right arm  Right arm   Patient Position: Lying Lying  Sitting   Pulse: 80 82  96   Resp: 18 19  18   Temp: 97.9 °F (36.6 °C) 98.2 °F (36.8 °C)  97.8 °F (36.6 °C)   TempSrc: Oral Oral  Oral   SpO2: 91% 94%  96%   Weight:       Height:               Body mass index is 24.3 kg/(m^2).    Intake/Output Summary (Last 24 hours) at 12/04/17 1624  Last data filed at 12/04/17 0618   Gross per 24 hour   Intake             1000 ml   Output                0 ml   Net             1000 ml       Exam:  GEN:  No distress, Awake, alert.  Still weak in the left upper and left lower extremities but much improvement  LUNGS: Clear breath sounds bilat, nonlabored breathing  CV:  Normal S1S2, without murmur, no edema  ABD:  Non tender, no enlarged liver or masses  EXT:  No cyanosis or clubbing    Scheduled meds:    amLODIPine 5 mg Oral Daily   atorvastatin 80 mg Oral Nightly   famotidine 20 mg Oral BID    rOPINIRole 1 mg Oral BID     IV meds:                        sodium chloride 75 mL/hr Last Rate: 75 mL/hr (12/04/17 0618)       Data Review:   I reviewed the patient's medications and new clinical results.  Lab Results   Component Value Date    CALCIUM 7.9 (L) 12/04/2017    PHOS 2.9 12/04/2017    MG 2.4 12/04/2017    MG 3.2 (H) 12/03/2017    MG 1.6 12/02/2017       Results from last 7 days  Lab Units 12/04/17  0609 12/04/17  0406 12/03/17  0521 12/02/17  1651 12/01/17  1537   SODIUM mmol/L  --  138 137  --  142   POTASSIUM mmol/L  --  3.5 3.9 3.2* 2.9*   CHLORIDE mmol/L  --  103 100  --  98   CO2 mmol/L  --  22.4 23.8  --  30.0*   BUN mg/dL  --  6 6  --  5*   CREATININE mg/dL  --  0.57 0.62  --  0.75   CALCIUM mg/dL  --  7.9* 7.9*  --  8.9   BILIRUBIN mg/dL  --  0.6  --   --  0.8   ALK PHOS U/L  --  70  --   --  88   ALT (SGPT) U/L  --  13  --   --  24   AST (SGOT) U/L  --  19  --   --  26   GLUCOSE mg/dL  --  93 105*  --  106*   WBC 10*3/mm3 6.91  --   --   --  9.02   HEMOGLOBIN g/dL 12.3  --   --   --  16.4*   PLATELETS 10*3/mm3 259  --   --   --  300   INR   --  1.04  --   --  0.96   PROBNP pg/mL  --  313.8  --   --   --            ASSESSMENT:   Active Problems:    CVA (cerebral vascular accident)  Oropharyngeal dysphagia  Left hemiparesis  Hypertension  Hypokalemia  Hemorrhagic transformation of CVA      PLAN:  Patient slowly improving.  Continue physical therapy anticipate discharge tomorrow to rehabilitation facility if she is appropriate.    The plan from a neurology standpoint is to avoid anticoagulation/antiplatelet therapy due to her hemorrhagic transformation.  They recommend repeat CT Mary Shafer of the head and neck in 3 weeks.  The patient continues to improve.  We will give her Fioricet for her headaches and avoid narcotics.  No further workup is necessary at this time.  Anticipating discharge tomorrow.        Fadi Mathews MD  12/4/2017     Electronically signed by Fadi Mathews MD at  2017  4:29 PM          Beronica Devi MS CCC-SLP Speech and Language Pathologist Signed Speech Therapy MBS/VFSS/FEES Date of Service: 2017  9:25 AM          Acute Care - Speech Language Pathology   Swallow Initial Evaluation- VFSS [facility]     Patient Name: Angelita Brambila                                      : 1965                                          MRN: [id number]  Today's Date: 2017                                       Onset of Illness/Injury or Date of Surgery Date: 17                                                                                               Admit Date: 2017  SPEECH-LANGUAGE PATHOLOGY EVALUTION - VFSS  Subjective: The patient was seen on this date for a VFSS(Videofluoroscopic Swallowing Study).  Patient was alert and cooperative.    Significant history: R MCA stroke.   Objective: Risks/benefits were reviewed with the patient, and consent was obtained. The study was completed with SLP present and Radiologist review. The patient was seen in lateral view(s). Textures given included thin liquid, nectar thick liquid, puree consistency, mechanical soft consistency and regular consistency.  Assessment: Difficulties were noted with thin liquid, mechanical soft consistency and regular consistency.  The patient demonstrated trace, silent posterior aspiration noted with thins via straw and mixed mech soft..   SLP Findings: Pt presents with moderate oropharyngeal dysphagia characterized by lingual weakness, lingual pumping during bolus propulsion, swallow delay, and sluggish hyolaryngeal elevation/excursion. No penetration with single drinks thins via cup. Shallow, transient penetration with consecutive drinks thins via cup. Trace, silent, posterior aspiration with thins via straw x1. Pt with mild residue post swallow at pyriforms with large drinks, cues to clear. No penetration with trials of nectar via cup or straw. Swallow initiaiton WFLs with puree. Mild  base of lingual residue post swallow. Pt cleared without cues. Spill to pyriforms with mixed; trace, posterior aspiration without cough with thin portion of mixed. Prolonged bolus preparation/formation/propulsion with regular. Mild-moderate oral residue post swallow, pt required cues to clear. No pharyngeal residue post swallow. Liquid wash assisted with swallow initiation. Of note, no penetration observed with large drinks thins as liquid wash.  Recommendations: Diet Textures: thin liquid, mechanical soft consistency with no mixed textures food. Medications should be taken whole with puree.   Recommended Strategies: check for pocketing on L, Upright for PO, small bites and sips and no straw. Oral care before breakfast, after all meals and PRN.  Other Recommended Evaluations: Speech-Language Evaluation and Cognitive-Linguistic Evaluation    Dysphagia therapy is not recommended. Rationale: to monitor diet tolerance and improve swallow function.                  SWALLOW EVALUATION                   EDUCATION  The patient has been educated in the following areas:   Modified Diet Instruction.     SLP Recommendation and Plan  SLP Swallowing Diagnosis: moderate dysphagia  SLP Diet Recommendation: IV - mechanical soft, no mixed consistencies, thin liquids  Recommended Feeding/Eating Techniques: no straws, small sips/bites, maintain upright posture during/after eating for 30 mins, check mouth frequently for oral residue/pocketing  SLP Rec. for Method of Medication Administration: meds whole in pudding/applesauce  Monitor For Signs Of Aspiration: cough, elevated WBC count, gurgly voice, throat clearing, fever, upper respiratory infection, right lower lobe infiltrates, pneumonia  Recommended Diagnostics: reassess via [facility]SS ([facility]  Criteria for Skilled Therapeutic Interventions Met: skilled criteria for dysphagia intervention met  Anticipated Discharge Disposition: inpatient rehabilitation facility  Rehab Potential/Prognosis,  Swallowing: good, to achieve stated therapy goals  Therapy Frequency: PRN              Plan of Care Review  Plan Of Care Reviewed With: patient  Progress: improving  Outcome Summary/Follow up Plan: VFSS completed. Trace, silent, posterior aspiration with thins via straw and mixed mech soft. Mild-moderate oral residue with regular. SLP recs upgrade to thins (single drinks, no straws) and to continue no mixed mech soft. Will continue to follow for dysphagia tx, cog eval, and motor speech eval as indicated.                    Beronica Devi, MS CCC-SLP                                            12/4/2017

## 2017-12-05 NOTE — PLAN OF CARE
Problem: Stroke (Ischemic) (Adult)  Goal: Signs and Symptoms of Listed Potential Problems Will be Absent or Manageable (Stroke)  Outcome: Ongoing (interventions implemented as appropriate)    Problem: Patient Care Overview (Adult)  Goal: Plan of Care Review  Outcome: Ongoing (interventions implemented as appropriate)

## 2017-12-05 NOTE — PLAN OF CARE
Problem: Inpatient SLP  Goal: Dysphagia- Patient will safely consume diet as per recommendation with no signs/symptoms of aspiration  Outcome: Ongoing (interventions implemented as appropriate)    12/02/17 1446 12/04/17 0915   Safely Consume Diet   Safely Consume Diet- SLP, Date Established 12/02/17 --    Safely Consume Diet- SLP, Time to Achieve by discharge --    Safely Consume Diet- SLP, Outcome --  goal ongoing

## 2017-12-05 NOTE — THERAPY TREATMENT NOTE
"Acute Care - Physical Therapy Treatment Note  [facility]     Patient Name: Angelita Brambila  : 1965  MRN: [id number]  Today's Date: 2017  Onset of Illness/Injury or Date of Surgery Date: 17  Date of Referral to PT: 17  Referring Physician: Rajendra    Admit Date: 2017    Visit Dx:    ICD-10-CM ICD-9-CM   1. Cerebrovascular accident (CVA), unspecified mechanism I63.9 434.91   2. Hypokalemia E87.6 276.8   3. Hemiparesis affecting left side as late effect of cerebrovascular accident I69.354 438.20     Patient Active Problem List   Diagnosis   • Complete tear of right rotator cuff   • Precordial pain   • Dyspnea on exertion   • Palpitations   • Essential hypertension   • Hyperlipidemia   • Tobacco abuse   • CVA (cerebral vascular accident)               Adult Rehabilitation Note       17 1104 17 1437       Rehab Assessment/Intervention    Discipline physical therapist  -EE physical therapist  -EE     Document Type therapy note (daily note)  -EE therapy note (daily note)  -EE     Subjective Information agree to therapy;no complaints  -EE agree to therapy;complains of;pain  -EE     Patient Effort, Rehab Treatment good  -EE good  -EE     Symptoms Noted During/After Treatment none  -EE increased pain;other (see comments)   headache  -EE     Symptoms Noted Comment  Pt reported \"I think my headache is coming back\" following activity. Jyothi PABLO, at bedside and aware.  -EE     Precautions/Limitations fall precautions;other (see comments)   possible R wrist fx  -EE fall precautions;other (see comments)   possible R wrist fx; has not been splinted yet  -EE     Specific Treatment Considerations  Potential R wrist fx that has not been splinted; assisted pt by holding R elbow/forearm and maintained NWB on R hand/wrist as a precaution  -EE     Recorded by [EE] Saritha Way, PT [EE] Saritha Way, PT     Pain Assessment    Pain Assessment 0-10  -EE 0-10  -EE     Pain Score 3  -EE 5  -EE     Pain " Type Chronic pain  -EE Acute pain  -EE     Pain Location Back  -EE Head   also R wrist soreness from suspected fx  -EE     Pain Orientation Lower  -EE      Pain Descriptors  Aching;Sore  -EE     Pain Intervention(s) Repositioned;Ambulation/increased activity  -EE Repositioned;Rest  -EE     Response to Interventions tolerated  -EE tolerated  -EE     Recorded by [EE] Saritha Way PT [EE] Saritha Way PT     Vision Assessment/Intervention    Visual Impairment inattention to the left;left neglect;needs cues to attend visually  -EE inattention to the left;left neglect;needs cues to attend visually  -EE     Visual Impairment Comment  Demonstrates preference for R gaze, able to turn head to midline and to L with cueing  -EE     Recorded by [EE] Saritha Way PT [EE] Saritha Way PT     Cognitive Assessment/Intervention    Current Cognitive/Communication Assessment impaired  -EE functional  -EE     Orientation Status oriented to;person;place  -EE oriented x 4  -EE     Follows Commands/Answers Questions 100% of the time;able to follow single-step instructions;needs cueing;needs increased time;needs repetition  -% of the time  -EE     Personal Safety mild impairment;at risk behaviors demonstrated;decreased awareness, need for safety;decreased insight to deficits  -EE mild impairment;decreased insight to deficits  -EE     Personal Safety Interventions fall prevention program maintained;gait belt;nonskid shoes/slippers when out of bed;supervised activity  -EE fall prevention program maintained;gait belt;muscle strengthening facilitated;nonskid shoes/slippers when out of bed;supervised activity  -EE     Recorded by [EE] Saritha Way PT [EE] Saritha Way PT     Bed Mobility, Assessment/Treatment    Bed Mobility, Assistive Device  bed rails;head of bed elevated  -EE     Bed Mob, Supine to Sit, [place] not tested  -EE contact guard assist;verbal cues required  -EE     Bed Mob, Sit to Supine, [place] not tested  -EE  supervision required  -EE     Bed Mobility, Impairments  strength decreased;pain;motor control impaired;coordination impaired  -EE     Bed Mobility, Comment up in room with nsg staff, then up in chair  -EE      Recorded by [EE] Saritha Way PT [EE] Saritha Way PT     Transfer Assessment/Treatment    Transfers, Sit-Stand [place] contact guard assist;verbal cues required  -EE contact guard assist;verbal cues required;nonverbal cues required (demo/gesture);hand held assist  -EE     Transfers, Stand-Sit [place] contact guard assist;verbal cues required  -EE contact guard assist;verbal cues required;hand held assist  -EE     Transfer, Safety Issues balance decreased during turns  -EE step length decreased;balance decreased during turns  -EE     Transfer, Impairments strength decreased;impaired balance;coordination impaired  -EE strength decreased;impaired balance;coordination impaired;motor control impaired;pain  -EE     Transfer, Comment cues to scan to L during turns to avoid obstacles  -EE      Recorded by [EE] Saritha Way PT [EE] Saritha Way PT     Gait Assessment/Treatment    Gait, [place] Level contact guard assist;minimum assist (75% patient effort);verbal cues required  -EE contact guard assist;minimum assist (75% patient effort);verbal cues required;nonverbal cues required (demo/gesture);hand held assist  -EE     Gait, Distance (Feet) 400  -  -EE     Gait, Gait Deviations ronnell decreased;narrow base;step length decreased;stride length decreased  -EE ronnell decreased;leans to the left;ataxia;narrow base;weight-shifting ability decreased;step length decreased  -EE     Gait, Safety Issues step length decreased;balance decreased during turns  -EE step length decreased;weight-shifting ability decreased;balance decreased during turns  -EE     Gait, Impairments strength decreased;impaired balance;impaired vision;coordination impaired  -EE strength decreased;impaired balance;coordination  impaired;impaired vision;motor control impaired  -EE     Gait, Comment Cueing required to scan to L and avoid obstacles on L side. Able to ambulate and maintain balance w/o assist at R UE today.   -EE Pt assisted by holding R elbow; avoided weightbearing on R hand/wrist. Verbal cues to scan to L and avoid obstacles on L sided during ambulation.  -EE     Recorded by [EE] Saritha Way, PT [EE] Saritha Way PT     Positioning and Restraints    Pre-Treatment Position in bed  -EE in bed  -EE     Post Treatment Position chair  -EE bed  -EE     In Bed  fowlers;call light within reach;encouraged to call for assist;exit alarm on;with family/caregiver;notified nsg;SCD pump applied;LUE elevated  -EE     In Chair sitting;call light within reach;encouraged to call for assist;exit alarm on;notified nsg  -EE      Recorded by [EE] Saritha Way PT [EE] Saritha Way PT       User Key  (r) = Recorded By, (t) = Taken By, (c) = Cosigned By    Initials Name Effective Dates    EE Saritha Way PT 12/01/15 -                 IP PT Goals       12/03/17 1128          Bed Mobility PT LTG    Bed Mobility PT LTG, Date Established 12/03/17  -PC      Bed Mobility PT LTG, Time to Achieve 1 wk  -PC      Bed Mobility PT LTG, Activity Type supine to sit/sit to supine  -PC      Bed Mobility PT LTG, [place] Level supervision required  -PC      Transfer Training PT LTG    Transfer Training PT LTG, Date Established 12/03/17  -PC      Transfer Training PT LTG, Time to Achieve 1 wk  -PC      Transfer Training PT LTG, Activity Type sit to stand/stand to sit  -PC      Transfer Training PT LTG, [place] Level contact guard assist  -PC      Gait Training PT LTG    Gait Training Goal PT LTG, Date Established 12/03/17  -PC      Gait Training Goal PT LTG, Time to Achieve 1 wk  -PC      Gait Training Goal PT LTG, [place] Level contact guard assist  -PC      Gait Training Goal PT LTG, Distance to Achieve 150 ft with appropriate assistive device  -PC         User Key  (r) = Recorded By, (t) = Taken By, (c) = Cosigned By    Initials Name Provider Type    PC Haily Champion, PT Physical Therapist          Physical Therapy Education     Title: PT OT SLP Therapies (Active)     Topic: Physical Therapy (Active)     Point: Mobility training (Active)    Learning Progress Summary    Learner Readiness Method Response Comment Documented by Status   Patient Acceptance E,TB NR  EE 12/05/17 1116 Active    Acceptance E,TB NR  EE 12/04/17 1500 Active    Acceptance E,D NR  PC 12/03/17 1127 Active               Point: Home exercise program (Active)    Learning Progress Summary    Learner Readiness Method Response Comment Documented by Status   Patient Acceptance E,D NR  PC 12/03/17 1127 Active               Point: Body mechanics (Active)    Learning Progress Summary    Learner Readiness Method Response Comment Documented by Status   Patient Acceptance E,TB NR  EE 12/05/17 1116 Active    Acceptance E,TB NR  EE 12/04/17 1500 Active    Acceptance E,D NR  PC 12/03/17 1127 Active               Point: Precautions (Active)    Learning Progress Summary    Learner Readiness Method Response Comment Documented by Status   Patient Acceptance E,TB NR  EE 12/05/17 1116 Active    Acceptance E,TB NR  EE 12/04/17 1500 Active    Acceptance E,D NR  PC 12/03/17 1127 Active                      User Key     Initials Effective Dates Name Provider Type Discipline    PC 12/01/15 -  Haily Champion, PT Physical Therapist PT    EE 12/01/15 -  Saritha Way, PT Physical Therapist PT                    PT Recommendation and Plan  Anticipated Discharge Disposition: inpatient rehabilitation facility  Planned Therapy Interventions: bed mobility training, balance training, gait training, home exercise program, strengthening, transfer training  PT Frequency: daily  Plan of Care Review  Plan Of Care Reviewed With: patient  Progress: progress towards functional goals is fair  Outcome Summary/Follow up Plan: Pt able to  maintain gait distance, demonstrating improved balance as evidenced by ability to ambulate w/o assist at R UE. Continues to require close assist and repeated cues to attend to L side. Also more confused today and requiring redirection during ambulation. Will continue to increase independence as able.           Outcome Measures       12/05/17 1100 12/04/17 1500 12/03/17 1100    How much help from another person do you currently need...    Turning from your back to your side while in flat bed without using bedrails? 3  -EE 3  -EE 3  -PC    Moving from lying on back to sitting on the side of a flat bed without bedrails? 3  -EE 3  -EE 3  -PC    Moving to and from a bed to a chair (including a wheelchair)? 3  -EE 3  -EE 3  -PC    Standing up from a chair using your arms (e.g., wheelchair, bedside chair)? 3  -EE 3  -EE 3  -PC    Climbing 3-5 steps with a railing? 2  -EE 2  -EE 2  -PC    To walk in hospital room? 3  -EE 3  -EE 2  -PC    AM-PAC 6 Clicks Score 17  -EE 17  -EE 16  -PC    Functional Assessment    Outcome Measure Options AM-PAC 6 Clicks Basic Mobility (PT)  -EE AM-PAC 6 Clicks Basic Mobility (PT)  -EE AM-PAC 6 Clicks Basic Mobility (PT)  -PC      User Key  (r) = Recorded By, (t) = Taken By, (c) = Cosigned By    Initials Name Provider Type    PC Haily Champion PT Physical Therapist    EE Saritha Way PT Physical Therapist           Time Calculation:         PT Charges       12/05/17 1118          Time Calculation    Start Time 1104  -EE      Stop Time 1115  -EE      Time Calculation (min) 11 min  -EE      PT Received On 12/05/17  -EE      PT - Next Appointment 12/06/17  -EE        User Key  (r) = Recorded By, (t) = Taken By, (c) = Cosigned By    Initials Name Provider Type    MARCIAL Way PT Physical Therapist          Therapy Charges for Today     Code Description Service Date Service Provider Modifiers Qty    25342899443  PT THER PROC EA 15 MIN 12/4/2017 Saritha Way PT GP 1    [id number]  PT THER  PROC EA 15 MIN 12/5/2017 Saritha Way, PT GP 1    71288181011 HC PT THER SUPP EA 15 MIN 12/5/2017 Saritha Way, PT GP 1          PT G-Codes  Outcome Measure Options: AM-PAC 6 Clicks Basic Mobility (PT)    Saritha Way, PT  12/5/2017

## 2017-12-05 NOTE — PLAN OF CARE
Problem: Patient Care Overview (Adult)  Goal: Plan of Care Review  Outcome: Ongoing (interventions implemented as appropriate)    12/05/17 1223   Coping/Psychosocial Response Interventions   Plan Of Care Reviewed With patient   Outcome Evaluation   Outcome Summary/Follow up Plan Pt presented to OT eval alert, confused, and oriented x2. Pt presents with functional deficits in mobility, transfers, ADLs, FMC and GMC, balance, and L side neglect. Pt will benefit from continued skilled OT services while inpatient. Recommending IRF at d/c. Will continue to follow.         Problem: Inpatient Occupational Therapy  Goal: Transfer Training Goal 1 LTG- OT  Outcome: Ongoing (interventions implemented as appropriate)    12/05/17 1223   Transfer Training OT LTG   Transfer Training OT LTG, Date Established 12/05/17   Transfer Training OT LTG, Time to Achieve 1 wk   Transfer Training OT LTG, Activity Type bed to chair /chair to bed;toilet;sit to stand/stand to sit   Transfer Training OT LTG, [place] Level supervision required       Goal: Coordination Goal LTG- OT  Outcome: Ongoing (interventions implemented as appropriate)    12/05/17 1223   Coordination OT LTG   Coordination OT LTG, Date Established 12/05/17   Coordination OT LTG, Time to Achieve 1 wk   Coordination OT LTG, Activity Type FM task;GM task   Coordination OT LTG, [place] Level min assist;with verbal cues;setup       Goal: ADL Goal LTG- OT  Outcome: Ongoing (interventions implemented as appropriate)    12/05/17 1223   ADL OT LTG   ADL OT LTG, Date Established 12/05/17   ADL OT LTG, Time to Achieve 1 wk   ADL OT LTG, Activity Type ADL skills   ADL OT LTG, [place] Level min assist;contact guard       Goal: Endurance Goal LTG- OT  Outcome: Ongoing (interventions implemented as appropriate)    12/05/17 1223   Endurance OT LTG   Endurance Goal OT LTG, Date Established 12/05/17   Endurance Goal OT LTG, Time to Achieve 1 wk   Endurance Goal OT LTG, Activity  Level endurance 2 fair+

## 2017-12-05 NOTE — NURSING NOTE
Remains confused, attempting to get out of bed multiple times, and unable to redirect. Notified MD. New orders rec'd.

## 2017-12-05 NOTE — NURSING NOTE
Continuous attempts to get oob, pulling at IV site, pulse ox sensor, and SCDs and TEDS. Assisted legs back into bed, repositioned patient in bed, offered bedpan, wrapped IV tubing, and moved pulse ox sensor. Oriented only to person. Unable to verbally redirect patient. Medicated with prn ativan.

## 2017-12-05 NOTE — PROGRESS NOTES
"DOS: 2017  NAME: Angelita Brambila   : 1965  PCP: Ivonne Bradley MD  Chief Complaint   Patient presents with   • Neuro Deficit(s)     CC: Stroke    Subjective: Overnight patient became increasing confused and paranoid NIHSS 4>5. STAT CT completed; showed slight increase in mass effect otherwise no change. We recently discover that patient drinks 1-2 gin/tonic nightly. Medicine added Thiamine, folate and ativan.     .     Objective:  Vital signs: /80 (BP Location: Left arm, Patient Position: Lying)  Pulse 93  Temp 98.3 °F (36.8 °C) (Oral)   Resp 20  Ht 165.1 cm (65\")  Wt 66.2 kg (146 lb)  LMP Comment: hysterectomy   SpO2 97%  BMI 24.3 kg/m2     Physical Exam:  GENERAL: NAD  HEENT: Normocephalic, atraumatic   COR: RRR  Resp: Even and unlabored  Extremities: No signs of distal embolization. Decreased ROM of right wrist secondary to pain, bruising.    Neurological:   MS: AO. Naming and repetition intact. Followed commands. Normal higher integrative function.  CN: II-XII normal except for left facial weakness and left HHH. mild dysarthria  Motor: Normal strength on right. LUE with pronator drift  Sensory: Decreased sensation of LUE  Coordination: Normal on right.     Results Review:     I reviewed the patient's new clinical results.    Current Medications:    amLODIPine 5 mg Oral Daily   aspirin 81 mg Oral Daily   atorvastatin 80 mg Oral Nightly   famotidine 20 mg Oral BID   folic acid 1 mg Oral Daily   nicotine 1 patch Transdermal Q24H   QUEtiapine 25 mg Oral Nightly   rOPINIRole 1 mg Oral BID   thiamine 200 mg Oral Daily       sodium chloride 75 mL/hr Last Rate: 75 mL/hr (17 0840)       Medications Reviewed    Laboratory results:  Lab Results   Component Value Date    GLUCOSE 93 2017    CALCIUM 7.9 (L) 2017     2017    K 3.5 2017    CO2 22.4 2017     2017    BUN 6 2017    CREATININE 0.57 2017    EGFRIFNONA 111 2017    " BCR 10.5 12/04/2017    ANIONGAP 12.6 12/04/2017     Lab Results   Component Value Date    WBC 6.91 12/04/2017    HGB 12.3 12/04/2017    HCT 37.0 12/04/2017    .4 (H) 12/04/2017     12/04/2017        Results from last 7 days  Lab Units 12/02/17  0631   CHOLESTEROL mg/dL 184     Lab Results   Component Value Date    INR 1.04 12/04/2017    INR 0.96 12/01/2017    PROTIME 13.2 12/04/2017    PROTIME 12.4 12/01/2017     No results found for: TSH  Lab Results   Component Value Date    LDLCALC 116 (H) 12/02/2017     Lab Results   Component Value Date    HGBA1C 4.81 12/02/2017     No components found for: B12    Review of imaging:  CTA h/n, CTP 12/1/17: IMPRESSION:  1.  The CT perfusion demonstrates abnormal perfusion involving the right  frontal lobe with a core area of abnormal CBF estimated to be 6 mL in  volume. There is delayed perfusion estimated to be 28 mL in volume  resulting in a mismatch ratio of 4.7. However, it should be noted that  this may be under represented as the CT examination demonstrates more  superior lateral areas of abnormal attenuation. There is a small linear  area of increased attenuation noted on the noncontrasted CT examination  suggesting thrombus overlying the right frontal lobe laterally.  2.  The CT angiogram demonstrates approximately 50% stenosis of the  proximal aspect of the internal carotid artery on the right secondary to  markedly irregular noncalcified plaque/thrombus. Intracranially there is  decreased opacification suggesting thrombus involving the small anterior  sylvian branches of the posterior division of the right middle cerebral  artery.  CT head 12/2/17: CT scan was performed through the brain without contrast and compared to  12/01/2017 and now demonstrates areas of hemorrhagic transformation in  the area of acute right MCA infarct. The largest area of hemorrhage is  in the right temporofrontal region and measures up to 1.2 x 1.5 cm.  Other adjacent areas are  much less defined and consistent with petechial  hemorrhage. There is very slight mass effect with some effacement of the  cortical sulci, consistent with the infarct. There is also slight  midline shift to the left measuring 2 or 3 mm.  MRI brain 12/3/17: CONCLUSION: Large acute to subacute right MCA distribution infarct with  further areas of hemorrhagic transformation and slight worsening of  associated mass effect when compared to yesterday's CT scan.     Impression: Ms. Brambila is a 51 yo with CAD (MI in 2004), HLD, long history of smoking and daily ETOH use who transferred on 12/1 with left side weakness with a NIH of 10; received  IV TPA. Dr. Adrian reviewed images since admission and based on his interpretation and benefit outweighing risk we recommend ASA 81mg daily and TCD w/ emboli monitoring; DOMI if negative to further evaluate source for CVA. Recent notification of patient alcohol intake and the lack of benzodiazepines does not lend an excuse for patients agitation to be from ETOH withdrawal considering she has been a IP for 4 days and this would have presented itself before now. We will order an EEG to further evaluate. No mention of headache on exam. Continue aggressive control of risk factors including smoking cessation and omission of ETOH. Continue Lipitor and aspirin. PT/OT/ST. CCP for discharge planning/rehab needs. Please call with questions or concerns. Follow-up in stroke clinic with Nguyễn.      Plan:  Aspirin 81mg   TCD w/emboli monitoring if negative DOMI recommended   Lipitor 80 mg  Hydrate  Neurochecks  Non-pharmacological DVT prophylaxis  EKG Tele  PT/OT/ST  Stroke Education  Blood pressure control to <130/80  Goal LDL <70-recommend high dose statins-   Serum glucose < 140    Call [phone] for stroke/TIA symptoms  CTA head/neck in 3 weeks.   F/U in stroke clinic to be determined pending results of above, call [phone] for questions    I have discussed the above with the patient and  family.  All imaging and labs reviewed with Dr. Adrian and he agrees with radiology impressions and plan. No significant lab abnormalities.     Starr Varner, APRN  12/05/17  12:04 PM      Active Problems:    CVA (cerebral vascular accident)

## 2017-12-06 ENCOUNTER — APPOINTMENT (OUTPATIENT)
Dept: NEUROLOGY | Facility: HOSPITAL | Age: 52
End: 2017-12-06

## 2017-12-06 LAB
ALBUMIN SERPL-MCNC: 3.4 G/DL (ref 3.5–5.2)
ALBUMIN/GLOB SERPL: 1.1 G/DL
ALP SERPL-CCNC: 92 U/L (ref 39–117)
ALT SERPL W P-5'-P-CCNC: 31 U/L (ref 1–33)
ANION GAP SERPL CALCULATED.3IONS-SCNC: 16.3 MMOL/L
AST SERPL-CCNC: 61 U/L (ref 1–32)
BASOPHILS # BLD AUTO: 0.02 10*3/MM3 (ref 0–0.2)
BASOPHILS NFR BLD AUTO: 0.3 % (ref 0–1.5)
BILIRUB SERPL-MCNC: 0.6 MG/DL (ref 0.1–1.2)
BUN BLD-MCNC: 5 MG/DL (ref 6–20)
BUN/CREAT SERPL: 8.8 (ref 7–25)
CALCIUM SPEC-SCNC: 9.2 MG/DL (ref 8.6–10.5)
CHLORIDE SERPL-SCNC: 103 MMOL/L (ref 98–107)
CO2 SERPL-SCNC: 22.7 MMOL/L (ref 22–29)
CREAT BLD-MCNC: 0.57 MG/DL (ref 0.57–1)
CREAT BLDA-MCNC: 0.8 MG/DL (ref 0.6–1.3)
DEPRECATED RDW RBC AUTO: 63.9 FL (ref 37–54)
EOSINOPHIL # BLD AUTO: 0.12 10*3/MM3 (ref 0–0.7)
EOSINOPHIL NFR BLD AUTO: 2 % (ref 0.3–6.2)
ERYTHROCYTE [DISTWIDTH] IN BLOOD BY AUTOMATED COUNT: 15.5 % (ref 11.7–13)
GFR SERPL CREATININE-BSD FRML MDRD: 111 ML/MIN/1.73
GLOBULIN UR ELPH-MCNC: 3 GM/DL
GLUCOSE BLD-MCNC: 90 MG/DL (ref 65–99)
HCT VFR BLD AUTO: 41.1 % (ref 35.6–45.5)
HGB BLD-MCNC: 13.6 G/DL (ref 11.9–15.5)
IMM GRANULOCYTES # BLD: 0 10*3/MM3 (ref 0–0.03)
IMM GRANULOCYTES NFR BLD: 0 % (ref 0–0.5)
LYMPHOCYTES # BLD AUTO: 1.81 10*3/MM3 (ref 0.9–4.8)
LYMPHOCYTES NFR BLD AUTO: 30.5 % (ref 19.6–45.3)
MAGNESIUM SERPL-MCNC: 2.4 MG/DL (ref 1.6–2.6)
MCH RBC QN AUTO: 37.4 PG (ref 26.9–32)
MCHC RBC AUTO-ENTMCNC: 33.1 G/DL (ref 32.4–36.3)
MCV RBC AUTO: 112.9 FL (ref 80.5–98.2)
MONOCYTES # BLD AUTO: 0.62 10*3/MM3 (ref 0.2–1.2)
MONOCYTES NFR BLD AUTO: 10.4 % (ref 5–12)
NEUTROPHILS # BLD AUTO: 3.37 10*3/MM3 (ref 1.9–8.1)
NEUTROPHILS NFR BLD AUTO: 56.8 % (ref 42.7–76)
PHOSPHATE SERPL-MCNC: 3.9 MG/DL (ref 2.5–4.5)
PLATELET # BLD AUTO: 340 10*3/MM3 (ref 140–500)
PMV BLD AUTO: 10.3 FL (ref 6–12)
POTASSIUM BLD-SCNC: 3.7 MMOL/L (ref 3.5–5.2)
PROCALCITONIN SERPL-MCNC: 0.06 NG/ML (ref 0.1–0.25)
PROT SERPL-MCNC: 6.4 G/DL (ref 6–8.5)
RBC # BLD AUTO: 3.64 10*6/MM3 (ref 3.9–5.2)
SODIUM BLD-SCNC: 142 MMOL/L (ref 136–145)
WBC NRBC COR # BLD: 5.94 10*3/MM3 (ref 4.5–10.7)

## 2017-12-06 PROCEDURE — 25010000002 LORAZEPAM PER 2 MG: Performed by: INTERNAL MEDICINE

## 2017-12-06 PROCEDURE — 84145 PROCALCITONIN (PCT): CPT | Performed by: INTERNAL MEDICINE

## 2017-12-06 PROCEDURE — 85025 COMPLETE CBC W/AUTO DIFF WBC: CPT | Performed by: INTERNAL MEDICINE

## 2017-12-06 PROCEDURE — 95816 EEG AWAKE AND DROWSY: CPT | Performed by: PSYCHIATRY & NEUROLOGY

## 2017-12-06 PROCEDURE — 97110 THERAPEUTIC EXERCISES: CPT

## 2017-12-06 PROCEDURE — 99232 SBSQ HOSP IP/OBS MODERATE 35: CPT | Performed by: NURSE PRACTITIONER

## 2017-12-06 PROCEDURE — 84100 ASSAY OF PHOSPHORUS: CPT | Performed by: INTERNAL MEDICINE

## 2017-12-06 PROCEDURE — 92526 ORAL FUNCTION THERAPY: CPT

## 2017-12-06 PROCEDURE — 95819 EEG AWAKE AND ASLEEP: CPT

## 2017-12-06 PROCEDURE — 93005 ELECTROCARDIOGRAM TRACING: CPT | Performed by: INTERNAL MEDICINE

## 2017-12-06 PROCEDURE — 93010 ELECTROCARDIOGRAM REPORT: CPT | Performed by: INTERNAL MEDICINE

## 2017-12-06 PROCEDURE — 97112 NEUROMUSCULAR REEDUCATION: CPT

## 2017-12-06 PROCEDURE — 83735 ASSAY OF MAGNESIUM: CPT | Performed by: INTERNAL MEDICINE

## 2017-12-06 PROCEDURE — 80053 COMPREHEN METABOLIC PANEL: CPT | Performed by: INTERNAL MEDICINE

## 2017-12-06 RX ORDER — TRAZODONE HYDROCHLORIDE 50 MG/1
50 TABLET ORAL NIGHTLY PRN
Status: DISCONTINUED | OUTPATIENT
Start: 2017-12-06 | End: 2017-12-13 | Stop reason: HOSPADM

## 2017-12-06 RX ORDER — LORAZEPAM 2 MG/ML
1 INJECTION INTRAMUSCULAR EVERY 4 HOURS PRN
Status: DISCONTINUED | OUTPATIENT
Start: 2017-12-06 | End: 2017-12-13 | Stop reason: HOSPADM

## 2017-12-06 RX ORDER — LORAZEPAM 2 MG/ML
1 INJECTION INTRAMUSCULAR EVERY 4 HOURS
Status: DISCONTINUED | OUTPATIENT
Start: 2017-12-06 | End: 2017-12-06

## 2017-12-06 RX ORDER — QUETIAPINE FUMARATE 25 MG/1
25 TABLET, FILM COATED ORAL NIGHTLY
Status: DISCONTINUED | OUTPATIENT
Start: 2017-12-07 | End: 2017-12-07

## 2017-12-06 RX ORDER — PANTOPRAZOLE SODIUM 40 MG/1
40 TABLET, DELAYED RELEASE ORAL DAILY
Status: DISCONTINUED | OUTPATIENT
Start: 2017-12-06 | End: 2017-12-13 | Stop reason: HOSPADM

## 2017-12-06 RX ADMIN — ATORVASTATIN CALCIUM 80 MG: 80 TABLET, FILM COATED ORAL at 20:25

## 2017-12-06 RX ADMIN — LORAZEPAM 1 MG: 2 INJECTION, SOLUTION INTRAMUSCULAR; INTRAVENOUS at 05:17

## 2017-12-06 RX ADMIN — HYDROCODONE BITARTRATE AND ACETAMINOPHEN 1 TABLET: 5; 325 TABLET ORAL at 21:04

## 2017-12-06 RX ADMIN — Medication 200 MG: at 08:00

## 2017-12-06 RX ADMIN — NICOTINE 1 PATCH: 21 PATCH, EXTENDED RELEASE TRANSDERMAL at 11:43

## 2017-12-06 RX ADMIN — ROPINIROLE 1 MG: 1 TABLET, FILM COATED ORAL at 18:02

## 2017-12-06 RX ADMIN — FAMOTIDINE 20 MG: 20 TABLET, FILM COATED ORAL at 18:02

## 2017-12-06 RX ADMIN — FAMOTIDINE 20 MG: 20 TABLET, FILM COATED ORAL at 08:00

## 2017-12-06 RX ADMIN — TRAZODONE HYDROCHLORIDE 50 MG: 50 TABLET ORAL at 21:04

## 2017-12-06 RX ADMIN — ASPIRIN 81 MG: 81 TABLET, CHEWABLE ORAL at 08:00

## 2017-12-06 RX ADMIN — CHLORDIAZEPOXIDE HYDROCHLORIDE 50 MG: 25 CAPSULE ORAL at 06:00

## 2017-12-06 RX ADMIN — POTASSIUM CHLORIDE 40 MEQ: 750 CAPSULE, EXTENDED RELEASE ORAL at 00:31

## 2017-12-06 RX ADMIN — QUETIAPINE FUMARATE 25 MG: 25 TABLET, FILM COATED ORAL at 23:18

## 2017-12-06 RX ADMIN — PANTOPRAZOLE SODIUM 40 MG: 40 TABLET, DELAYED RELEASE ORAL at 20:25

## 2017-12-06 RX ADMIN — LORAZEPAM 1 MG: 2 INJECTION, SOLUTION INTRAMUSCULAR; INTRAVENOUS at 03:43

## 2017-12-06 RX ADMIN — AMLODIPINE BESYLATE 10 MG: 10 TABLET ORAL at 08:00

## 2017-12-06 RX ADMIN — LORAZEPAM 1 MG: 2 INJECTION, SOLUTION INTRAMUSCULAR; INTRAVENOUS at 23:02

## 2017-12-06 RX ADMIN — ROPINIROLE 1 MG: 1 TABLET, FILM COATED ORAL at 08:00

## 2017-12-06 RX ADMIN — FOLIC ACID 1 MG: 1 TABLET ORAL at 08:00

## 2017-12-06 NOTE — THERAPY TREATMENT NOTE
"Acute Care - Speech Language Pathology   Swallow Treatment Note [facility]     Patient Name: Angelita Brambila  : 1965  MRN: [id number]  Today's Date: 2017  Onset of Illness/Injury or Date of Surgery Date: 17            Admit Date: 2017    Visit Dx:      ICD-10-CM ICD-9-CM   1. Cerebrovascular accident (CVA), unspecified mechanism I63.9 434.91   2. Hypokalemia E87.6 276.8   3. Hemiparesis affecting left side as late effect of cerebrovascular accident I69.354 438.20     Patient Active Problem List   Diagnosis   • Complete tear of right rotator cuff   • Precordial pain   • Dyspnea on exertion   • Palpitations   • Essential hypertension   • Hyperlipidemia   • Tobacco abuse   • CVA (cerebral vascular accident)             Adult Rehabilitation Note       17 1100 17 0900 17 1300    Rehab Assessment/Intervention    Discipline speech language pathologist  -SA physical therapist  -EE speech language pathologist  -AW    Document Type therapy note (daily note)  -SA therapy note (daily note)  -EE therapy note (daily note)  -AW    Subjective Information agree to therapy  -SA agree to therapy  -EE agree to therapy  -AW    Patient Effort, Rehab Treatment adequate  -SA good  -EE adequate  -AW    Symptoms Noted During/After Treatment fatigue  -SA none  -EE other (see comments)  -AW    Symptoms Noted Comment Nsg stated pt more confused d/t \"detoxing\". Nsg states not a good day for sp/lang/cog eval. Will defer unitl .   -SA  Nursing reported increased confusion. Numerous family members present reporting pt anxious and agitated. Pt stated, \"it's not a good time.\" Pt seen for VFSS f/u and diet tolerance. Will defer cognitive eval today.  -AW    Precautions/Limitations swallowing precautions;fall precautions  -SA fall precautions  -EE fall precautions;swallowing precautions  -AW    Specific Treatment Considerations Posey vest on pt during tx  -SA Posey vest applied; ok for PT per RNSo. " Somewhat lethargic; RN reports pt given ativan overnight  -EE     Patient Response to Treatment tearful  -SA      Recorded by [SA] Heather Amin MS CCC-SLP [EE] Saritha Way, PT [AW] Diane Nelson MS CCC-SLP    Pain Assessment    Pain Assessment  0-10  -EE     Pain Score  4  -EE     Pain Type  Acute pain  -EE     Pain Location  Wrist  -EE     Pain Orientation  Right  -EE     Pain Intervention(s)  Repositioned;Rest  -EE     Response to Interventions  tolerated  -EE     Recorded by  [EE] Saritha Way PT     Cognitive Assessment/Intervention    Current Cognitive/Communication Assessment impaired  -SA impaired  -EE impaired   pt appeared agitated, decreased insight, confused  -AW    Orientation Status oriented to;person;place  -SA oriented to;person  -EE oriented to;person  -AW    Follows Commands/Answers Questions 50% of the time;able to follow single-step instructions;needs cueing;needs increased time;needs repetition  -SA 75% of the time;able to follow single-step instructions;needs cueing;needs repetition  -% of the time;able to follow single-step instructions  -AW    Personal Safety decreased awareness, need for safety;decreased awareness, need for assist;decreased insight to deficits  -SA moderate impairment;at risk behaviors demonstrated;decreased awareness, need for assist;decreased awareness, need for safety;decreased insight to deficits  -EE decreased awareness, need for assist;decreased awareness, need for safety  -AW    Personal Safety Interventions fall prevention program maintained  -SA fall prevention program maintained;gait belt;muscle strengthening facilitated;nonskid shoes/slippers when out of bed;supervised activity  -EE fall prevention program maintained  -AW    Recorded by [SA] Heather Amin MS CCC-SLP [EE] Saritha Way, PT [AW] Diane Nelson MS CCC-SLP    Dysphagia/Swallow Intervention    Dysphagia/Swallow Intervention Pt appears tolerating diet; mech soft/thin; pt w/ moderate dysarthria; confused.  breath sounds clear; po intake fair d/t detoxing per nsg.   -SA  Reviewed results of VFSS with pt and family. Pt and family verbalized understanding of diet and strategies. Pt is tolerating [facility] soft diet, lungs clear.   -AW    Recorded by [SA] Heather Amin MS CCC-SLP  [AW] Diane Nelson MS CCC-SLP    Improve oral skills    To Improve Oral Skills, patient will: Increase lip closure;Increase tongue tip elevation;Increase tongue lateralization;Increase tongue A-P movement;Increase back of tongue control  -SA      Oral Skills Progress following model;with consistent cues  -SA      Comments: Improve Oral Skills Attempted active lingual ex's. Pt not able to complete d/t confusion; however, she was able to tolerate passive oral stretches. Completed buccal and labial stretches w/ good tolerance  -SA      Recorded by [SA] Heather Amin MS CCC-SLP      Improve tongue base & pharyngeal wall squeeze    To improve tongue base & pharyngeal wall squeeze, patient will: Complete tongue hold swallow  -SA      Tongue Base/Pharyngeal Wall Squeeze Progress with consistent cues;0%;following model  -SA      Comments: Improve tongue base & pharyngeal wall squeeze Unable to complete d/t confusion.  -SA      Recorded by [SA] Heather Amin MS CCC-SLP      Bed Mobility, Assessment/Treatment    Bed Mobility, Assistive Device  head of bed elevated;bed rails  -EE     Bed Mob, Supine to Sit, [place]  contact guard assist;minimum assist (75% patient effort);verbal cues required  -EE     Bed Mob, Sit to Supine, [place]  minimum assist (75% patient effort);verbal cues required  -EE     Bed Mobility, Impairments  strength decreased;coordination impaired  -EE     Recorded by  [EE] Saritha Way, PT     Transfer Assessment/Treatment    Transfers, Sit-Stand [place]  contact guard assist;minimum assist (75% patient effort);verbal cues required;nonverbal cues required (demo/gesture);hand held assist;1 person + 1 person to manage equipment  -EE      Transfers, Stand-Sit [place]  contact guard assist;verbal cues required  -EE     Transfer, Safety Issues  balance decreased during turns;weight-shifting ability decreased  -EE     Transfer, Impairments  strength decreased;impaired balance;coordination impaired;motor control impaired  -EE     Recorded by  [EE] Saritha Way, PT     Gait Assessment/Treatment    Gait, [place] Level  contact guard assist;minimum assist (75% patient effort);verbal cues required;nonverbal cues required (demo/gesture);hand held assist;1 person + 1 person to manage equipment  -EE     Gait, Distance (Feet)  400  -EE     Gait, Gait Deviations  ronnell decreased;step length decreased;stride length decreased;weight-shifting ability decreased;forward flexed posture  -EE     Gait, Safety Issues  step length decreased;balance decreased during turns  -EE     Gait, Impairments  strength decreased;impaired balance;coordination impaired;impaired vision;motor control impaired  -EE     Gait, Comment  Verbal cues to scan to L and avoid obstacles on L. Increased forward flexed posture today; cues for upright posture.  -EE     Recorded by  [EE] Saritha Way, PT     Positioning and Restraints    Pre-Treatment Position  in bed  -EE     Post Treatment Position  bed  -EE     In Bed  fowlers;call light within reach;encouraged to call for assist;exit alarm on;with nsg  -EE     Restraints  reapplied:;vest  -EE     Recorded by  [EE] Saritha Way, PT       12/05/17 1104 12/04/17 1437       Rehab Assessment/Intervention    Discipline physical therapist  -EE physical therapist  -EE     Document Type therapy note (daily note)  -EE therapy note (daily note)  -EE     Subjective Information agree to therapy;no complaints  -EE agree to therapy;complains of;pain  -EE     Patient Effort, Rehab Treatment good  -EE good  -EE     Symptoms Noted During/After Treatment none  -EE increased pain;other (see comments)   headache  -EE     Symptoms Noted Comment  Pt reported  "\"I think my headache is coming back\" following activity. RNJyothi, at bedside and aware.  -EE     Precautions/Limitations fall precautions;other (see comments)   possible R wrist fx  -EE fall precautions;other (see comments)   possible R wrist fx; has not been splinted yet  -EE     Specific Treatment Considerations  Potential R wrist fx that has not been splinted; assisted pt by holding R elbow/forearm and maintained NWB on R hand/wrist as a precaution  -EE     Recorded by [EE] Saritha Way PT [EE] Saritha Way PT     Pain Assessment    Pain Assessment 0-10  -EE 0-10  -EE     Pain Score 3  -EE 5  -EE     Pain Type Chronic pain  -EE Acute pain  -EE     Pain Location Back  -EE Head   also R wrist soreness from suspected fx  -EE     Pain Orientation Lower  -EE      Pain Descriptors  Aching;Sore  -EE     Pain Intervention(s) Repositioned;Ambulation/increased activity  -EE Repositioned;Rest  -EE     Response to Interventions tolerated  -EE tolerated  -EE     Recorded by [EE] Saritha Way PT [EE] Saritha Way PT     Vision Assessment/Intervention    Visual Impairment inattention to the left;left neglect;needs cues to attend visually  -EE inattention to the left;left neglect;needs cues to attend visually  -EE     Visual Impairment Comment  Demonstrates preference for R gaze, able to turn head to midline and to L with cueing  -EE     Recorded by [EE] Saritha Way PT [EE] Saritha aWy PT     Cognitive Assessment/Intervention    Current Cognitive/Communication Assessment impaired  -EE functional  -EE     Orientation Status oriented to;person;place  -EE oriented x 4  -EE     Follows Commands/Answers Questions 100% of the time;able to follow single-step instructions;needs cueing;needs increased time;needs repetition  -% of the time  -EE     Personal Safety mild impairment;at risk behaviors demonstrated;decreased awareness, need for safety;decreased insight to deficits  -EE mild impairment;decreased insight to deficits  " -EE     Personal Safety Interventions fall prevention program maintained;gait belt;nonskid shoes/slippers when out of bed;supervised activity  -EE fall prevention program maintained;gait belt;muscle strengthening facilitated;nonskid shoes/slippers when out of bed;supervised activity  -EE     Recorded by [EE] Saritha Way PT [EE] Saritha Way PT     Bed Mobility, Assessment/Treatment    Bed Mobility, Assistive Device  bed rails;head of bed elevated  -EE     Bed Mob, Supine to Sit, [place] not tested  -EE contact guard assist;verbal cues required  -EE     Bed Mob, Sit to Supine, [place] not tested  -EE supervision required  -EE     Bed Mobility, Impairments  strength decreased;pain;motor control impaired;coordination impaired  -EE     Bed Mobility, Comment up in room with nsg staff, then up in chair  -EE      Recorded by [EE] Saritha Way PT [EE] Saritha Way PT     Transfer Assessment/Treatment    Transfers, Sit-Stand [place] contact guard assist;verbal cues required  -EE contact guard assist;verbal cues required;nonverbal cues required (demo/gesture);hand held assist  -EE     Transfers, Stand-Sit [place] contact guard assist;verbal cues required  -EE contact guard assist;verbal cues required;hand held assist  -EE     Transfer, Safety Issues balance decreased during turns  -EE step length decreased;balance decreased during turns  -EE     Transfer, Impairments strength decreased;impaired balance;coordination impaired  -EE strength decreased;impaired balance;coordination impaired;motor control impaired;pain  -EE     Transfer, Comment cues to scan to L during turns to avoid obstacles  -EE      Recorded by [EE] Saritha Way PT [EE] Saritha Way PT     Gait Assessment/Treatment    Gait, [place] Level contact guard assist;minimum assist (75% patient effort);verbal cues required  -EE contact guard assist;minimum assist (75% patient effort);verbal cues required;nonverbal cues required  (demo/gesture);hand held assist  -EE     Gait, Distance (Feet) 400  -  -EE     Gait, Gait Deviations ronnell decreased;narrow base;step length decreased;stride length decreased  -EE ronnell decreased;leans to the left;ataxia;narrow base;weight-shifting ability decreased;step length decreased  -EE     Gait, Safety Issues step length decreased;balance decreased during turns  -EE step length decreased;weight-shifting ability decreased;balance decreased during turns  -EE     Gait, Impairments strength decreased;impaired balance;impaired vision;coordination impaired  -EE strength decreased;impaired balance;coordination impaired;impaired vision;motor control impaired  -EE     Gait, Comment Cueing required to scan to L and avoid obstacles on L side. Able to ambulate and maintain balance w/o assist at R UE today.   -EE Pt assisted by holding R elbow; avoided weightbearing on R hand/wrist. Verbal cues to scan to L and avoid obstacles on L sided during ambulation.  -EE     Recorded by [EE] Saritha Way PT [EE] Saritha Way PT     Positioning and Restraints    Pre-Treatment Position in bed  -EE in bed  -EE     Post Treatment Position chair  -EE bed  -EE     In Bed  fowlers;call light within reach;encouraged to call for assist;exit alarm on;with family/caregiver;notified nsg;SCD pump applied;LUE elevated  -EE     In Chair sitting;call light within reach;encouraged to call for assist;exit alarm on;notified nsg  -EE      Recorded by [EE] Saritha Way PT [EE] Saritha Way PT       User Key  (r) = Recorded By, (t) = Taken By, (c) = Cosigned By    Initials Name Effective Dates    AW Diane Nelson, MS CCC-SLP 04/13/15 -     EE Saritha Way, PT 12/01/15 -     SA Heather Amin, MS CCC-SLP 07/25/17 -                   IP SLP Goals       12/06/17 1113 12/04/17 0915 12/02/17 1446    Safely Consume Diet    Safely Consume Diet- SLP, Date Established   12/02/17  -CP    Safely Consume Diet- SLP, Time to Achieve   by discharge  -CP    Safely  Consume Diet- SLP, Activity Level Patient will improve oral skills for more efficient eating;Patient will improve hyolaryngeal excursion for safer, more efficient swallow;Patient will improve tongue base/pharyngeal wall squeeze for safer, more efficient swallow  -      Safely Consume Diet- SLP, Date Goal Reviewed 12/06/17  -      Safely Consume Diet- SLP, Outcome goal ongoing  - goal ongoing  -       User Key  (r) = Recorded By, (t) = Taken By, (c) = Cosigned By    Initials Name Provider Type    MALIKA Park, MS CCC-SLP Speech and Language Pathologist     Beronica Devi, MS CCC-SLP Speech and Language Pathologist    SA Heather Amin, MS CCC-SLP Speech and Language Pathologist          EDUCATION  The patient has been educated in the following areas:   Dysphagia (Swallowing Impairment) Oral Care/Hydration Modified Diet Instruction.    SLP Recommendation and Plan     SLP Diet Recommendation: soft textures, thin liquids   Pt will need speech therapy at next level of care.                        Therapy Frequency: PRN ; until D/C                       SLP Outcome Measures (last 72 hours)      SLP Outcome Measures       12/06/17 1100 12/04/17 0900       SLP Outcome Measures    Outcome Measure Used?  Adult NOMS  -     FCM Scores    FCM Chosen Swallowing  - Swallowing  -     Swallowing FCM Score  5  -       User Key  (r) = Recorded By, (t) = Taken By, (c) = Cosigned By    Initials Name Effective Dates     Beronica WONG Marito, MS CCC-SLP 07/13/17 -      Heather Amin MS CCC-SLP 07/25/17 -              Time Calculation:         Time Calculation- SLP       12/06/17 1115          Time Calculation- [facility]    SLP Start Time 1015  -      SLP Stop Time 1100  -      SLP Time Calculation (min) 45 min  -      SLP Received On 12/06/17  -        User Key  (r) = Recorded By, (t) = Taken By, (c) = Cosigned By    Initials Name Provider Type    SA Heather Amin MS CCC-SLP Speech and Language Pathologist          Therapy  Charges for Today     Code Description Service Date Service Provider Modifiers Qty    [id number] HC ST TREATMENT SWALLOW 3 12/6/2017 Heather Amin MS CCC-SLP GN 1                 Heather Amin MS CCC-SLP  12/6/2017

## 2017-12-06 NOTE — PLAN OF CARE
Problem: Inpatient SLP  Goal: Dysphagia- Patient will safely consume diet as per recommendation with no signs/symptoms of aspiration    12/06/17 1113   Safely Consume Diet   Safely Consume Diet- SLP, Activity Level Patient will improve oral skills for more efficient eating;Patient will improve hyolaryngeal excursion for safer, more efficient swallow;Patient will improve tongue base/pharyngeal wall squeeze for safer, more efficient swallow   Safely Consume Diet- SLP, Date Goal Reviewed 12/06/17   Safely Consume Diet- SLP, Outcome goal ongoing

## 2017-12-06 NOTE — PROGRESS NOTES
Dr. JUANA Mathews    [address]    12/6/2017    Patient ID:  Name:  Angelita Brambila  MRN:  [id number]  1965  52 y.o.  female            CC/Reason for visit: Follow-up for alcohol withdrawal, stroke    HPI: Patient is feeling much better.  She is joking around and laughing.  She is completely oriented again    Vitals:  Vitals:    12/06/17 0757 12/06/17 1141 12/06/17 1422 12/06/17 1715   BP: 135/93 139/95 129/77 136/79   BP Location: Left arm Left arm Right arm Right arm   Patient Position: Lying Lying Lying Lying   Pulse: 104 111 94 107   Resp: 17 16 18 18   Temp: 98.3 °F (36.8 °C)  98.5 °F (36.9 °C) 98.1 °F (36.7 °C)   TempSrc: Oral  Oral Oral   SpO2: 97% 98% 100% 95%   Weight:       Height:               Body mass index is 24.3 kg/(m^2).  No intake or output data in the 24 hours ending 12/06/17 1853    Exam:  GEN:  Awake, alert, eating with her right hand.  Oriented and joking around.  Left hemiplegia is still present  LUNGS: Clear breath sounds bilat, nonlabored breathing  CV:  Normal S1S2, without murmur, no edema  ABD:  Non tender, no enlarged liver or masses  EXT:  No cyanosis or clubbing    Scheduled meds:    amLODIPine 10 mg Oral Daily   aspirin 81 mg Oral Daily   atorvastatin 80 mg Oral Nightly   famotidine 20 mg Oral BID   folic acid 1 mg Oral Daily   nicotine 1 patch Transdermal Q24H   pantoprazole 40 mg Oral Daily   QUEtiapine 25 mg Oral Nightly   rOPINIRole 1 mg Oral BID   thiamine 200 mg Oral Daily     IV meds:                        sodium chloride 75 mL/hr Last Rate: 75 mL/hr (12/05/17 0840)       Data Review:   I reviewed the patient's medications and new clinical results.      Results from last 7 days  Lab Units 12/06/17  0444 12/05/17  2151 12/04/17  0609 12/04/17  0406 12/03/17  0521  12/01/17  1537   SODIUM mmol/L 142  --   --  138 137  --  142   POTASSIUM mmol/L 3.7 3.3*  --  3.5 3.9  < > 2.9*   CHLORIDE mmol/L 103  --   --  103 100  --  98   CO2 mmol/L 22.7  --   --   22.4 23.8  --  30.0*   BUN mg/dL 5*  --   --  6 6  --  5*   CREATININE mg/dL 0.57  --   --  0.57 0.62  < > 0.75   CALCIUM mg/dL 9.2  --   --  7.9* 7.9*  --  8.9   BILIRUBIN mg/dL 0.6  --   --  0.6  --   --  0.8   ALK PHOS U/L 92  --   --  70  --   --  88   ALT (SGPT) U/L 31  --   --  13  --   --  24   AST (SGOT) U/L 61*  --   --  19  --   --  26   GLUCOSE mg/dL 90  --   --  93 105*  --  106*   WBC 10*3/mm3 5.94  --  6.91  --   --   --  9.02   HEMOGLOBIN g/dL 13.6  --  12.3  --   --   --  16.4*   PLATELETS 10*3/mm3 340  --  259  --   --   --  300   INR   --   --   --  1.04  --   --  0.96   PROBNP pg/mL  --   --   --  313.8  --   --   --    PROCALCITONIN ng/mL 0.06*  --   --   --   --   --   --    < > = values in this interval not displayed.          Latest CT of the head performed yesterday  IMPRESSION:  1. Evolving hemorrhagic right MCA distribution infarct with associated  mass effect which has increased slightly since the previous examination.  No new hemorrhage is appreciated.       ASSESSMENT:   Acute alcohol withdrawal  CVA (cerebral vascular accident)  Oropharyngeal dysphagia  Left hemiparesis  Hypertension  Hypokalemia  Hemorrhagic transformation of CVA  Compression of brain    PLAN:  The patient is doing much better.  I will resume trazodone at night only if she asks for it.  Resume omeprazole.  No other home medications will resume that at this time.    Repeat CT does show an evolving right hemorrhagic stroke with some increased mass effect.    Neurology is recommending starting aspirin and performing transesophageal echocardiogram.  They are also recommending daily transcranial Doppler measurements for ruling out emboli.    Continue physical therapy.  Based on how functional this patient has, I would like to discharge or Friday, but it seems like current workup ordered by neurology will probably keep her here over the weekend.        Fadi Mathews MD  12/6/2017

## 2017-12-06 NOTE — PROGRESS NOTES
Discharge Planning Assessment  [facility]     Patient Name: Angelita Brambila  MRN: [id number]  Today's Date: 12/6/2017    Admit Date: 12/1/2017      Discharge Plan       12/06/17 1257    Case Management/Social Work Plan    Additional Comments Call rec'd from Letty Renner (dgt) and obtained referrals in [address] [place] [address] per request.  4 referrals made in epic and [facility] to follow-up on bed availability and update daughter.  Precert will be required and dgt aware she will need to decide on her first choice.          Discharge Placement     Facility/Agency Request Status Selected? Address Phone Number Fax Number    [facility] Pending - Request Sent     [address] [facility] [address] [phone]    [facility] Pending - Request Sent     [address] [facility] [address] [phone]    [facility] Pending - Request Sent     [address] [facility] [address] [phone]    [facility]     [address] [facility] [address] [phone]        Expected Discharge Date and Time     Expected Discharge Date Expected Discharge Time    Dec 8, 2017                         Jessica Ward RN

## 2017-12-06 NOTE — PLAN OF CARE
Problem: Patient Care Overview (Adult)  Goal: Plan of Care Review  Outcome: Ongoing (interventions implemented as appropriate)    12/06/17 1502   Coping/Psychosocial Response Interventions   Plan Of Care Reviewed With patient   Outcome Evaluation   Outcome Summary/Follow up Plan Pt lethargic and confused requiring cues to attend and open eyes during treatment. Pt improving LUE ROM and motor control but still slow processing and is limiting factor. However, able to follow commands for ROM and GMC activities with increased time. Will continue to follow.

## 2017-12-06 NOTE — THERAPY TREATMENT NOTE
"Acute Care - Occupational Therapy Treatment Note  [facility]     Patient Name: Angelita Brambila  : 1965  MRN: [id number]  Today's Date: 2017  Onset of Illness/Injury or Date of Surgery Date: 17     Referring Physician: Rajendra      Admit Date: 2017    Visit Dx:     ICD-10-CM ICD-9-CM   1. Cerebrovascular accident (CVA), unspecified mechanism I63.9 434.91   2. Hypokalemia E87.6 276.8   3. Hemiparesis affecting left side as late effect of cerebrovascular accident I69.354 438.20     Patient Active Problem List   Diagnosis   • Complete tear of right rotator cuff   • Precordial pain   • Dyspnea on exertion   • Palpitations   • Essential hypertension   • Hyperlipidemia   • Tobacco abuse   • CVA (cerebral vascular accident)             Adult Rehabilitation Note       17 1400 17 1100 17 0900    Rehab Assessment/Intervention    Discipline occupational therapist  -HC speech language pathologist  -SA physical therapist  -EE    Document Type therapy note (daily note)  -HC therapy note (daily note)  -SA therapy note (daily note)  -EE    Subjective Information agree to therapy  -HC agree to therapy  -SA agree to therapy  -EE    Patient Effort, Rehab Treatment good  -HC adequate  -SA good  -EE    Symptoms Noted During/After Treatment none  -HC fatigue  -SA none  -EE    Symptoms Noted Comment Pt still confused using remote as cell phone and searching for cigarrettes.  -HC Nsg stated pt more confused d/t \"detoxing\". Nsg states not a good day for sp/lang/cog eval. Will defer unitl .   -SA     Precautions/Limitations fall precautions  -HC swallowing precautions;fall precautions  -SA fall precautions  -EE    Specific Treatment Considerations Posey vest on upon arrival, RN confirmed okay to take off during tx and to reapply either in bed or chair.  -HC Posey vest on pt during tx  -SA Posey vest applied; ok for PT per So PABLO. Somewhat lethargic; RN reports pt given ativan overnight  -EE "    Patient Response to Treatment  tearful  -SA     Recorded by [HC] BRANT Hicks [SA] Heather Amin MS CCC-SLP [EE] Saritha Way, SHAYY    Pain Assessment    Pain Assessment No/denies pain  -HC  0-10  -EE    Pain Score   4  -EE    Pain Type   Acute pain  -EE    Pain Location   Wrist  -EE    Pain Orientation   Right  -EE    Pain Intervention(s)   Repositioned;Rest  -EE    Response to Interventions   tolerated  -EE    Recorded by [HC] BRANT Hicks  [EE] Saritha Way, SHAYY    Vision Assessment/Intervention    Visual Impairment inattention to the left;left neglect;needs cues to attend visually  -HC      Recorded by [HC] BRANT Hicks      Cognitive Assessment/Intervention    Current Cognitive/Communication Assessment impaired  -HC impaired  -SA impaired  -EE    Orientation Status oriented to;person  -HC oriented to;person;place  -SA oriented to;person  -EE    Follows Commands/Answers Questions 50% of the time;able to follow single-step instructions;needs cueing;needs increased time;needs repetition  -HC 50% of the time;able to follow single-step instructions;needs cueing;needs increased time;needs repetition  -SA 75% of the time;able to follow single-step instructions;needs cueing;needs repetition  -EE    Personal Safety moderate impairment;at risk behaviors demonstrated;decreased awareness, need for assist;decreased awareness, need for safety;decreased insight to deficits;impulsive  -HC decreased awareness, need for safety;decreased awareness, need for assist;decreased insight to deficits  -SA moderate impairment;at risk behaviors demonstrated;decreased awareness, need for assist;decreased awareness, need for safety;decreased insight to deficits  -EE    Personal Safety Interventions fall prevention program maintained;gait belt;nonskid shoes/slippers when out of bed  -HC fall prevention program maintained  -SA fall prevention program maintained;gait belt;muscle strengthening facilitated;nonskid shoes/slippers  when out of bed;supervised activity  -EE    Recorded by [HC] Vida Mcgarry, OTR [SA] Heather Amin MS CCC-SLP [EE] Saritha Way, PT    Dysphagia/Swallow Intervention    Dysphagia/Swallow Intervention  Pt appears tolerating diet; mech soft/thin; pt w/ moderate dysarthria; confused. breath sounds clear; po intake fair d/t detoxing per nsg.   -SA     Recorded by  [SA] Heather Amin MS CCC-SLP     Improve oral skills    To Improve Oral Skills, patient will:  Increase lip closure;Increase tongue tip elevation;Increase tongue lateralization;Increase tongue A-P movement;Increase back of tongue control  -SA     Oral Skills Progress  following model;with consistent cues  -SA     Comments: Improve Oral Skills  Attempted active lingual ex's. Pt not able to complete d/t confusion; however, she was able to tolerate passive oral stretches. Completed buccal and labial stretches w/ good tolerance  -SA     Recorded by  [SA] Heather Amin MS CCC-SLP     Improve tongue base & pharyngeal wall squeeze    To improve tongue base & pharyngeal wall squeeze, patient will:  Complete tongue hold swallow  -SA     Tongue Base/Pharyngeal Wall Squeeze Progress  with consistent cues;0%;following model  -SA     Comments: Improve tongue base & pharyngeal wall squeeze  Unable to complete d/t confusion.  -SA     Recorded by  [SA] Heather Amin MS CCC-SLP     Bed Mobility, Assessment/Treatment    Bed Mobility, Assistive Device head of bed elevated;bed rails  -HC  head of bed elevated;bed rails  -EE    Bed Mob, Supine to Sit, [place] contact guard assist;minimum assist (75% patient effort);verbal cues required;set up required  -HC  contact guard assist;minimum assist (75% patient effort);verbal cues required  -EE    Bed Mob, Sit to Supine, [place] minimum assist (75% patient effort);verbal cues required  -HC  minimum assist (75% patient effort);verbal cues required  -EE    Bed Mobility, Impairments   strength decreased;coordination impaired  -EE     Recorded by [HC] BRANT Hicks  [EE] Saritha Way PT    Transfer Assessment/Treatment    Transfers, Sit-Stand [place] contact guard assist;verbal cues required  -HC  contact guard assist;minimum assist (75% patient effort);verbal cues required;nonverbal cues required (demo/gesture);hand held assist;1 person + 1 person to manage equipment  -EE    Transfers, Stand-Sit [place] contact guard assist;verbal cues required  -HC  contact guard assist;verbal cues required  -EE    Transfer, Safety Issues   balance decreased during turns;weight-shifting ability decreased  -EE    Transfer, Impairments   strength decreased;impaired balance;coordination impaired;motor control impaired  -EE    Transfer, Comment VCs to open eyes, very lethargic  -HC      Recorded by [HC] BRANT Hicks  [EE] Saritha Way PT    Gait Assessment/Treatment    Gait, [place] Level   contact guard assist;minimum assist (75% patient effort);verbal cues required;nonverbal cues required (demo/gesture);hand held assist;1 person + 1 person to manage equipment  -EE    Gait, Distance (Feet)   400  -EE    Gait, Gait Deviations   ronnell decreased;step length decreased;stride length decreased;weight-shifting ability decreased;forward flexed posture  -EE    Gait, Safety Issues   step length decreased;balance decreased during turns  -EE    Gait, Impairments   strength decreased;impaired balance;coordination impaired;impaired vision;motor control impaired  -EE    Gait, Comment   Verbal cues to scan to L and avoid obstacles on L. Increased forward flexed posture today; cues for upright posture.  -EE    Recorded by   [EE] Saritha Way PT    Functional Mobility    Functional Mobility- Ind. Level contact guard assist;minimum assist (75% patient effort)  -HC      Functional Mobility- Device other (see comments)   HHA  -HC      Functional Mobility-Distance (Feet) --   around room from bed and back  -HC      Recorded by [HC] Vida Mcgarry OTR       "Therapy Exercises    Left Upper Extremity AROM:;10 reps;supine;elbow flexion/extension;hand pumps;pronation/supination;shoulder extension/flexion   shoulder 3/4 range with scaption; slow motor control  -HC      Recorded by [HC] BRANT Hicks      Positioning and Restraints    Pre-Treatment Position in bed  -HC  in bed  -EE    Post Treatment Position bed  -HC  bed  -EE    In Bed supine;call light within reach;encouraged to call for assist;exit alarm on  -HC  fowlers;call light within reach;encouraged to call for assist;exit alarm on;with nsg  -EE    Restraints reapplied:;vest  -HC  reapplied:;vest  -EE    Recorded by [HC] BRANT Hicks  [EE] Saritha Way, PT      12/05/17 1300 12/05/17 1104 12/04/17 1437    Rehab Assessment/Intervention    Discipline speech language pathologist  -AW physical therapist  -EE physical therapist  -EE    Document Type therapy note (daily note)  -AW therapy note (daily note)  -EE therapy note (daily note)  -EE    Subjective Information agree to therapy  -AW agree to therapy;no complaints  -EE agree to therapy;complains of;pain  -EE    Patient Effort, Rehab Treatment adequate  -AW good  -EE good  -EE    Symptoms Noted During/After Treatment other (see comments)  -AW none  -EE increased pain;other (see comments)   headache  -EE    Symptoms Noted Comment Nursing reported increased confusion. Numerous family members present reporting pt anxious and agitated. Pt stated, \"it's not a good time.\" Pt seen for VFSS f/u and diet tolerance. Will defer cognitive eval today.  -AW  Pt reported \"I think my headache is coming back\" following activity. RNJyothi, at bedside and aware.  -EE    Precautions/Limitations fall precautions;swallowing precautions  -AW fall precautions;other (see comments)   possible R wrist fx  -EE fall precautions;other (see comments)   possible R wrist fx; has not been splinted yet  -EE    Specific Treatment Considerations   Potential R wrist fx that has not been " splinted; assisted pt by holding R elbow/forearm and maintained NWB on R hand/wrist as a precaution  -EE    Recorded by [AW] Diane Nelson MS CCC-SLP [EE] Saritha Way PT [EE] Saritha Way PT    Pain Assessment    Pain Assessment  0-10  -EE 0-10  -EE    Pain Score  3  -EE 5  -EE    Pain Type  Chronic pain  -EE Acute pain  -EE    Pain Location  Back  -EE Head   also R wrist soreness from suspected fx  -EE    Pain Orientation  Lower  -EE     Pain Descriptors   Aching;Sore  -EE    Pain Intervention(s)  Repositioned;Ambulation/increased activity  -EE Repositioned;Rest  -EE    Response to Interventions  tolerated  -EE tolerated  -EE    Recorded by  [EE] Saritha Way PT [EE] Saritha Way PT    Vision Assessment/Intervention    Visual Impairment  inattention to the left;left neglect;needs cues to attend visually  -EE inattention to the left;left neglect;needs cues to attend visually  -EE    Visual Impairment Comment   Demonstrates preference for R gaze, able to turn head to midline and to L with cueing  -EE    Recorded by  [EE] Saritha Way PT [EE] Saritha Way PT    Cognitive Assessment/Intervention    Current Cognitive/Communication Assessment impaired   pt appeared agitated, decreased insight, confused  -AW impaired  -EE functional  -EE    Orientation Status oriented to;person  -AW oriented to;person;place  -EE oriented x 4  -EE    Follows Commands/Answers Questions 100% of the time;able to follow single-step instructions  -% of the time;able to follow single-step instructions;needs cueing;needs increased time;needs repetition  -% of the time  -EE    Personal Safety decreased awareness, need for assist;decreased awareness, need for safety  -AW mild impairment;at risk behaviors demonstrated;decreased awareness, need for safety;decreased insight to deficits  -EE mild impairment;decreased insight to deficits  -EE    Personal Safety Interventions fall prevention program maintained  -AW fall prevention program  maintained;gait belt;nonskid shoes/slippers when out of bed;supervised activity  -EE fall prevention program maintained;gait belt;muscle strengthening facilitated;nonskid shoes/slippers when out of bed;supervised activity  -EE    Recorded by [AW] Diane Nelson MS CCC-SLP [EE] Saritha Way PT [EE] Saritha Way PT    Dysphagia/Swallow Intervention    Dysphagia/Swallow Intervention Reviewed results of VFSS with pt and family. Pt and family verbalized understanding of diet and strategies. Pt is tolerating [facility] soft diet, lungs clear.   -AW      Recorded by [AW] Diane Nelson MS CCC-SLP      Bed Mobility, Assessment/Treatment    Bed Mobility, Assistive Device   bed rails;head of bed elevated  -EE    Bed Mob, Supine to Sit, [place]  not tested  -EE contact guard assist;verbal cues required  -EE    Bed Mob, Sit to Supine, [place]  not tested  -EE supervision required  -EE    Bed Mobility, Impairments   strength decreased;pain;motor control impaired;coordination impaired  -EE    Bed Mobility, Comment  up in room with nsg staff, then up in chair  -EE     Recorded by  [EE] Saritha Way PT [EE] Saritha Way PT    Transfer Assessment/Treatment    Transfers, Sit-Stand [place]  contact guard assist;verbal cues required  -EE contact guard assist;verbal cues required;nonverbal cues required (demo/gesture);hand held assist  -EE    Transfers, Stand-Sit [place]  contact guard assist;verbal cues required  -EE contact guard assist;verbal cues required;hand held assist  -EE    Transfer, Safety Issues  balance decreased during turns  -EE step length decreased;balance decreased during turns  -EE    Transfer, Impairments  strength decreased;impaired balance;coordination impaired  -EE strength decreased;impaired balance;coordination impaired;motor control impaired;pain  -EE    Transfer, Comment  cues to scan to L during turns to avoid obstacles  -EE     Recorded by  [EE] Saritha Way PT [EE] Saritha Way PT    Gait  Assessment/Treatment    Gait, [place] Level  contact guard assist;minimum assist (75% patient effort);verbal cues required  -EE contact guard assist;minimum assist (75% patient effort);verbal cues required;nonverbal cues required (demo/gesture);hand held assist  -EE    Gait, Distance (Feet)  400  -  -EE    Gait, Gait Deviations  ronnell decreased;narrow base;step length decreased;stride length decreased  -EE ronnell decreased;leans to the left;ataxia;narrow base;weight-shifting ability decreased;step length decreased  -EE    Gait, Safety Issues  step length decreased;balance decreased during turns  -EE step length decreased;weight-shifting ability decreased;balance decreased during turns  -EE    Gait, Impairments  strength decreased;impaired balance;impaired vision;coordination impaired  -EE strength decreased;impaired balance;coordination impaired;impaired vision;motor control impaired  -EE    Gait, Comment  Cueing required to scan to L and avoid obstacles on L side. Able to ambulate and maintain balance w/o assist at R UE today.   -EE Pt assisted by holding R elbow; avoided weightbearing on R hand/wrist. Verbal cues to scan to L and avoid obstacles on L sided during ambulation.  -EE    Recorded by  [EE] Saritha Way PT [EE] Saritha Way PT    Positioning and Restraints    Pre-Treatment Position  in bed  -EE in bed  -EE    Post Treatment Position  chair  -EE bed  -EE    In Bed   fowlers;call light within reach;encouraged to call for assist;exit alarm on;with family/caregiver;notified nsg;SCD pump applied;LUE elevated  -EE    In Chair  sitting;call light within reach;encouraged to call for assist;exit alarm on;notified nsg  -EE     Recorded by  [EE] Saritha Way PT [EE] Saritha Way PT      User Key  (r) = Recorded By, (t) = Taken By, (c) = Cosigned By    Initials Name Effective Dates    AW Diane Nelson MS CCC-SLP 04/13/15 -     EE Saritha Way PT 12/01/15 -     SA Heather Amin, MS CCC-SLP 07/25/17 -       BRANT Hicks 08/17/17 -                 OT Goals       12/05/17 1223          Transfer Training OT LTG    Transfer Training OT LTG, Date Established 12/05/17  -HC      Transfer Training OT LTG, Time to Achieve 1 wk  -HC      Transfer Training OT LTG, Activity Type bed to chair /chair to bed;toilet;sit to stand/stand to sit  -HC      Transfer Training OT LTG, [place] Level supervision required  -HC      Coordination OT LTG    Coordination OT LTG, Date Established 12/05/17  -HC      Coordination OT LTG, Time to Achieve 1 wk  -HC      Coordination OT LTG, Activity Type FM task;GM task  -HC      Coordination OT LTG, [place] Level min assist;with verbal cues;setup  -HC      ADL OT LTG    ADL OT LTG, Date Established 12/05/17  -HC      ADL OT LTG, Time to Achieve 1 wk  -HC      ADL OT LTG, Activity Type ADL skills  -HC      ADL OT LTG, [place] Level min assist;contact guard  -HC      Endurance OT LTG    Endurance Goal OT LTG, Date Established 12/05/17  -HC      Endurance Goal OT LTG, Time to Achieve 1 wk  -HC      Endurance Goal OT LTG, Activity Level endurance 2 fair+  -HC        User Key  (r) = Recorded By, (t) = Taken By, (c) = Cosigned By    Initials Name Provider Type     BRANT Hicks Occupational Therapist          Occupational Therapy Education     Title: PT OT SLP Therapies (Active)     Topic: Occupational Therapy (Active)     Point: ADL training (Active)    Description: Instruct learner(s) on proper safety adaptation and remediation techniques during self care or transfers.   Instruct in proper use of assistive devices.    Learning Progress Summary    Learner Readiness Method Response Comment Documented by Status   Patient Kirby OROPEZA   12/05/17 1222 Active               Point: Precautions (Active)    Description: Instruct learner(s) on prescribed precautions during self-care and functional transfers.    Learning Progress Summary    Learner Readiness Method Response Comment  Documented by Status   Patient Kirby OROPEZA   12/05/17 1222 Active               Point: Body mechanics (Active)    Description: Instruct learner(s) on proper positioning and spine alignment during self-care, functional mobility activities and/or exercises.    Learning Progress Summary    Learner Readiness Method Response Comment Documented by Status   Patient Kirby OROPZEA   12/05/17 1222 Active                      User Key     Initials Effective Dates Name Provider Type Discipline     08/17/17 -  BRANT Hicks Occupational Therapist OT                  OT Recommendation and Plan  Anticipated Discharge Disposition: inpatient rehabilitation facility  Planned Therapy Interventions: activity intolerance, ADL retraining, IADL retraining, balance training, adaptive equipment training, bed mobility training, transfer training, stretching, strengthening, ROM (Range of Motion)  Therapy Frequency: 3-5 times/wk  Plan of Care Review  Plan Of Care Reviewed With: patient  Outcome Summary/Follow up Plan: Pt lethargic and confused requiring cues to attend and open eyes during treatment. Pt improving LUE ROM and motor control but still slow processing and is limiting factor. However, able to follow commands for ROM and GMC activities with increased time. Will continue to follow.        Outcome Measures       12/06/17 1500 12/06/17 0900 12/05/17 1200    How much help from another person do you currently need...    Turning from your back to your side while in flat bed without using bedrails?  3  -EE     Moving from lying on back to sitting on the side of a flat bed without bedrails?  3  -EE     Moving to and from a bed to a chair (including a wheelchair)?  3  -EE     Standing up from a chair using your arms (e.g., wheelchair, bedside chair)?  3  -EE     Climbing 3-5 steps with a railing?  2  -EE     To walk in hospital room?  3  -EE     AM-PAC 6 Clicks Score  17  -EE     How much help from another is currently needed...     Putting on and taking off regular lower body clothing? 3  -HC  3  -HC    Bathing (including washing, rinsing, and drying) 3  -HC  3  -HC    Toileting (which includes using toilet bed pan or urinal) 3  -HC  3  -HC    Putting on and taking off regular upper body clothing 3  -HC  3  -HC    Taking care of personal grooming (such as brushing teeth) 3  -HC  3  -HC    Eating meals 3  -HC  3  -HC    Score 18  -HC  18  -HC    Modified [place] Scale    Modified [place] Scale   4 - Moderately severe disability.  Unable to walk without assistance, and unable to attend to own bodily needs without assistance.  -HC    Functional Assessment    Outcome Measure Options AM-PAC 6 Clicks Daily Activity (OT)  -HC AM-PAC 6 Clicks Basic Mobility (PT)  -EE AM-PAC 6 Clicks Daily Activity (OT);Modified [place]  -HC      12/05/17 1100 12/04/17 1500       How much help from another person do you currently need...    Turning from your back to your side while in flat bed without using bedrails? 3  -EE 3  -EE     Moving from lying on back to sitting on the side of a flat bed without bedrails? 3  -EE 3  -EE     Moving to and from a bed to a chair (including a wheelchair)? 3  -EE 3  -EE     Standing up from a chair using your arms (e.g., wheelchair, bedside chair)? 3  -EE 3  -EE     Climbing 3-5 steps with a railing? 2  -EE 2  -EE     To walk in hospital room? 3  -EE 3  -EE     AM-PAC 6 Clicks Score 17  -EE 17  -EE     Functional Assessment    Outcome Measure Options AM-PAC 6 Clicks Basic Mobility (PT)  -EE AM-PAC 6 Clicks Basic Mobility (PT)  -EE       User Key  (r) = Recorded By, (t) = Taken By, (c) = Cosigned By    Initials Name Provider Type    MARCIAL Way PT Physical Therapist    TRINO Mcgarry OTR Occupational Therapist           Time Calculation:         Time Calculation- OT       12/06/17 1505          Time Calculation- OT    OT Start Time 1430  -      OT Stop Time 1441  -      OT Time Calculation (min) 11 min  -HC      OT Received  On 12/06/17  -      OT - Next Appointment 12/07/17  -        User Key  (r) = Recorded By, (t) = Taken By, (c) = Cosigned By    Initials Name Provider Type     BRANT Hicks Occupational Therapist           Therapy Charges for Today     Code Description Service Date Service Provider Modifiers Qty    [id number]  OT EVAL LOW COMPLEXITY 2 12/5/2017 BRANT Hicks GO 1    [id number]  OT SELF CARE/MGMT/TRAIN EA 15 MIN 12/5/2017 BRANT Hicks GO 1    [id number]  OT NEUROMUSC RE EDUCATION EA 15 MIN 12/6/2017 BRANT Hicks GO 1               BRANT Hicks  12/6/2017

## 2017-12-06 NOTE — PLAN OF CARE
Problem: Stroke (Ischemic) (Adult)  Goal: Signs and Symptoms of Listed Potential Problems Will be Absent or Manageable (Stroke)  Outcome: Ongoing (interventions implemented as appropriate)    Problem: Patient Care Overview (Adult)  Goal: Plan of Care Review  Outcome: Ongoing (interventions implemented as appropriate)    Problem: Pressure Ulcer Risk (Maury Scale) (Adult,Obstetrics,Pediatric)  Goal: Skin Integrity  Outcome: Ongoing (interventions implemented as appropriate)    12/06/17 1817   Pressure Ulcer Risk (Maury Scale) (Adult,Obstetrics,Pediatric)   Skin Integrity making progress toward outcome

## 2017-12-06 NOTE — PLAN OF CARE
Problem: Patient Care Overview (Adult)  Goal: Plan of Care Review    12/06/17 0946   Patient Care Overview   Progress unable to show any progress toward functional goals   Outcome Evaluation   Outcome Summary/Follow up Plan Pt more lethargic today 2' receiving ativan. Able to maintain activity level but requiring increased assistance with transfers and balance due to lethargy and weakness. Will continue to increase independence as able.

## 2017-12-06 NOTE — THERAPY TREATMENT NOTE
"Acute Care - Physical Therapy Treatment Note  [facility]     Patient Name: Angelita Brambila  : 1965  MRN: [id number]  Today's Date: 2017  Onset of Illness/Injury or Date of Surgery Date: 17  Date of Referral to PT: 17  Referring Physician: Rajendra    Admit Date: 2017    Visit Dx:    ICD-10-CM ICD-9-CM   1. Cerebrovascular accident (CVA), unspecified mechanism I63.9 434.91   2. Hypokalemia E87.6 276.8   3. Hemiparesis affecting left side as late effect of cerebrovascular accident I69.354 438.20     Patient Active Problem List   Diagnosis   • Complete tear of right rotator cuff   • Precordial pain   • Dyspnea on exertion   • Palpitations   • Essential hypertension   • Hyperlipidemia   • Tobacco abuse   • CVA (cerebral vascular accident)               Adult Rehabilitation Note       17 0900 17 1300 17 1104    Rehab Assessment/Intervention    Discipline physical therapist  -EE speech language pathologist  -AW physical therapist  -EE    Document Type therapy note (daily note)  -EE therapy note (daily note)  -AW therapy note (daily note)  -EE    Subjective Information agree to therapy  -EE agree to therapy  -AW agree to therapy;no complaints  -EE    Patient Effort, Rehab Treatment good  -EE adequate  -AW good  -EE    Symptoms Noted During/After Treatment none  -EE other (see comments)  -AW none  -EE    Symptoms Noted Comment  Nursing reported increased confusion. Numerous family members present reporting pt anxious and agitated. Pt stated, \"it's not a good time.\" Pt seen for VFSS f/u and diet tolerance. Will defer cognitive eval today.  -AW     Precautions/Limitations fall precautions  -EE fall precautions;swallowing precautions  -AW fall precautions;other (see comments)   possible R wrist fx  -EE    Specific Treatment Considerations Posey vest applied; ok for PT per So PABLO. Somewhat lethargic; RN reports pt given ativan overnight  -EE      Recorded by [EE] Saritha Way, " PT [AW] Diane Nelson MS CCC-SLP [EE] Saritha Way PT    Pain Assessment    Pain Assessment 0-10  -EE  0-10  -EE    Pain Score 4  -EE  3  -EE    Pain Type Acute pain  -EE  Chronic pain  -EE    Pain Location Wrist  -EE  Back  -EE    Pain Orientation Right  -EE  Lower  -EE    Pain Intervention(s) Repositioned;Rest  -EE  Repositioned;Ambulation/increased activity  -EE    Response to Interventions tolerated  -EE  tolerated  -EE    Recorded by [EE] Saritha Way PT  [EE] Saritha Way PT    Vision Assessment/Intervention    Visual Impairment   inattention to the left;left neglect;needs cues to attend visually  -EE    Recorded by   [EE] Saritha Way PT    Cognitive Assessment/Intervention    Current Cognitive/Communication Assessment impaired  -EE impaired   pt appeared agitated, decreased insight, confused  -AW impaired  -EE    Orientation Status oriented to;person  -EE oriented to;person  -AW oriented to;person;place  -EE    Follows Commands/Answers Questions 75% of the time;able to follow single-step instructions;needs cueing;needs repetition  -% of the time;able to follow single-step instructions  -% of the time;able to follow single-step instructions;needs cueing;needs increased time;needs repetition  -EE    Personal Safety moderate impairment;at risk behaviors demonstrated;decreased awareness, need for assist;decreased awareness, need for safety;decreased insight to deficits  -EE decreased awareness, need for assist;decreased awareness, need for safety  -AW mild impairment;at risk behaviors demonstrated;decreased awareness, need for safety;decreased insight to deficits  -EE    Personal Safety Interventions fall prevention program maintained;gait belt;muscle strengthening facilitated;nonskid shoes/slippers when out of bed;supervised activity  -EE fall prevention program maintained  -AW fall prevention program maintained;gait belt;nonskid shoes/slippers when out of bed;supervised activity  -EE    Recorded by  [EE] Saritha Way PT [AW] Diane Nelson MS CCC-SLP [EE] Saritha Way PT    Dysphagia/Swallow Intervention    Dysphagia/Swallow Intervention  Reviewed results of VFSS with pt and family. Pt and family verbalized understanding of diet and strategies. Pt is tolerating [facility] soft diet, lungs clear.   -AW     Recorded by  [AW] Diane Nelson MS CCC-SLP     Bed Mobility, Assessment/Treatment    Bed Mobility, Assistive Device head of bed elevated;bed rails  -EE      Bed Mob, Supine to Sit, [place] contact guard assist;minimum assist (75% patient effort);verbal cues required  -EE  not tested  -EE    Bed Mob, Sit to Supine, [place] minimum assist (75% patient effort);verbal cues required  -EE  not tested  -EE    Bed Mobility, Impairments strength decreased;coordination impaired  -EE      Bed Mobility, Comment   up in room with nsg staff, then up in chair  -EE    Recorded by [EE] Saritha Way PT  [EE] Saritha Way PT    Transfer Assessment/Treatment    Transfers, Sit-Stand [place] contact guard assist;minimum assist (75% patient effort);verbal cues required;nonverbal cues required (demo/gesture);hand held assist;1 person + 1 person to manage equipment  -EE  contact guard assist;verbal cues required  -EE    Transfers, Stand-Sit [place] contact guard assist;verbal cues required  -EE  contact guard assist;verbal cues required  -EE    Transfer, Safety Issues balance decreased during turns;weight-shifting ability decreased  -EE  balance decreased during turns  -EE    Transfer, Impairments strength decreased;impaired balance;coordination impaired;motor control impaired  -EE  strength decreased;impaired balance;coordination impaired  -EE    Transfer, Comment   cues to scan to L during turns to avoid obstacles  -EE    Recorded by [EE] Saritha Way PT  [EE] Saritha Way PT    Gait Assessment/Treatment    Gait, [place] Level contact guard assist;minimum assist (75% patient effort);verbal cues required;nonverbal cues  required (demo/gesture);hand held assist;1 person + 1 person to manage equipment  -EE  contact guard assist;minimum assist (75% patient effort);verbal cues required  -EE    Gait, Distance (Feet) 400  -EE  400  -EE    Gait, Gait Deviations ronnell decreased;step length decreased;stride length decreased;weight-shifting ability decreased;forward flexed posture  -EE  ronnell decreased;narrow base;step length decreased;stride length decreased  -EE    Gait, Safety Issues step length decreased;balance decreased during turns  -EE  step length decreased;balance decreased during turns  -EE    Gait, Impairments strength decreased;impaired balance;coordination impaired;impaired vision;motor control impaired  -EE  strength decreased;impaired balance;impaired vision;coordination impaired  -EE    Gait, Comment Verbal cues to scan to L and avoid obstacles on L. Increased forward flexed posture today; cues for upright posture.  -EE  Cueing required to scan to L and avoid obstacles on L side. Able to ambulate and maintain balance w/o assist at R UE today.   -EE    Recorded by [EE] Saritha Way PT  [EE] Saritha Way PT    Positioning and Restraints    Pre-Treatment Position in bed  -EE  in bed  -EE    Post Treatment Position bed  -EE  chair  -EE    In Bed fowlers;call light within reach;encouraged to call for assist;exit alarm on;with nsg  -EE      In Chair   sitting;call light within reach;encouraged to call for assist;exit alarm on;notified nsg  -EE    Restraints reapplied:;vest  -EE      Recorded by [EE] Saritha Way PT  [EE] Saritha Way PT      12/04/17 0791          Rehab Assessment/Intervention    Discipline physical therapist  -EE      Document Type therapy note (daily note)  -EE      Subjective Information agree to therapy;complains of;pain  -EE      Patient Effort, Rehab Treatment good  -EE      Symptoms Noted During/After Treatment increased pain;other (see comments)   headache  -EE      Symptoms Noted Comment Pt reported  "\"I think my headache is coming back\" following activity. RNJyothi, at bedside and aware.  -EE      Precautions/Limitations fall precautions;other (see comments)   possible R wrist fx; has not been splinted yet  -EE      Specific Treatment Considerations Potential R wrist fx that has not been splinted; assisted pt by holding R elbow/forearm and maintained NWB on R hand/wrist as a precaution  -EE      Recorded by [EE] Saritha Way, PT      Pain Assessment    Pain Assessment 0-10  -EE      Pain Score 5  -EE      Pain Type Acute pain  -EE      Pain Location Head   also R wrist soreness from suspected fx  -EE      Pain Descriptors Aching;Sore  -EE      Pain Intervention(s) Repositioned;Rest  -EE      Response to Interventions tolerated  -EE      Recorded by [EE] Saritha Way, PT      Vision Assessment/Intervention    Visual Impairment inattention to the left;left neglect;needs cues to attend visually  -EE      Visual Impairment Comment Demonstrates preference for R gaze, able to turn head to midline and to L with cueing  -EE      Recorded by [EE] Saritha Way, PT      Cognitive Assessment/Intervention    Current Cognitive/Communication Assessment functional  -EE      Orientation Status oriented x 4  -EE      Follows Commands/Answers Questions 100% of the time  -EE      Personal Safety mild impairment;decreased insight to deficits  -EE      Personal Safety Interventions fall prevention program maintained;gait belt;muscle strengthening facilitated;nonskid shoes/slippers when out of bed;supervised activity  -EE      Recorded by [EE] Saritha Way, PT      Bed Mobility, Assessment/Treatment    Bed Mobility, Assistive Device bed rails;head of bed elevated  -EE      Bed Mob, Supine to Sit, [place] contact guard assist;verbal cues required  -EE      Bed Mob, Sit to Supine, [place] supervision required  -EE      Bed Mobility, Impairments strength decreased;pain;motor control impaired;coordination impaired  -EE      " Recorded by [EE] Saritha Way PT      Transfer Assessment/Treatment    Transfers, Sit-Stand [place] contact guard assist;verbal cues required;nonverbal cues required (demo/gesture);hand held assist  -EE      Transfers, Stand-Sit [place] contact guard assist;verbal cues required;hand held assist  -EE      Transfer, Safety Issues step length decreased;balance decreased during turns  -EE      Transfer, Impairments strength decreased;impaired balance;coordination impaired;motor control impaired;pain  -EE      Recorded by [EE] Saritha Way PT      Gait Assessment/Treatment    Gait, [place] Level contact guard assist;minimum assist (75% patient effort);verbal cues required;nonverbal cues required (demo/gesture);hand held assist  -EE      Gait, Distance (Feet) 400  -EE      Gait, Gait Deviations ronnell decreased;leans to the left;ataxia;narrow base;weight-shifting ability decreased;step length decreased  -EE      Gait, Safety Issues step length decreased;weight-shifting ability decreased;balance decreased during turns  -EE      Gait, Impairments strength decreased;impaired balance;coordination impaired;impaired vision;motor control impaired  -EE      Gait, Comment Pt assisted by holding R elbow; avoided weightbearing on R hand/wrist. Verbal cues to scan to L and avoid obstacles on L sided during ambulation.  -EE      Recorded by [EE] Saritha Way PT      Positioning and Restraints    Pre-Treatment Position in bed  -EE      Post Treatment Position bed  -EE      In Bed fowlers;call light within reach;encouraged to call for assist;exit alarm on;with family/caregiver;notified nsg;SCD pump applied;LUE elevated  -EE      Recorded by [EE] Saritha Way PT        User Key  (r) = Recorded By, (t) = Taken By, (c) = Cosigned By    Initials Name Effective Dates    AW Diane Nelson MS CCC-SLP 04/13/15 -     EE Saritha Way PT 12/01/15 -                 IP PT Goals       12/03/17 1128          Bed Mobility PT LTG    Bed  Mobility PT LTG, Date Established 12/03/17  -PC      Bed Mobility PT LTG, Time to Achieve 1 wk  -PC      Bed Mobility PT LTG, Activity Type supine to sit/sit to supine  -PC      Bed Mobility PT LTG, [place] Level supervision required  -PC      Transfer Training PT LTG    Transfer Training PT LTG, Date Established 12/03/17  -PC      Transfer Training PT LTG, Time to Achieve 1 wk  -PC      Transfer Training PT LTG, Activity Type sit to stand/stand to sit  -PC      Transfer Training PT LTG, [place] Level contact guard assist  -PC      Gait Training PT LTG    Gait Training Goal PT LTG, Date Established 12/03/17  -PC      Gait Training Goal PT LTG, Time to Achieve 1 wk  -PC      Gait Training Goal PT LTG, [place] Level contact guard assist  -PC      Gait Training Goal PT LTG, Distance to Achieve 150 ft with appropriate assistive device  -PC        User Key  (r) = Recorded By, (t) = Taken By, (c) = Cosigned By    Initials Name Provider Type    SHEELA Champion, PT Physical Therapist          Physical Therapy Education     Title: PT OT SLP Therapies (Active)     Topic: Physical Therapy (Active)     Point: Mobility training (Active)    Learning Progress Summary    Learner Readiness Method Response Comment Documented by Status   Patient Acceptance E,TB NR  EE 12/06/17 0945 Active    Acceptance E,TB NR  EE 12/05/17 1116 Active    Acceptance E,TB NR  EE 12/04/17 1500 Active    Acceptance E,D NR  PC 12/03/17 1127 Active               Point: Home exercise program (Active)    Learning Progress Summary    Learner Readiness Method Response Comment Documented by Status   Patient Acceptance E,D NR  PC 12/03/17 1127 Active               Point: Body mechanics (Active)    Learning Progress Summary    Learner Readiness Method Response Comment Documented by Status   Patient Acceptance E,TB NR  EE 12/06/17 0945 Active    Acceptance E,TB NR  EE 12/05/17 1116 Active    Acceptance E,TB NR  EE 12/04/17 1500 Active    Acceptance  E,D NR  PC 12/03/17 1127 Active               Point: Precautions (Active)    Learning Progress Summary    Learner Readiness Method Response Comment Documented by Status   Patient Acceptance E,TB NR  EE 12/06/17 0945 Active    Acceptance E,TB NR  EE 12/05/17 1116 Active    Acceptance E,TB NR  EE 12/04/17 1500 Active    Acceptance E,D NR  PC 12/03/17 1127 Active                      User Key     Initials Effective Dates Name Provider Type Discipline    PC 12/01/15 -  Haily Champion, PT Physical Therapist PT    EE 12/01/15 -  Saritha Way, PT Physical Therapist PT                    PT Recommendation and Plan  Anticipated Discharge Disposition: inpatient rehabilitation facility  Planned Therapy Interventions: bed mobility training, balance training, gait training, home exercise program, strengthening, transfer training  PT Frequency: daily  Plan of Care Review  Plan Of Care Reviewed With: patient  Progress: unable to show any progress toward functional goals  Outcome Summary/Follow up Plan: Pt more lethargic today 2' receiving ativan. Able to maintain activity level but requiring increased assistance with transfers and balance due to lethargy and weakness. Will continue to increase independence as able.           Outcome Measures       12/06/17 0900 12/05/17 1200 12/05/17 1100    How much help from another person do you currently need...    Turning from your back to your side while in flat bed without using bedrails? 3  -EE  3  -EE    Moving from lying on back to sitting on the side of a flat bed without bedrails? 3  -EE  3  -EE    Moving to and from a bed to a chair (including a wheelchair)? 3  -EE  3  -EE    Standing up from a chair using your arms (e.g., wheelchair, bedside chair)? 3  -EE  3  -EE    Climbing 3-5 steps with a railing? 2  -EE  2  -EE    To walk in hospital room? 3  -EE  3  -EE    AM-PAC 6 Clicks Score 17  -EE  17  -EE    How much help from another is currently needed...    Putting on and taking off  regular lower body clothing?  3  -HC     Bathing (including washing, rinsing, and drying)  3  -HC     Toileting (which includes using toilet bed pan or urinal)  3  -HC     Putting on and taking off regular upper body clothing  3  -HC     Taking care of personal grooming (such as brushing teeth)  3  -HC     Eating meals  3  -HC     Score  18  -HC     Modified [place] Scale    Modified [place] Scale  4 - Moderately severe disability.  Unable to walk without assistance, and unable to attend to own bodily needs without assistance.  -HC     Functional Assessment    Outcome Measure Options AM-PAC 6 Clicks Basic Mobility (PT)  -EE AM-PAC 6 Clicks Daily Activity (OT);Modified [place]  -HC AM-PAC 6 Clicks Basic Mobility (PT)  -EE      12/04/17 1500 12/03/17 1100       How much help from another person do you currently need...    Turning from your back to your side while in flat bed without using bedrails? 3  -EE 3  -PC     Moving from lying on back to sitting on the side of a flat bed without bedrails? 3  -EE 3  -PC     Moving to and from a bed to a chair (including a wheelchair)? 3  -EE 3  -PC     Standing up from a chair using your arms (e.g., wheelchair, bedside chair)? 3  -EE 3  -PC     Climbing 3-5 steps with a railing? 2  -EE 2  -PC     To walk in hospital room? 3  -EE 2  -PC     AM-PAC 6 Clicks Score 17  -EE 16  -PC     Functional Assessment    Outcome Measure Options AM-PAC 6 Clicks Basic Mobility (PT)  -EE AM-PAC 6 Clicks Basic Mobility (PT)  -PC       User Key  (r) = Recorded By, (t) = Taken By, (c) = Cosigned By    Initials Name Provider Type    PC Haily Champion, PT Physical Therapist    EE Saritha Way, PT Physical Therapist    HC Vida Mcgarry, OTR Occupational Therapist           Time Calculation:         PT Charges       12/06/17 0947          Time Calculation    Start Time 0900  -EE      Stop Time 0914  -EE      Time Calculation (min) 14 min  -EE      PT Received On 12/06/17  -EE      PT - Next Appointment  12/07/17  -        User Key  (r) = Recorded By, (t) = Taken By, (c) = Cosigned By    Initials Name Provider Type    EE Saritha Way PT Physical Therapist          Therapy Charges for Today     Code Description Service Date Service Provider Modifiers Qty    42480784923 HC PT THER PROC EA 15 MIN 12/5/2017 Saritha Way, PT GP 1    35643183551 HC PT THER SUPP EA 15 MIN 12/5/2017 Saritha Way PT GP 1    42873691026 HC PT THER PROC EA 15 MIN 12/6/2017 Saritha Way PT GP 1    00415522311 HC PT THER SUPP EA 15 MIN 12/6/2017 Saritha Way, PT GP 1          PT G-Codes  Outcome Measure Options: AM-PAC 6 Clicks Basic Mobility (PT)    Saritha Way PT  12/6/2017

## 2017-12-06 NOTE — DISCHARGE PLACEMENT REQUEST
"Angelita Carter (52 y.o. Female)     Date of Birth Social Security Number Address Home Phone MRN    1965  [address] [facility] [address] [phone] [id number]    Druze Marital Status          Hindu        Admission Date Admission Type Admitting Provider Attending Provider Department, Room/Bed    12/1/17 Emergency Aly Drew MD Anaya, Fadi ELDRIDGE MD [address], P578/1    Discharge Date Discharge Disposition Discharge Destination                      Attending Provider: Fadi Mathews MD     Allergies:  Sulfa Antibiotics, Beta Adrenergic Blockers, Codeine, Erythromycin, Penicillins, Tetracyclines & Related, Varenicline    Isolation:  None   Infection:  None   Code Status:  FULL    Ht:  165.1 cm (65\")   Wt:  66.2 kg (146 lb)    Admission Cmt:  None   Principal Problem:  None                Active Insurance as of 12/1/2017     Primary Coverage     Payor Plan Insurance Group Employer/Plan Group    [employer] EMPLOYEE [id number]     Payor Plan Address Payor Plan Phone Number Effective From Effective To    [address] [phone] 1/1/2015     [place] [address]       Subscriber Name Subscriber Birth Date Member ID       ANGELITA CARTER 1965 [id number]                 Emergency Contacts      (Rel.) Home Phone Work Phone Mobile Phone    Letty Renner (Daughter) -- [phone]    Bekah Rosales (Relative) -- -- [phone]              "

## 2017-12-06 NOTE — PLAN OF CARE
Problem: Stroke (Ischemic) (Adult)  Goal: Signs and Symptoms of Listed Potential Problems Will be Absent or Manageable (Stroke)  Outcome: Ongoing (interventions implemented as appropriate)    Problem: Patient Care Overview (Adult)  Goal: Plan of Care Review  Outcome: Ongoing (interventions implemented as appropriate)    12/06/17 0510   Coping/Psychosocial Response Interventions   Plan Of Care Reviewed With patient;daughter;family   Patient Care Overview   Progress progress towards functional goals is fair   Outcome Evaluation   Outcome Summary/Follow up Plan Still confused, belligerent, noncompliant.          Problem: SAFETY - NON-VIOLENT RESTRAINT  Goal: Remains free of injury from restraints (Non-Violent Restraint)  Outcome: Ongoing (interventions implemented as appropriate)  Goal: Free from restraint(s) (Non-Violent Restraint)  Outcome: Ongoing (interventions implemented as appropriate)

## 2017-12-07 ENCOUNTER — APPOINTMENT (OUTPATIENT)
Dept: CARDIOLOGY | Facility: HOSPITAL | Age: 52
End: 2017-12-07

## 2017-12-07 PROCEDURE — 93880 EXTRACRANIAL BILAT STUDY: CPT

## 2017-12-07 PROCEDURE — 97110 THERAPEUTIC EXERCISES: CPT

## 2017-12-07 PROCEDURE — 92523 SPEECH SOUND LANG COMPREHEN: CPT

## 2017-12-07 PROCEDURE — 99233 SBSQ HOSP IP/OBS HIGH 50: CPT | Performed by: NURSE PRACTITIONER

## 2017-12-07 PROCEDURE — 97112 NEUROMUSCULAR REEDUCATION: CPT

## 2017-12-07 RX ORDER — QUETIAPINE FUMARATE 25 MG/1
25 TABLET, FILM COATED ORAL ONCE
Status: DISCONTINUED | OUTPATIENT
Start: 2017-12-07 | End: 2017-12-13 | Stop reason: HOSPADM

## 2017-12-07 RX ORDER — POLYETHYLENE GLYCOL 3350 17 G/17G
17 POWDER, FOR SOLUTION ORAL DAILY
Status: DISCONTINUED | OUTPATIENT
Start: 2017-12-07 | End: 2017-12-13 | Stop reason: HOSPADM

## 2017-12-07 RX ADMIN — FAMOTIDINE 20 MG: 20 TABLET, FILM COATED ORAL at 08:34

## 2017-12-07 RX ADMIN — FAMOTIDINE 20 MG: 20 TABLET, FILM COATED ORAL at 17:53

## 2017-12-07 RX ADMIN — NICOTINE 1 PATCH: 21 PATCH, EXTENDED RELEASE TRANSDERMAL at 12:24

## 2017-12-07 RX ADMIN — FOLIC ACID 1 MG: 1 TABLET ORAL at 08:34

## 2017-12-07 RX ADMIN — AMLODIPINE BESYLATE 10 MG: 10 TABLET ORAL at 08:34

## 2017-12-07 RX ADMIN — HYDROCODONE BITARTRATE AND ACETAMINOPHEN 1 TABLET: 5; 325 TABLET ORAL at 20:00

## 2017-12-07 RX ADMIN — Medication 200 MG: at 08:34

## 2017-12-07 RX ADMIN — ROPINIROLE 1 MG: 1 TABLET, FILM COATED ORAL at 08:34

## 2017-12-07 RX ADMIN — POLYETHYLENE GLYCOL 3350 17 G: 17 POWDER, FOR SOLUTION ORAL at 17:53

## 2017-12-07 RX ADMIN — ROPINIROLE 1 MG: 1 TABLET, FILM COATED ORAL at 17:53

## 2017-12-07 RX ADMIN — ASPIRIN 81 MG: 81 TABLET, CHEWABLE ORAL at 08:34

## 2017-12-07 NOTE — PLAN OF CARE
Problem: Patient Care Overview (Adult)  Goal: Plan of Care Review  Outcome: Ongoing (interventions implemented as appropriate)    12/07/17 1030   Coping/Psychosocial Response Interventions   Plan Of Care Reviewed With patient   Outcome Evaluation   Outcome Summary/Follow up Plan Cognitive eval completed, recommend motor speech eval.         Problem: Inpatient SLP  Goal: Cognitive-linguistic-Patient will improve Cognitive-linguistic skills to improve safety and safety awareness in environment  Outcome: Ongoing (interventions implemented as appropriate)    12/07/17 1030   Cognitive Linguistic- Improve Safety and Awareness   Cognitive Linguistic Improve Safety and Awareness- SLP, Date Established 12/07/17   Cognitive Linguistic Improve Safety and Awareness- SLP, Time to Achieve by discharge   Cognitive Linguistic Improve Safety and Awareness- SLP, Outcome goal ongoing

## 2017-12-07 NOTE — SIGNIFICANT NOTE
12/07/17 0936   Rehab Treatment   Discipline physical therapist   Treatment Not Performed other (see comments)  (Pt getting bath with [facility] staff; will follow up later today. )   Recommendation   PT - Next Appointment 12/07/17

## 2017-12-07 NOTE — THERAPY TREATMENT NOTE
"Acute Care - Occupational Therapy Treatment Note  [facility]     Patient Name: Angelita Brambila  : 1965  MRN: [id number]  Today's Date: 2017  Onset of Illness/Injury or Date of Surgery Date: 17     Referring Physician: Rajendra      Admit Date: 2017    Visit Dx:     ICD-10-CM ICD-9-CM   1. Cerebrovascular accident (CVA), unspecified mechanism I63.9 434.91   2. Hypokalemia E87.6 276.8   3. Hemiparesis affecting left side as late effect of cerebrovascular accident I69.354 438.20     Patient Active Problem List   Diagnosis   • Complete tear of right rotator cuff   • Precordial pain   • Dyspnea on exertion   • Palpitations   • Essential hypertension   • Hyperlipidemia   • Tobacco abuse   • CVA (cerebral vascular accident)             Adult Rehabilitation Note       17 1100 17 1400 17 1100    Rehab Assessment/Intervention    Discipline occupational therapist  -HC occupational therapist  -HC speech language pathologist  -SA    Document Type therapy note (daily note)  -HC therapy note (daily note)  -HC therapy note (daily note)  -SA    Subjective Information agree to therapy  -HC agree to therapy  -HC agree to therapy  -SA    Patient Effort, Rehab Treatment good  -HC good  -HC adequate  -SA    Symptoms Noted During/After Treatment none  -HC none  -HC fatigue  -SA    Symptoms Noted Comment \"I'm trying to use my left hand with everything.\" \"I want to go home.\"  -HC Pt still confused using remote as cell phone and searching for cigarrettes.  -HC Nsg stated pt more confused d/t \"detoxing\". Nsg states not a good day for sp/lang/cog eval. Will defer unitl .   -SA    Precautions/Limitations fall precautions  -HC fall precautions  -HC swallowing precautions;fall precautions  -SA    Specific Treatment Considerations  Posey vest on upon arrival, RN confirmed okay to take off during tx and to reapply either in bed or chair.  -HC Posey vest on pt during tx  -SA    Patient Response to " Treatment   tearful  -SA    Recorded by [HC] Vida Mcgarry, OTR [HC] Vida Mcgarry OTR [SA] Heather Amin MS CCC-SLP    Pain Assessment    Pain Assessment No/denies pain  -HC No/denies pain  -HC     Recorded by [HC] Vida Mcgarry, OTR [HC] Vida Mcgarry, OTR     Vision Assessment/Intervention    Visual Impairment inattention to the left;left neglect;needs cues to attend visually  -HC inattention to the left;left neglect;needs cues to attend visually  -HC     Recorded by [HC] Vida Mcgarry, OTR [HC] Vida Mcgarry, OTR     Cognitive Assessment/Intervention    Current Cognitive/Communication Assessment impaired  -HC impaired  -HC impaired  -SA    Orientation Status oriented to;person;place  -HC oriented to;person  -HC oriented to;person;place  -SA    Follows Commands/Answers Questions 75% of the time;able to follow single-step instructions;needs cueing;needs increased time;needs repetition  -HC 50% of the time;able to follow single-step instructions;needs cueing;needs increased time;needs repetition  -HC 50% of the time;able to follow single-step instructions;needs cueing;needs increased time;needs repetition  -SA    Personal Safety moderate impairment  -HC moderate impairment;at risk behaviors demonstrated;decreased awareness, need for assist;decreased awareness, need for safety;decreased insight to deficits;impulsive  -HC decreased awareness, need for safety;decreased awareness, need for assist;decreased insight to deficits  -SA    Personal Safety Interventions fall prevention program maintained;gait belt;nonskid shoes/slippers when out of bed  -HC fall prevention program maintained;gait belt;nonskid shoes/slippers when out of bed  -HC fall prevention program maintained  -SA    Recorded by [HC] Vida Mcgarry, OTR [HC] Vida Mcgarry OTR [SA] Heather Amin MS CCC-SLP    Dysphagia/Swallow Intervention    Dysphagia/Swallow Intervention   Pt appears tolerating diet; mech soft/thin; pt w/ moderate dysarthria; confused.  breath sounds clear; po intake fair d/t detoxing per nsg.   -SA    Recorded by   [SA] Heather Amin MS CCC-SLP    Improve oral skills    To Improve Oral Skills, patient will:   Increase lip closure;Increase tongue tip elevation;Increase tongue lateralization;Increase tongue A-P movement;Increase back of tongue control  -SA    Oral Skills Progress   following model;with consistent cues  -SA    Comments: Improve Oral Skills   Attempted active lingual ex's. Pt not able to complete d/t confusion; however, she was able to tolerate passive oral stretches. Completed buccal and labial stretches w/ good tolerance  -SA    Recorded by   [SA] Heather Amin MS CCC-SLP    Improve tongue base & pharyngeal wall squeeze    To improve tongue base & pharyngeal wall squeeze, patient will:   Complete tongue hold swallow  -SA    Tongue Base/Pharyngeal Wall Squeeze Progress   with consistent cues;0%;following model  -SA    Comments: Improve tongue base & pharyngeal wall squeeze   Unable to complete d/t confusion.  -SA    Recorded by   [SA] Heather Amin MS CCC-SLP    Bed Mobility, Assessment/Treatment    Bed Mobility, Assistive Device head of bed elevated;bed rails  -HC head of bed elevated;bed rails  -HC     Bed Mob, Supine to Sit, [place] contact guard assist  -HC contact guard assist;minimum assist (75% patient effort);verbal cues required;set up required  -HC     Bed Mob, Sit to Supine, [place] not tested  -HC minimum assist (75% patient effort);verbal cues required  -HC     Recorded by [HC] BRANT Hicks [HC] BRANT Hicks     Transfer Assessment/Treatment    Transfers, Bed-Chair [place] contact guard assist;verbal cues required  -HC      Transfers, Chair-Bed [place] not tested  -HC      Transfers, Sit-Stand [place] contact guard assist;verbal cues required  -HC contact guard assist;verbal cues required  -HC     Transfers, Stand-Sit [place] contact guard assist;verbal cues required  -HC contact  guard assist;verbal cues required  -HC     Transfer, Comment VCs to open eyes  -HC VCs to open eyes, very lethargic  -HC     Recorded by [HC] BRANT Hicks [HC] BRANT Hicks     Functional Mobility    Functional Mobility- Ind. Level  contact guard assist;minimum assist (75% patient effort)  -HC     Functional Mobility- Device  other (see comments)   HHA  -HC     Functional Mobility-Distance (Feet)  --   around room from bed and back  -HC     Recorded by  [HC] BRANT Hicks     Motor Skills/Interventions    Additional Documentation Fine Motor Coordination Training (Group)  -HC      Recorded by [HC] BRANT Hicks      Therapy Exercises    Left Upper Extremity  AROM:;10 reps;supine;elbow flexion/extension;hand pumps;pronation/supination;shoulder extension/flexion   shoulder 3/4 range with scaption; slow motor control  -HC     Recorded by  [HC] BRANT Hicks     Fine Motor Coordination Training    Detail (Fine Motor Coordination Training) Pt incoporating L hand during functional tasks while opening crayon box and stabilizing coloring book with L hand. VCs to attend to L during tasks.  -HC      Recorded by [HC] BRANT Hicks      Positioning and Restraints    Pre-Treatment Position in bed  -HC in bed  -HC     Post Treatment Position chair  -HC bed  -HC     In Bed  supine;call light within reach;encouraged to call for assist;exit alarm on  -HC     In Chair sitting;call light within reach;encouraged to call for assist;exit alarm on  -HC      Restraints  reapplied:;vest  -HC     Recorded by [HC] BRANT Hicks [HC] BRANT Hicks       12/06/17 0900 12/05/17 1300 12/05/17 1104    Rehab Assessment/Intervention    Discipline physical therapist  -EE speech language pathologist  -AW physical therapist  -EE    Document Type therapy note (daily note)  -EE therapy note (daily note)  -AW therapy note (daily note)  -EE    Subjective Information agree to therapy  -EE agree to therapy  -AW agree  "to therapy;no complaints  -EE    Patient Effort, Rehab Treatment good  -EE adequate  -AW good  -EE    Symptoms Noted During/After Treatment none  -EE other (see comments)  -AW none  -EE    Symptoms Noted Comment  Nursing reported increased confusion. Numerous family members present reporting pt anxious and agitated. Pt stated, \"it's not a good time.\" Pt seen for VFSS f/u and diet tolerance. Will defer cognitive eval today.  -AW     Precautions/Limitations fall precautions  -EE fall precautions;swallowing precautions  -AW fall precautions;other (see comments)   possible R wrist fx  -EE    Specific Treatment Considerations Posey vest applied; ok for PT per So PABLO. Somewhat lethargic; RN reports pt given ativan overnight  -EE      Recorded by [EE] Saritha Way PT [AW] Diane Nelson MS CCC-SLP [EE] Saritha Way PT    Pain Assessment    Pain Assessment 0-10  -EE  0-10  -EE    Pain Score 4  -EE  3  -EE    Pain Type Acute pain  -EE  Chronic pain  -EE    Pain Location Wrist  -EE  Back  -EE    Pain Orientation Right  -EE  Lower  -EE    Pain Intervention(s) Repositioned;Rest  -EE  Repositioned;Ambulation/increased activity  -EE    Response to Interventions tolerated  -EE  tolerated  -EE    Recorded by [EE] Saritha Way, PT  [EE] Saritha Way, PT    Vision Assessment/Intervention    Visual Impairment   inattention to the left;left neglect;needs cues to attend visually  -EE    Recorded by   [EE] Saritha Way PT    Cognitive Assessment/Intervention    Current Cognitive/Communication Assessment impaired  -EE impaired   pt appeared agitated, decreased insight, confused  -AW impaired  -EE    Orientation Status oriented to;person  -EE oriented to;person  -AW oriented to;person;place  -EE    Follows Commands/Answers Questions 75% of the time;able to follow single-step instructions;needs cueing;needs repetition  -% of the time;able to follow single-step instructions  -% of the time;able to follow single-step " instructions;needs cueing;needs increased time;needs repetition  -EE    Personal Safety moderate impairment;at risk behaviors demonstrated;decreased awareness, need for assist;decreased awareness, need for safety;decreased insight to deficits  -EE decreased awareness, need for assist;decreased awareness, need for safety  -AW mild impairment;at risk behaviors demonstrated;decreased awareness, need for safety;decreased insight to deficits  -EE    Personal Safety Interventions fall prevention program maintained;gait belt;muscle strengthening facilitated;nonskid shoes/slippers when out of bed;supervised activity  -EE fall prevention program maintained  -AW fall prevention program maintained;gait belt;nonskid shoes/slippers when out of bed;supervised activity  -EE    Recorded by [EE] Saritha Way, PT [AW] Diane Nelson MS CCC-SLP [EE] Saritha Way PT    Dysphagia/Swallow Intervention    Dysphagia/Swallow Intervention  Reviewed results of VFSS with pt and family. Pt and family verbalized understanding of diet and strategies. Pt is tolerating [facility] soft diet, lungs clear.   -AW     Recorded by  [AW] Diane Nelson MS CCC-SLP     Bed Mobility, Assessment/Treatment    Bed Mobility, Assistive Device head of bed elevated;bed rails  -EE      Bed Mob, Supine to Sit, [place] contact guard assist;minimum assist (75% patient effort);verbal cues required  -EE  not tested  -EE    Bed Mob, Sit to Supine, [place] minimum assist (75% patient effort);verbal cues required  -EE  not tested  -EE    Bed Mobility, Impairments strength decreased;coordination impaired  -EE      Bed Mobility, Comment   up in room with nsg staff, then up in chair  -EE    Recorded by [EE] Saritha Way PT  [EE] Saritha Way PT    Transfer Assessment/Treatment    Transfers, Sit-Stand [place] contact guard assist;minimum assist (75% patient effort);verbal cues required;nonverbal cues required (demo/gesture);hand held assist;1 person + 1 person to manage  equipment  -EE  contact guard assist;verbal cues required  -EE    Transfers, Stand-Sit [place] contact guard assist;verbal cues required  -EE  contact guard assist;verbal cues required  -EE    Transfer, Safety Issues balance decreased during turns;weight-shifting ability decreased  -EE  balance decreased during turns  -EE    Transfer, Impairments strength decreased;impaired balance;coordination impaired;motor control impaired  -EE  strength decreased;impaired balance;coordination impaired  -EE    Transfer, Comment   cues to scan to L during turns to avoid obstacles  -EE    Recorded by [EE] Saritha Way PT  [EE] Saritha Way PT    Gait Assessment/Treatment    Gait, [place] Level contact guard assist;minimum assist (75% patient effort);verbal cues required;nonverbal cues required (demo/gesture);hand held assist;1 person + 1 person to manage equipment  -EE  contact guard assist;minimum assist (75% patient effort);verbal cues required  -EE    Gait, Distance (Feet) 400  -EE  400  -EE    Gait, Gait Deviations ronnell decreased;step length decreased;stride length decreased;weight-shifting ability decreased;forward flexed posture  -EE  ronnell decreased;narrow base;step length decreased;stride length decreased  -EE    Gait, Safety Issues step length decreased;balance decreased during turns  -EE  step length decreased;balance decreased during turns  -EE    Gait, Impairments strength decreased;impaired balance;coordination impaired;impaired vision;motor control impaired  -EE  strength decreased;impaired balance;impaired vision;coordination impaired  -EE    Gait, Comment Verbal cues to scan to L and avoid obstacles on L. Increased forward flexed posture today; cues for upright posture.  -EE  Cueing required to scan to L and avoid obstacles on L side. Able to ambulate and maintain balance w/o assist at R UE today.   -EE    Recorded by [EE] Saritha Way PT  [EE] Saritha Way PT    Positioning and Restraints     "Pre-Treatment Position in bed  -EE  in bed  -EE    Post Treatment Position bed  -EE  chair  -EE    In Bed fowlers;call light within reach;encouraged to call for assist;exit alarm on;with nsg  -EE      In Chair   sitting;call light within reach;encouraged to call for assist;exit alarm on;notified nsg  -EE    Restraints reapplied:;vest  -EE      Recorded by [EE] Saritha Way, PT  [EE] Saritha Way PT      12/04/17 1437          Rehab Assessment/Intervention    Discipline physical therapist  -EE      Document Type therapy note (daily note)  -EE      Subjective Information agree to therapy;complains of;pain  -EE      Patient Effort, Rehab Treatment good  -EE      Symptoms Noted During/After Treatment increased pain;other (see comments)   headache  -EE      Symptoms Noted Comment Pt reported \"I think my headache is coming back\" following activity. Jyothi PABLO, at bedside and aware.  -EE      Precautions/Limitations fall precautions;other (see comments)   possible R wrist fx; has not been splinted yet  -EE      Specific Treatment Considerations Potential R wrist fx that has not been splinted; assisted pt by holding R elbow/forearm and maintained NWB on R hand/wrist as a precaution  -EE      Recorded by [EE] Saritha Way, PT      Pain Assessment    Pain Assessment 0-10  -EE      Pain Score 5  -EE      Pain Type Acute pain  -EE      Pain Location Head   also R wrist soreness from suspected fx  -EE      Pain Descriptors Aching;Sore  -EE      Pain Intervention(s) Repositioned;Rest  -EE      Response to Interventions tolerated  -EE      Recorded by [EE] Saritha Way, PT      Vision Assessment/Intervention    Visual Impairment inattention to the left;left neglect;needs cues to attend visually  -EE      Visual Impairment Comment Demonstrates preference for R gaze, able to turn head to midline and to L with cueing  -EE      Recorded by [EE] Saritha Way, PT      Cognitive Assessment/Intervention    Current Cognitive/Communication " Assessment functional  -EE      Orientation Status oriented x 4  -EE      Follows Commands/Answers Questions 100% of the time  -EE      Personal Safety mild impairment;decreased insight to deficits  -EE      Personal Safety Interventions fall prevention program maintained;gait belt;muscle strengthening facilitated;nonskid shoes/slippers when out of bed;supervised activity  -EE      Recorded by [EE] Saritha Way, PT      Bed Mobility, Assessment/Treatment    Bed Mobility, Assistive Device bed rails;head of bed elevated  -EE      Bed Mob, Supine to Sit, [place] contact guard assist;verbal cues required  -EE      Bed Mob, Sit to Supine, [place] supervision required  -EE      Bed Mobility, Impairments strength decreased;pain;motor control impaired;coordination impaired  -EE      Recorded by [EE] Saritha Way, PT      Transfer Assessment/Treatment    Transfers, Sit-Stand [place] contact guard assist;verbal cues required;nonverbal cues required (demo/gesture);hand held assist  -EE      Transfers, Stand-Sit [place] contact guard assist;verbal cues required;hand held assist  -EE      Transfer, Safety Issues step length decreased;balance decreased during turns  -EE      Transfer, Impairments strength decreased;impaired balance;coordination impaired;motor control impaired;pain  -EE      Recorded by [EE] Saritha Way, PT      Gait Assessment/Treatment    Gait, [place] Level contact guard assist;minimum assist (75% patient effort);verbal cues required;nonverbal cues required (demo/gesture);hand held assist  -EE      Gait, Distance (Feet) 400  -EE      Gait, Gait Deviations ronnell decreased;leans to the left;ataxia;narrow base;weight-shifting ability decreased;step length decreased  -EE      Gait, Safety Issues step length decreased;weight-shifting ability decreased;balance decreased during turns  -EE      Gait, Impairments strength decreased;impaired balance;coordination impaired;impaired vision;motor  control impaired  -EE      Gait, Comment Pt assisted by holding R elbow; avoided weightbearing on R hand/wrist. Verbal cues to scan to L and avoid obstacles on L sided during ambulation.  -EE      Recorded by [EE] Saritha Way, PT      Positioning and Restraints    Pre-Treatment Position in bed  -EE      Post Treatment Position bed  -EE      In Bed fowlers;call light within reach;encouraged to call for assist;exit alarm on;with family/caregiver;notified nsg;SCD pump applied;LUE elevated  -EE      Recorded by [EE] Saritha Way, PT        User Key  (r) = Recorded By, (t) = Taken By, (c) = Cosigned By    Initials Name Effective Dates    AW Diane Nelson, MS CCC-SLP 04/13/15 -     EE Saritha Way, PT 12/01/15 -     SA Heather Amin, MS CCC-SLP 07/25/17 -     HC Vida Mcgarry, OTR 08/17/17 -                 OT Goals       12/05/17 1223          Transfer Training OT LTG    Transfer Training OT LTG, Date Established 12/05/17  -HC      Transfer Training OT LTG, Time to Achieve 1 wk  -HC      Transfer Training OT LTG, Activity Type bed to chair /chair to bed;toilet;sit to stand/stand to sit  -HC      Transfer Training OT LTG, [place] Level supervision required  -HC      Coordination OT LTG    Coordination OT LTG, Date Established 12/05/17  -HC      Coordination OT LTG, Time to Achieve 1 wk  -HC      Coordination OT LTG, Activity Type FM task;GM task  -HC      Coordination OT LTG, [place] Level min assist;with verbal cues;setup  -HC      ADL OT LTG    ADL OT LTG, Date Established 12/05/17  -HC      ADL OT LTG, Time to Achieve 1 wk  -HC      ADL OT LTG, Activity Type ADL skills  -HC      ADL OT LTG, [place] Level min assist;contact guard  -HC      Endurance OT LTG    Endurance Goal OT LTG, Date Established 12/05/17  -HC      Endurance Goal OT LTG, Time to Achieve 1 wk  -HC      Endurance Goal OT LTG, Activity Level endurance 2 fair+  -HC        User Key  (r) = Recorded By, (t) = Taken By, (c) = Cosigned By    Initials  Name Provider Type     BRANT Hicks Occupational Therapist          Occupational Therapy Education     Title: PT OT SLP Therapies (Active)     Topic: Occupational Therapy (Active)     Point: ADL training (Active)    Description: Instruct learner(s) on proper safety adaptation and remediation techniques during self care or transfers.   Instruct in proper use of assistive devices.    Learning Progress Summary    Learner Readiness Method Response Comment Documented by Status   Patient Eager E NR   12/05/17 1222 Active               Point: Precautions (Active)    Description: Instruct learner(s) on prescribed precautions during self-care and functional transfers.    Learning Progress Summary    Learner Readiness Method Response Comment Documented by Status   Patient Eager E NR   12/05/17 1222 Active               Point: Body mechanics (Active)    Description: Instruct learner(s) on proper positioning and spine alignment during self-care, functional mobility activities and/or exercises.    Learning Progress Summary    Learner Readiness Method Response Comment Documented by Status   Patient Eager E NR   12/05/17 1222 Active                      User Key     Initials Effective Dates Name Provider Type [facility] 08/17/17 -  BRANT Hicks Occupational Therapist OT                  OT Recommendation and Plan  Anticipated Discharge Disposition: inpatient rehabilitation facility  Planned Therapy Interventions: activity intolerance, ADL retraining, IADL retraining, balance training, adaptive equipment training, bed mobility training, transfer training, stretching, strengthening, ROM (Range of Motion)  Therapy Frequency: 3-5 times/wk  Plan of Care Review  Plan Of Care Reviewed With: patient  Outcome Summary/Follow up Plan: More alert today and oriented to person and place. Sitting up in bed attempting to color. Progressing functionally and attending to L side and LUE during tasks with cues. Will continue  to follow.        Outcome Measures       12/07/17 1100 12/06/17 1500 12/06/17 0900    How much help from another person do you currently need...    Turning from your back to your side while in flat bed without using bedrails?   3  -EE    Moving from lying on back to sitting on the side of a flat bed without bedrails?   3  -EE    Moving to and from a bed to a chair (including a wheelchair)?   3  -EE    Standing up from a chair using your arms (e.g., wheelchair, bedside chair)?   3  -EE    Climbing 3-5 steps with a railing?   2  -EE    To walk in hospital room?   3  -EE    AM-PAC 6 Clicks Score   17  -EE    How much help from another is currently needed...    Putting on and taking off regular lower body clothing? 3  -HC 3  -HC     Bathing (including washing, rinsing, and drying) 3  -HC 3  -HC     Toileting (which includes using toilet bed pan or urinal) 3  -HC 3  -HC     Putting on and taking off regular upper body clothing 3  -HC 3  -HC     Taking care of personal grooming (such as brushing teeth) 3  -HC 3  -HC     Eating meals 3  -HC 3  -HC     Score 18  -HC 18  -HC     Functional Assessment    Outcome Measure Options AM-PAC 6 Clicks Daily Activity (OT)  -HC AM-PAC 6 Clicks Daily Activity (OT)  -HC AM-PAC 6 Clicks Basic Mobility (PT)  -EE      12/05/17 1200 12/05/17 1100 12/04/17 1500    How much help from another person do you currently need...    Turning from your back to your side while in flat bed without using bedrails?  3  -EE 3  -EE    Moving from lying on back to sitting on the side of a flat bed without bedrails?  3  -EE 3  -EE    Moving to and from a bed to a chair (including a wheelchair)?  3  -EE 3  -EE    Standing up from a chair using your arms (e.g., wheelchair, bedside chair)?  3  -EE 3  -EE    Climbing 3-5 steps with a railing?  2  -EE 2  -EE    To walk in hospital room?  3  -EE 3  -EE    AM-PAC 6 Clicks Score  17  -EE 17  -EE    How much help from another is currently needed...    Putting on and  taking off regular lower body clothing? 3  -HC      Bathing (including washing, rinsing, and drying) 3  -HC      Toileting (which includes using toilet bed pan or urinal) 3  -HC      Putting on and taking off regular upper body clothing 3  -HC      Taking care of personal grooming (such as brushing teeth) 3  -HC      Eating meals 3  -HC      Score 18  -      Modified Alis Scale    Modified [place] Scale 4 - Moderately severe disability.  Unable to walk without assistance, and unable to attend to own bodily needs without assistance.  -      Functional Assessment    Outcome Measure Options AM-PAC 6 Clicks Daily Activity (OT);Modified [place]  -HC AM-PAC 6 Clicks Basic Mobility (PT)  -EE AM-PAC 6 Clicks Basic Mobility (PT)  -EE      User Key  (r) = Recorded By, (t) = Taken By, (c) = Cosigned By    Initials Name Provider Type     Saritha Way, PT Physical Therapist     BRANT Hicks Occupational Therapist           Time Calculation:         Time Calculation- OT       12/07/17 1123          Time Calculation- OT    OT Start Time 1110  -      OT Stop Time 1119  -      OT Time Calculation (min) 9 min  -      OT Received On 12/07/17  -      OT - Next Appointment 12/08/17  -        User Key  (r) = Recorded By, (t) = Taken By, (c) = Cosigned By    Initials Name Provider Type     BRANT Hicks Occupational Therapist           Therapy Charges for Today     Code Description Service Date Service Provider Modifiers Qty    47306218414  OT NEUROMUSC RE EDUCATION EA 15 MIN 12/6/2017 BRANT Hicks GO 1    [id number]  OT NEUROMUSC RE EDUCATION EA 15 MIN 12/7/2017 BRANT Hicks GO 1               BRANT Hicks  12/7/2017

## 2017-12-07 NOTE — PROGRESS NOTES
"DOS: 2017  NAME: Angelita Brambila   : 1965  PCP: Ivonne Bradley MD  Chief Complaint   Patient presents with   • Neuro Deficit(s)     CC: Stroke    Subjective:   Patient appears to be sundowning according to staff  patient becomes very confused and combative. Similar to past 2 nights patient still with confusion requiring posey, Ativan and Seroquel; no ativan required all day again today on dayshift. Patient does well when redirected.    On exam today patient is pleasant and unchanged from exam yesterday. Denies double or blurred vision, dizziness or new numbness or loss or sensation. Patient does report a mild generalized headache.      Objective:  Vital signs: /81 (BP Location: Left arm, Patient Position: Lying)  Pulse 104  Temp 98.1 °F (36.7 °C) (Oral)   Resp 17  Ht 165.1 cm (65\")  Wt 66.2 kg (146 lb)  LMP Comment: hysterectomy   SpO2 94%  BMI 24.3 kg/m2     Physical Exam:  GENERAL: NAD  HEENT: Normocephalic, atraumatic   COR: RRR  Resp: Even and unlabored  Extremities: No signs of distal embolization. Decreased ROM of right wrist secondary to pain, bruising.    Neurological:   MS: AO. Naming and repetition intact. Followed commands. Normal higher integrative function.  CN: II-XII normal except for left facial weakness and left superior temporal HHH. mild dysarthria  Motor: Normal strength on right. LUE with pronator drift  Sensory: Decreased sensation of LUE  Coordination: Normal on right.   NIHSS:    Baseline  0-->Alert: keenly responsive  0-->Answers both questions correctly  0-->Performs both tasks correctly  0=normal  1=Partial hemianopia  2=Partial paralysis (total or near total paralysis of the lower face)  1-->Drift: limb holds 90 (or 45) degrees, but drifts down before full 10 seconds: does not hit bed or other support  0-->No drift: limb holds 90 (or 45) degrees for full 10 secs  1-->Drift: limb holds 90 (or 45) degrees, but drifts down before full 10 seconds: does not hit " bed or other support  0-->No drift: limb holds 90 (or 45) degrees for full 10 secs  1=Present in one limb  1=Mild to moderate sensory loss; patient feels pinprick is less sharp or is dull on the affected side; there is a loss of superficial pain with pinprick but patient is aware She is being touched  1-->Mild-to-moderate aphasia: some obvious loss of fluency or facility of comprehension, without significant limitation on ideas expressed or form of expression. Reduction of speech and/or comprehension, however, makes conversation.  1=Mild to moderate, patient slurs at least some words and at worst, can be understood with some difficulty  0=No abnormality    Total score: 9    Results Review:     I reviewed the patient's new clinical results.    Current Medications:    amLODIPine 10 mg Oral Daily   aspirin 81 mg Oral Daily   atorvastatin 80 mg Oral Nightly   famotidine 20 mg Oral BID   folic acid 1 mg Oral Daily   nicotine 1 patch Transdermal Q24H   pantoprazole 40 mg Oral Daily   QUEtiapine 25 mg Oral Nightly   rOPINIRole 1 mg Oral BID   thiamine 200 mg Oral Daily       sodium chloride 75 mL/hr Last Rate: 75 mL/hr (12/05/17 0840)       Medications Reviewed    Laboratory results:  Lab Results   Component Value Date    GLUCOSE 90 12/06/2017    CALCIUM 9.2 12/06/2017     12/06/2017    K 3.7 12/06/2017    CO2 22.7 12/06/2017     12/06/2017    BUN 5 (L) 12/06/2017    CREATININE 0.57 12/06/2017    EGFRIFNONA 111 12/06/2017    BCR 8.8 12/06/2017    ANIONGAP 16.3 12/06/2017     Lab Results   Component Value Date    WBC 5.94 12/06/2017    HGB 13.6 12/06/2017    HCT 41.1 12/06/2017    .9 (H) 12/06/2017     12/06/2017        Results from last 7 days  Lab Units 12/02/17  0631   CHOLESTEROL mg/dL 184     Lab Results   Component Value Date    INR 1.04 12/04/2017    INR 0.96 12/01/2017    PROTIME 13.2 12/04/2017    PROTIME 12.4 12/01/2017     No results found for: TSH  Lab Results   Component Value Date     LDLCALC 116 (H) 12/02/2017     Lab Results   Component Value Date    HGBA1C 4.81 12/02/2017     No components found for: B12    Review of imaging:  CTA h/n, CTP 12/1/17: IMPRESSION:  1.  The CT perfusion demonstrates abnormal perfusion involving the right  frontal lobe with a core area of abnormal CBF estimated to be 6 mL in  volume. There is delayed perfusion estimated to be 28 mL in volume  resulting in a mismatch ratio of 4.7. However, it should be noted that  this may be under represented as the CT examination demonstrates more  superior lateral areas of abnormal attenuation. There is a small linear  area of increased attenuation noted on the noncontrasted CT examination  suggesting thrombus overlying the right frontal lobe laterally.  2.  The CT angiogram demonstrates approximately 50% stenosis of the  proximal aspect of the internal carotid artery on the right secondary to  markedly irregular noncalcified plaque/thrombus. Intracranially there is  decreased opacification suggesting thrombus involving the small anterior  sylvian branches of the posterior division of the right middle cerebral  artery.  CT head 12/2/17: CT scan was performed through the brain without contrast and compared to  12/01/2017 and now demonstrates areas of hemorrhagic transformation in  the area of acute right MCA infarct. The largest area of hemorrhage is  in the right temporofrontal region and measures up to 1.2 x 1.5 cm.  Other adjacent areas are much less defined and consistent with petechial  hemorrhage. There is very slight mass effect with some effacement of the  cortical sulci, consistent with the infarct. There is also slight  midline shift to the left measuring 2 or 3 mm.  MRI brain 12/3/17: CONCLUSION: Large acute to subacute right MCA distribution infarct with  further areas of hemorrhagic transformation and slight worsening of  associated mass effect when compared to yesterday's CT scan.     TCD Report   Velocities in all  vessels within their receptive ranges and spectral waveforms were unremarkable.     EEG     Impression: Ms. Brambila is a 51 yo with CAD (MI in 2004), HLD, long history of smoking and daily ETOH use who transferred on 12/1 with left side weakness with a NIH of 10; received  IV TPA with hemorrhagic conversion.   Has done well since starting ASA 81mg started yesterday, TCD w/ emboli monitoring;unremarkable. We recommend DOMI to further evaluate CVA. Librium  discontinued concern for DTs minimal at this point. EEG ordered; pending; will follow up.   Consulted pharmacy regarding nightly Seroquel dose requested by nurse; QTC  491; per Faye will order one time dose of Seroquel and repeat EKG in the am. Will add am CT head to revaluate CVA and carotid US to concluded CVA work-up; DOMI no longer suggested.   Continue Lipitor and aspirin. PT/OT/ST. CCP for discharge planning/rehab needs. Please call with questions or concerns. Follow-up in stroke clinic with Nguyễn in 3 months.     Plan:  Repeat CT Head in the am to further evaluate need for anticoagulant therapy   Carotid US; bilateral   Aspirin 81mg   TCD w/emboli monitoring (negative)  Lipitor 80 mg  Hydrate  Neurochecks  Non-pharmacological DVT prophylaxis  EKG Tele  PT/OT/ST  Stroke Education  Blood pressure control to <130/80  Goal LDL <70-recommend high dose statins-   Serum glucose < 140    Call [phone] for stroke/TIA symptoms  CTA head/neck in 3 weeks.   F/U in stroke clinic to be determined pending results of above, call [phone] for questions    I have discussed the above with the patient.     All imaging and labs reviewed with Dr. Adrian and he agrees with radiology impressions and plan. No significant lab abnormalities.     Starr Varner, APRN  12/07/17  1:41 PM      Active Problems:    CVA (cerebral vascular accident)

## 2017-12-07 NOTE — PLAN OF CARE
Problem: Fall Risk (Adult)  Goal: Absence of Falls  Outcome: Ongoing (interventions implemented as appropriate)    Problem: Stroke (Ischemic) (Adult)  Goal: Signs and Symptoms of Listed Potential Problems Will be Absent or Manageable (Stroke)  Outcome: Ongoing (interventions implemented as appropriate)    Problem: Patient Care Overview (Adult)  Goal: Plan of Care Review    12/07/17 0250   Coping/Psychosocial Response Interventions   Plan Of Care Reviewed With patient   Patient Care Overview   Progress improving   Outcome Evaluation   Outcome Summary/Follow up Plan pt. agitated, restless, and confused. states she is in the wrong room. no change after many attempts at redirection. pt continues to try to get oob. order received for meds. pt had episode of sinus tachycardia, stat ekg completed. pt assymptomatic throughout episode. v/s otherwise stable. pt asleep with no s/s distress after prn meds given. will continue to monitor         Problem: Pressure Ulcer Risk (Maury Scale) (Adult,Obstetrics,Pediatric)  Goal: Skin Integrity  Outcome: Ongoing (interventions implemented as appropriate)    Problem: SAFETY - NON-VIOLENT RESTRAINT  Goal: Remains free of injury from restraints (Non-Violent Restraint)  Outcome: Ongoing (interventions implemented as appropriate)  Goal: Free from restraint(s) (Non-Violent Restraint)  Outcome: Ongoing (interventions implemented as appropriate)

## 2017-12-07 NOTE — NURSING NOTE
Call received from ICU stating pt in vfib. Upon entering room, pt alert, without s/s cardiac distress. STAT EKG ordered and showed pt to be in sinus tachycardia. V/S otherwise stable. Will continue to monitor

## 2017-12-07 NOTE — TELEPHONE ENCOUNTER
12/7/17 A 8:00 appt has been opened 12/19 and a 9:00 on 12/26. Please remember that on 12/26 there is no nurse practitioner, he is on [facility] call, and will have to round.

## 2017-12-07 NOTE — PROGRESS NOTES
Dr. JUANA Mathews    [address]    12/7/2017    Patient ID:  Name:  Angelita Brambila  MRN:  [id number]  1965  52 y.o.  female            CC/Reason for visit: Follow-up for stroke    HPI: Patient is doing much better.    Vitals:  Vitals:    12/07/17 0601 12/07/17 0832 12/07/17 0900 12/07/17 1355   BP:  121/81  109/79   BP Location:  Left arm  Left arm   Patient Position:  Lying  Lying   Pulse: 84 104  94   Resp:  17  18   Temp:   98.1 °F (36.7 °C) 98.8 °F (37.1 °C)   TempSrc:   Oral Oral   SpO2: 94% 94%  96%   Weight:       Height:               Body mass index is 24.3 kg/(m^2).  No intake or output data in the 24 hours ending 12/07/17 1746    Exam:  GEN:  No distress,Awake, alert, trying to move the left side of her body which is improving on a daily basis  LUNGS: Clear breath sounds bilat, nonlabored breathing  CV:  Normal S1S2, without murmur, no edema  ABD:  Non tender, no enlarged liver or masses  EXT:  No cyanosis or clubbing    Scheduled meds:    amLODIPine 10 mg Oral Daily   aspirin 81 mg Oral Daily   atorvastatin 80 mg Oral Nightly   famotidine 20 mg Oral BID   folic acid 1 mg Oral Daily   nicotine 1 patch Transdermal Q24H   pantoprazole 40 mg Oral Daily   polyethylene glycol 17 g Oral Daily   QUEtiapine 25 mg Oral Once   rOPINIRole 1 mg Oral BID   thiamine 200 mg Oral Daily     IV meds:                        sodium chloride 75 mL/hr Last Rate: 75 mL/hr (12/05/17 0840)       Data Review:   I reviewed the patient's medications and new clinical results.      Results from last 7 days  Lab Units 12/06/17  0444 12/05/17  2151 12/04/17  0609 12/04/17  0406 12/03/17  0521  12/01/17  1537   SODIUM mmol/L 142  --   --  138 137  --  142   POTASSIUM mmol/L 3.7 3.3*  --  3.5 3.9  < > 2.9*   CHLORIDE mmol/L 103  --   --  103 100  --  98   CO2 mmol/L 22.7  --   --  22.4 23.8  --  30.0*   BUN mg/dL 5*  --   --  6 6  --  5*   CREATININE mg/dL 0.57  --   --  0.57 0.62  < > 0.75   CALCIUM mg/dL 9.2  --    --  7.9* 7.9*  --  8.9   BILIRUBIN mg/dL 0.6  --   --  0.6  --   --  0.8   ALK PHOS U/L 92  --   --  70  --   --  88   ALT (SGPT) U/L 31  --   --  13  --   --  24   AST (SGOT) U/L 61*  --   --  19  --   --  26   GLUCOSE mg/dL 90  --   --  93 105*  --  106*   WBC 10*3/mm3 5.94  --  6.91  --   --   --  9.02   HEMOGLOBIN g/dL 13.6  --  12.3  --   --   --  16.4*   PLATELETS 10*3/mm3 340  --  259  --   --   --  300   INR   --   --   --  1.04  --   --  0.96   PROBNP pg/mL  --   --   --  313.8  --   --   --    PROCALCITONIN ng/mL 0.06*  --   --   --   --   --   --    < > = values in this interval not displayed.              ASSESSMENT:   Acute alcohol withdrawal  CVA (cerebral vascular accident)  Oropharyngeal dysphagia  Left hemiparesis  Hypertension  Hypokalemia  Hemorrhagic transformation of CVA  Compression of brain      PLAN:  The patient continues to improve clinically.  Neuro interventional group is planning on performing CT angiogram head and neck tomorrow and decide on type of anticoagulation depending on those results.  Continue physical therapy efforts and hopefully we can discharge her later tomorrow afternoon depending on findings of imaging        Fadi Mathews MD  12/7/2017

## 2017-12-07 NOTE — TELEPHONE ENCOUNTER
(patient still in-house). Appt made for 12-26 at 9:00. Order for carotid doppler to be done between 12-22 to 12-24.  Information given to [facility] on [facility]. Letter/copy of order mailed to home.

## 2017-12-07 NOTE — PLAN OF CARE
Problem: Patient Care Overview (Adult)  Goal: Plan of Care Review  Outcome: Ongoing (interventions implemented as appropriate)    12/07/17 1446   Coping/Psychosocial Response Interventions   Plan Of Care Reviewed With patient   Outcome Evaluation   Outcome Summary/Follow up Plan Pt requires HHA w/ bed and transfer activities. Pt has increased function in LUE. Pt performed all activites w/o any LOB and required vc to look forward and directed to look at an object in the center of the oscar to prevent only looking and veering to the right. Pt performed standing balance exercises well and only had to be corrected to turn torso towards the wall when side stepping to the left due to tendency to look right.

## 2017-12-07 NOTE — PLAN OF CARE
Problem: Patient Care Overview (Adult)  Goal: Plan of Care Review  Outcome: Ongoing (interventions implemented as appropriate)    12/07/17 1122   Coping/Psychosocial Response Interventions   Plan Of Care Reviewed With patient   Outcome Evaluation   Outcome Summary/Follow up Plan More alert today and oriented to person and place. Sitting up in bed attempting to color. Progressing functionally and attending to L side and LUE during tasks with cues. Will continue to follow.

## 2017-12-07 NOTE — THERAPY EVALUATION
Acute Care - Speech Language Pathology Initial Evaluation  [facility]     Patient Name: Angelita Brambila  : 1965  MRN: [id number]  Today's Date: 2017  Onset of Illness/Injury or Date of Surgery Date: 17            Admit Date: 2017     Speech-Language/Cognitive-Linguistic Evaluation  Subjective: The patient was seen on this date for a Cognitive-Linguistic Evaluation.  Patient was alert and cooperative.    Objective: The patient was assessed utilizing standardized assessment COGNISTAT  Findings were as follows: the pt scored within the average range for orientation, attention, comprehension, repetition, naming, memory, and reasoning of similarities. The pt scored within the mild severity range for calculations and in the severe range for judgement.   In conversation pt presented with mild dysarthria characterized by imprecise articulation, reduced volume, and decreased rate.   Assessment: The patient demonstrated impaired cognition.   Recommendations:   Cognitive-Linguistic Therapy.  Other Recommended Evaluations:     Speech Therapy is recommended. Rationale Improved cognitive-linguistic function for increased independence and safety.   It is anticipated patient will need continued speech-language pathology services at the next level of care.       Visit Dx:    ICD-10-CM ICD-9-CM   1. Cerebrovascular accident (CVA), unspecified mechanism I63.9 434.91   2. Hypokalemia E87.6 276.8   3. Hemiparesis affecting left side as late effect of cerebrovascular accident I69.354 438.20     Patient Active Problem List   Diagnosis   • Complete tear of right rotator cuff   • Precordial pain   • Dyspnea on exertion   • Palpitations   • Essential hypertension   • Hyperlipidemia   • Tobacco abuse   • CVA (cerebral vascular accident)     Past Medical History:   Diagnosis Date   • Allergic rhinitis    • Ankle swelling    • Anxiety    • Arthritis    • Asthma    • Attention deficit hyperactivity disorder    • Back  pain    • Chest pain    • CHF (congestive heart failure)    • COPD (chronic obstructive pulmonary disease)    • Coronary artery disease    • Depression    • Esophageal reflux    • High cholesterol     Reported cholesterol level was high   • History of colonoscopy     Complete colonoscopy   • Hypercholesterolemia    • Hypertension    • IBS (irritable bowel syndrome)    • Insomnia    • Osteopenia    • Palpitations    • Rotator cuff tendonitis    • Seborrheic dermatitis    • Stroke    • Tendinitis, de Quervain's    • Tobacco use    • Vitamin B12 deficiency    • Vitamin D deficiency      Past Surgical History:   Procedure Laterality Date   • COLONOSCOPY     • DIAGNOSTIC LAPAROSCOPY EXPLORATORY LAPAROTOMY     • ELBOW ARTHROPLASTY Right     Elbow surgery   • FRACTURE SURGERY     • HYSTERECTOMY  1999    Including both ovaries   • TONSILLECTOMY     • WRIST SURGERY Left           SLP EVALUATION (last 72 hours)      SLP Evaluation       12/07/17 1030                Rehab Evaluation    Document Type evaluation  -OC        Subjective Information no complaints;agree to therapy  -OC        Patient Effort, Rehab Treatment adequate  -OC        Symptoms Noted During/After Treatment fatigue  -OC        General Information    Patient Profile Review yes  -OC        Precautions/Limitations, Vision WFL;WFL with corrective lenses  -OC        Prior Level of Function- Communication functional in all spheres  -OC        Prior Level of Function- Swallowing no diet consistency restrictions  -OC        Plans/Goals Discussed With patient  -OC        Barriers to Rehab none identified  -OC        Clinical Impression    Prognosis good  -OC        Functional Level At Time Of Evaluation impaired  -OC        Patient's Goals For Discharge return home  -OC        Criteria for Skilled Therapeutic Interventions Met skilled criteria for cognitive linguistic intervention met  -OC        Rehab Potential good, to achieve stated therapy goals  -OC         "Therapy Frequency PRN  -OC        Predicted Duration of Therapy Intervention (days/wks) until discharge  -OC        Expected Duration of Therapy Session (min) 15-30 minutes  -OC        Anticipated Discharge Disposition inpatient rehabilitation facility  -OC        Pain Assessment    Pain Assessment No/denies pain  -OC        Cognitive Assessment/Intervention    Current Cognitive/Communication Assessment impaired  -OC        Orientation Status oriented to;person;place;time  -OC        Follows Commands/Answers Questions 100% of the time;able to follow multi-step instructions;needs increased time  -OC        Cognitive Assessment Intervention    Cognitive Assessment/Treatment Comment Completed COGNISTAT, please see note  -OC        Improve memory skills    Improve memory skills through: recalling related word lists immediately;recalling related word lists with an imposed delay;recalling unrelated word lists immediately;recalling unrelated word lists with an imposed delay;repeat sentence;select a word from a list by exclusion;repeat list in sequential order;visual memory task;listen to a paragraph and answer questions;read a paragraph and answer questions;listen to multiple paragraphs and answer questions;read multiple paragraphs and answer questions;use external memory aid;use written schedule;use memory strategies;recall details of the day;90%;without cues  -OC        Status: Improve memory skills New  -OC        Improve functional problem solving    Improve functional problem solving through: answer questions about ADL problems;answer \"what if\" questions;tell similarity between items;complete analogy;complete basic reasoning task;complete organization/home management task;sequence steps in a task;name items for a task;complete simple math problems;Complete word froblems involving time or money;complete functional math task;90%;complete high level reasoning task;without cues  -OC        Status: Improve functional " problem solving New  -OC          User Key  (r) = Recorded By, (t) = Taken By, (c) = Cosigned By    Initials Name Effective Dates    OC So Penn MA,CCC-SLP 04/05/17 -            EDUCATION  The patient has been educated in the following areas:   Cognitive Impairment.    SLP Recommendation and Plan     Prognosis: good  Rehab Potential: good, to achieve stated therapy goals  Criteria for Skilled Therapeutic Interventions Met: skilled criteria for cognitive linguistic intervention met  Anticipated Discharge Disposition: inpatient rehabilitation facility     Therapy Frequency: PRN  Predicted Duration of Therapy Intervention (days/wks): until discharge  Expected Duration of Therapy Session (min): 15-30 minutes    Plan of Care Review  Plan Of Care Reviewed With: patient  Outcome Summary/Follow up Plan: Cognitive eval completed, recommend motor speech eval.          IP SLP Goals       12/07/17 1030 12/06/17 1113 12/04/17 0915    Safely Consume Diet    Safely Consume Diet- SLP, Activity Level  Patient will improve oral skills for more efficient eating;Patient will improve hyolaryngeal excursion for safer, more efficient swallow;Patient will improve tongue base/pharyngeal wall squeeze for safer, more efficient swallow  -SA     Safely Consume Diet- SLP, Date Goal Reviewed  12/06/17  -SA     Safely Consume Diet- SLP, Outcome  goal ongoing  -SA goal ongoing  -SH    Cognitive Linguistic- Improve Safety and Awareness    Cognitive Linguistic Improve Safety and Awareness- SLP, Date Established 12/07/17  -OC      Cognitive Linguistic Improve Safety and Awareness- SLP, Time to Achieve by discharge  -OC      Cognitive Linguistic Improve Safety and Awareness- SLP, Outcome goal ongoing  -OC        12/02/17 1446          Safely Consume Diet    Safely Consume Diet- SLP, Date Established 12/02/17  -CP      Safely Consume Diet- SLP, Time to Achieve by discharge  -CP        User Key  (r) = Recorded By, (t) = Taken By, (c) = Cosigned By     Initials Name Provider Type    OC So Penn MA,[facility]-SLP Speech and Language Pathologist    MALIKA Park, MS CCC-SLP Speech and Language Pathologist    SH Beronica Devi, MS CCC-SLP Speech and Language Pathologist    SA Heather Amin, MS CCC-SLP Speech and Language Pathologist             SLP Outcome Measures (last 72 hours)      SLP Outcome Measures       12/07/17 1030 12/06/17 1100       SLP Outcome Measures    Outcome Measure Used? Adult NOMS  -OC      FCM Scores    FCM Chosen Swallowing;Memory  -OC Swallowing  -SA     Memory FCM Score 5  -OC        User Key  (r) = Recorded By, (t) = Taken By, (c) = Cosigned By    Initials Name Effective Dates    OC So Penn MA,CCC-SLP 04/05/17 -     SA Heather Amin, MS CCC-SLP 07/25/17 -           Time Calculation:         Time Calculation- SLP       12/07/17 1359          Time Calculation- SLP    SLP Start Time 0930  -OC      SLP Stop Time 1030  -OC      SLP Time Calculation (min) 60 min  -OC      SLP Received On 12/07/17  -OC        User Key  (r) = Recorded By, (t) = Taken By, (c) = Cosigned By    Initials Name Provider Type    OC So Penn MA,[facility]-SLP Speech and Language Pathologist          Therapy Charges for Today     Code Description Service Date Service Provider Modifiers Qty    [id number] [facility] EVAL SPEECH AND PROD W LANG  4 12/7/2017 So Penn MA,CCC-SLP GN 1             ADULT NOMS (last 72 hours)      Adult NOMS       12/07/17 1030 12/06/17 1100             FCM Scores    FCM Chosen Swallowing;Memory  -OC Swallowing  -SA       Memory FCM Score 5  -OC          User Key  (r) = Recorded By, (t) = Taken By, (c) = Cosigned By    Initials Name Effective Dates    LIS Penn MA,CCC-SLP 04/05/17 -     SA Heather Amin, MS CCC-SLP 07/25/17 -                So Penn MA,CCC-SLP  12/7/2017

## 2017-12-07 NOTE — NURSING NOTE
Initial eval started. Patient currently off floor. Will return early A.M. to complete eval. Patient will require insurance precert if appropriate.    Bekah Wilson RN  Rehab Admissions Nurse  [phone]

## 2017-12-08 ENCOUNTER — APPOINTMENT (OUTPATIENT)
Dept: CT IMAGING | Facility: HOSPITAL | Age: 52
End: 2017-12-08

## 2017-12-08 LAB
APTT PPP: 27.7 SECONDS (ref 22.7–35.4)
BASOPHILS # BLD AUTO: 0.01 10*3/MM3 (ref 0–0.2)
BASOPHILS NFR BLD AUTO: 0.2 % (ref 0–1.5)
BH CV XLRA MEAS LEFT CCA RATIO VEL: -68.6 CM/SEC
BH CV XLRA MEAS LEFT DIST CCA EDV: -22.9 CM/SEC
BH CV XLRA MEAS LEFT DIST CCA PSV: -68.6 CM/SEC
BH CV XLRA MEAS LEFT DIST ICA EDV: -32.8 CM/SEC
BH CV XLRA MEAS LEFT DIST ICA PSV: -69.7 CM/SEC
BH CV XLRA MEAS LEFT ICA RATIO VEL: -69.7 CM/SEC
BH CV XLRA MEAS LEFT ICA/CCA RATIO: 1
BH CV XLRA MEAS LEFT MID ICA EDV: -23.6 CM/SEC
BH CV XLRA MEAS LEFT MID ICA PSV: -54.6 CM/SEC
BH CV XLRA MEAS LEFT PROX CCA EDV: 20.5 CM/SEC
BH CV XLRA MEAS LEFT PROX CCA PSV: 76.8 CM/SEC
BH CV XLRA MEAS LEFT PROX ECA EDV: -18.3 CM/SEC
BH CV XLRA MEAS LEFT PROX ECA PSV: -69.2 CM/SEC
BH CV XLRA MEAS LEFT PROX ICA EDV: 17.4 CM/SEC
BH CV XLRA MEAS LEFT PROX ICA PSV: 58.4 CM/SEC
BH CV XLRA MEAS LEFT PROX SCLA EDV: 6.7 CM/SEC
BH CV XLRA MEAS LEFT PROX SCLA PSV: 76.4 CM/SEC
BH CV XLRA MEAS LEFT VERTEBRAL A EDV: 8.1 CM/SEC
BH CV XLRA MEAS LEFT VERTEBRAL A PSV: 42.2 CM/SEC
BH CV XLRA MEAS RIGHT CCA RATIO VEL: -68 CM/SEC
BH CV XLRA MEAS RIGHT DIST CCA EDV: -25.8 CM/SEC
BH CV XLRA MEAS RIGHT DIST CCA PSV: -68 CM/SEC
BH CV XLRA MEAS RIGHT DIST ICA EDV: -31.7 CM/SEC
BH CV XLRA MEAS RIGHT DIST ICA PSV: -73.9 CM/SEC
BH CV XLRA MEAS RIGHT ICA RATIO VEL: -73.9 CM/SEC
BH CV XLRA MEAS RIGHT ICA/CCA RATIO: 1.1
BH CV XLRA MEAS RIGHT MID ICA EDV: -28.1 CM/SEC
BH CV XLRA MEAS RIGHT MID ICA PSV: -68.6 CM/SEC
BH CV XLRA MEAS RIGHT PROX CCA EDV: 7.5 CM/SEC
BH CV XLRA MEAS RIGHT PROX CCA PSV: 81.8 CM/SEC
BH CV XLRA MEAS RIGHT PROX ECA EDV: -18.8 CM/SEC
BH CV XLRA MEAS RIGHT PROX ECA PSV: -69.2 CM/SEC
BH CV XLRA MEAS RIGHT PROX ICA EDV: -18.1 CM/SEC
BH CV XLRA MEAS RIGHT PROX ICA PSV: -55.8 CM/SEC
BH CV XLRA MEAS RIGHT PROX SCLA EDV: 9.4 CM/SEC
BH CV XLRA MEAS RIGHT PROX SCLA PSV: 105 CM/SEC
BH CV XLRA MEAS RIGHT VERTEBRAL A EDV: -19.9 CM/SEC
BH CV XLRA MEAS RIGHT VERTEBRAL A PSV: -43.4 CM/SEC
DEPRECATED RDW RBC AUTO: 62 FL (ref 37–54)
EOSINOPHIL # BLD AUTO: 0.1 10*3/MM3 (ref 0–0.7)
EOSINOPHIL NFR BLD AUTO: 1.7 % (ref 0.3–6.2)
ERYTHROCYTE [DISTWIDTH] IN BLOOD BY AUTOMATED COUNT: 15.1 % (ref 11.7–13)
HCT VFR BLD AUTO: 38.9 % (ref 35.6–45.5)
HGB BLD-MCNC: 12.5 G/DL (ref 11.9–15.5)
IMM GRANULOCYTES # BLD: 0.02 10*3/MM3 (ref 0–0.03)
IMM GRANULOCYTES NFR BLD: 0.3 % (ref 0–0.5)
INR PPP: 1 (ref 0.9–1.1)
LEFT ARM BP: NORMAL MMHG
LYMPHOCYTES # BLD AUTO: 1.63 10*3/MM3 (ref 0.9–4.8)
LYMPHOCYTES NFR BLD AUTO: 27.9 % (ref 19.6–45.3)
MCH RBC QN AUTO: 36.4 PG (ref 26.9–32)
MCHC RBC AUTO-ENTMCNC: 32.1 G/DL (ref 32.4–36.3)
MCV RBC AUTO: 113.4 FL (ref 80.5–98.2)
MONOCYTES # BLD AUTO: 0.39 10*3/MM3 (ref 0.2–1.2)
MONOCYTES NFR BLD AUTO: 6.7 % (ref 5–12)
NEUTROPHILS # BLD AUTO: 3.69 10*3/MM3 (ref 1.9–8.1)
NEUTROPHILS NFR BLD AUTO: 63.2 % (ref 42.7–76)
PLATELET # BLD AUTO: 365 10*3/MM3 (ref 140–500)
PMV BLD AUTO: 10 FL (ref 6–12)
PROTHROMBIN TIME: 12.8 SECONDS (ref 11.7–14.2)
RBC # BLD AUTO: 3.43 10*6/MM3 (ref 3.9–5.2)
RIGHT ARM BP: NORMAL MMHG
WBC NRBC COR # BLD: 5.84 10*3/MM3 (ref 4.5–10.7)

## 2017-12-08 PROCEDURE — 25010000002 HEPARIN (PORCINE) PER 1000 UNITS: Performed by: INTERNAL MEDICINE

## 2017-12-08 PROCEDURE — 25010000002 LORAZEPAM PER 2 MG: Performed by: INTERNAL MEDICINE

## 2017-12-08 PROCEDURE — 93010 ELECTROCARDIOGRAM REPORT: CPT | Performed by: INTERNAL MEDICINE

## 2017-12-08 PROCEDURE — 93005 ELECTROCARDIOGRAM TRACING: CPT | Performed by: NURSE PRACTITIONER

## 2017-12-08 PROCEDURE — L3908 WHO COCK-UP NONMOLDE PRE OTS: HCPCS

## 2017-12-08 PROCEDURE — 70450 CT HEAD/BRAIN W/O DYE: CPT

## 2017-12-08 PROCEDURE — 99232 SBSQ HOSP IP/OBS MODERATE 35: CPT | Performed by: NURSE PRACTITIONER

## 2017-12-08 PROCEDURE — 97110 THERAPEUTIC EXERCISES: CPT

## 2017-12-08 PROCEDURE — 92523 SPEECH SOUND LANG COMPREHEN: CPT

## 2017-12-08 PROCEDURE — 85025 COMPLETE CBC W/AUTO DIFF WBC: CPT | Performed by: INTERNAL MEDICINE

## 2017-12-08 PROCEDURE — 85730 THROMBOPLASTIN TIME PARTIAL: CPT | Performed by: INTERNAL MEDICINE

## 2017-12-08 PROCEDURE — 85610 PROTHROMBIN TIME: CPT | Performed by: INTERNAL MEDICINE

## 2017-12-08 RX ORDER — WARFARIN SODIUM 5 MG/1
5 TABLET ORAL
Status: DISCONTINUED | OUTPATIENT
Start: 2017-12-09 | End: 2017-12-13 | Stop reason: HOSPADM

## 2017-12-08 RX ADMIN — HEPARIN SODIUM 12 UNITS/KG/HR: 10000 INJECTION, SOLUTION INTRAVENOUS at 17:11

## 2017-12-08 RX ADMIN — FOLIC ACID 1 MG: 1 TABLET ORAL at 09:11

## 2017-12-08 RX ADMIN — HYDROCODONE BITARTRATE AND ACETAMINOPHEN 1 TABLET: 5; 325 TABLET ORAL at 17:30

## 2017-12-08 RX ADMIN — ATORVASTATIN CALCIUM 80 MG: 80 TABLET, FILM COATED ORAL at 20:15

## 2017-12-08 RX ADMIN — HYDROCODONE BITARTRATE AND ACETAMINOPHEN 1 TABLET: 5; 325 TABLET ORAL at 09:10

## 2017-12-08 RX ADMIN — ASPIRIN 81 MG: 81 TABLET, CHEWABLE ORAL at 09:12

## 2017-12-08 RX ADMIN — ATORVASTATIN CALCIUM 80 MG: 80 TABLET, FILM COATED ORAL at 00:10

## 2017-12-08 RX ADMIN — ROPINIROLE 1 MG: 1 TABLET, FILM COATED ORAL at 20:15

## 2017-12-08 RX ADMIN — TRAZODONE HYDROCHLORIDE 50 MG: 50 TABLET ORAL at 20:15

## 2017-12-08 RX ADMIN — HYDROCODONE BITARTRATE AND ACETAMINOPHEN 1 TABLET: 5; 325 TABLET ORAL at 02:01

## 2017-12-08 RX ADMIN — Medication 1 TABLET: at 17:16

## 2017-12-08 RX ADMIN — Medication 200 MG: at 09:11

## 2017-12-08 RX ADMIN — NICOTINE 1 PATCH: 21 PATCH, EXTENDED RELEASE TRANSDERMAL at 11:51

## 2017-12-08 RX ADMIN — PANTOPRAZOLE SODIUM 40 MG: 40 TABLET, DELAYED RELEASE ORAL at 09:12

## 2017-12-08 RX ADMIN — LORAZEPAM 1 MG: 2 INJECTION, SOLUTION INTRAMUSCULAR; INTRAVENOUS at 11:58

## 2017-12-08 RX ADMIN — BUTALBITAL, ACETAMINOPHEN, AND CAFFEINE 2 TABLET: 50; 325; 40 TABLET ORAL at 23:02

## 2017-12-08 RX ADMIN — POLYETHYLENE GLYCOL 3350 17 G: 17 POWDER, FOR SOLUTION ORAL at 09:12

## 2017-12-08 RX ADMIN — AMLODIPINE BESYLATE 10 MG: 10 TABLET ORAL at 11:49

## 2017-12-08 RX ADMIN — ROPINIROLE 1 MG: 1 TABLET, FILM COATED ORAL at 13:24

## 2017-12-08 NOTE — PLAN OF CARE
Problem: Patient Care Overview (Adult)  Goal: Plan of Care Review  Outcome: Ongoing (interventions implemented as appropriate)    12/08/17 0888   Coping/Psychosocial Response Interventions   Plan Of Care Reviewed With patient   Outcome Evaluation   Outcome Summary/Follow up Plan Pt tolerated PT well. Emotional feeling out of control during balance activities. Used 1 UE support w/ standing and dynamic balance activities.

## 2017-12-08 NOTE — PLAN OF CARE
Problem: Fall Risk (Adult)  Goal: Absence of Falls  Outcome: Ongoing (interventions implemented as appropriate)    Problem: Stroke (Ischemic) (Adult)  Goal: Signs and Symptoms of Listed Potential Problems Will be Absent or Manageable (Stroke)  Outcome: Ongoing (interventions implemented as appropriate)    Problem: Patient Care Overview (Adult)  Goal: Plan of Care Review  Outcome: Ongoing (interventions implemented as appropriate)    12/08/17 0605   Coping/Psychosocial Response Interventions   Plan Of Care Reviewed With patient   Patient Care Overview   Progress improving   Outcome Evaluation   Outcome Summary/Follow up Plan pt alert and oriented x4 all shift, with bouts of mild confusion. Rested comfortably throughout most of shift. Went for CT this am. will continue to monitor         Problem: Pressure Ulcer Risk (Maury Scale) (Adult,Obstetrics,Pediatric)  Goal: Skin Integrity  Outcome: Ongoing (interventions implemented as appropriate)

## 2017-12-08 NOTE — SIGNIFICANT NOTE
12/08/17 1501   Rehab Treatment   Discipline occupational therapist   Treatment Not Performed other (see comments)  (Pt peacefully sleeping in no acute distress. Will attempt again tomorrow.)   Recommendation   OT - Next Appointment 12/09/17

## 2017-12-08 NOTE — PROGRESS NOTES
Continued Stay Note  [facility]     Patient Name: Angelita Brambila  MRN: [id number]  Today's Date: 12/8/2017    Admit Date: 12/1/2017          Discharge Plan       12/08/17 1526    Case Management/Social Work Plan    Additional Comments Per Seble Hernandez, insurance will NOT pay for both a wheelchair and a walker.  CCP to order wheelchair and have family  walker if needed at local store.  Staff will need to fax order for w/c to [employer] once verbal order is cosigned in [facility].        12/08/17 1507    Case Management/Social Work Plan    Plan Home w/ jaspal [facility]'s home.  Home health to follow for d/c orders (for therapy and SN)    Patient/Family In Agreement With Plan yes    Additional Comments No beds at [facility] and [facility] declined.  Called [facility] and updated.  Requesting walker/ & wheelchair.  Discussing poss home health.  CCP to follow & assist.                   Jessica Ward RN

## 2017-12-08 NOTE — PLAN OF CARE
Problem: Patient Care Overview (Adult)  Goal: Plan of Care Review    12/08/17 1557   Coping/Psychosocial Response Interventions   Plan Of Care Reviewed With patient   Patient Care Overview   Progress improving   Outcome Evaluation   Outcome Summary/Follow up Plan Motor speech evaluation completed. Pt quick to fatigue, but was sleeping upon SLP's arrival. Pt presents with mild-moderate dysarthria.

## 2017-12-08 NOTE — THERAPY EVALUATION
Acute Care - Speech Language Pathology Initial Evaluation  [facility]     Patient Name: Angelita Brambila  : 1965  MRN: [id number]  Today's Date: 2017  Onset of Illness/Injury or Date of Surgery Date: 17            Admit Date: 2017     Speech-Language/Cognitive-Linguistic Evaluation  Subjective: The patient was seen on this date for a Motor Speech Evaluation.  Pt was quick to fatigue, SLP woke patient for assessment..    The patient's history is significant for R MCA stroke. Pt with L facial weakness and mild lingual weakness. Poor labial seal and reduced mouth opening. Pt with upper dentures and her own bottom teeth.   Objective: The patient was assessed utilizing informal motor speech eval Findings were as follows:  Dysarthria assessment:  The patient demonstrated impairment of motor speech. Respiration was inadequate in vowel prolongation, counting and conversational speech.Characteristics of reduced respiratory support included reduced overall loudness, use of short phrases, audible inspiration/stridor and speaking on low air. Vocal quality was impaired. Quality was characterized by strain in all tasks. Resonance was within functional limits. Articulation was impaired characterized by imprecise consonant production   multi syllabic words and sentences.  Prosody and intonation were within functional limits. Rate was within normal limits. Pt also with reduced maximum phonation time (3.75). Characteristics of Mixed Dysarthria were noted.  Assessment: The patient demonstrated impaired speech-language. Deficits were noted in motor speech function - dysarthria.    Recommendations:   Speech-Language Therapy and Cognitive-Linguistic Therapy.  Speech Therapy is recommended. Rationale: to improve motor speech    It is anticipated patient will need continued speech-language pathology services at the next level of care.         Visit Dx:    ICD-10-CM ICD-9-CM   1. Cerebrovascular accident (CVA),  unspecified mechanism I63.9 434.91   2. Hypokalemia E87.6 276.8   3. Hemiparesis affecting left side as late effect of cerebrovascular accident I69.354 438.20     Patient Active Problem List   Diagnosis   • Complete tear of right rotator cuff   • Precordial pain   • Dyspnea on exertion   • Palpitations   • Essential hypertension   • Hyperlipidemia   • Tobacco abuse   • CVA (cerebral vascular accident)     Past Medical History:   Diagnosis Date   • Allergic rhinitis    • Ankle swelling    • Anxiety    • Arthritis    • Asthma    • Attention deficit hyperactivity disorder    • Back pain    • Chest pain    • CHF (congestive heart failure)    • COPD (chronic obstructive pulmonary disease)    • Coronary artery disease    • Depression    • Esophageal reflux    • High cholesterol     Reported cholesterol level was high   • History of colonoscopy     Complete colonoscopy   • Hypercholesterolemia    • Hypertension    • IBS (irritable bowel syndrome)    • Insomnia    • Osteopenia    • Palpitations    • Rotator cuff tendonitis    • Seborrheic dermatitis    • Stroke    • Tendinitis, de Quervain's    • Tobacco use    • Vitamin B12 deficiency    • Vitamin D deficiency      Past Surgical History:   Procedure Laterality Date   • COLONOSCOPY     • DIAGNOSTIC LAPAROSCOPY EXPLORATORY LAPAROTOMY     • ELBOW ARTHROPLASTY Right     Elbow surgery   • FRACTURE SURGERY     • HYSTERECTOMY  1999    Including both ovaries   • TONSILLECTOMY     • WRIST SURGERY Left           SLP EVALUATION (last 72 hours)      SLP Evaluation       12/08/17 1551 12/07/17 1030             Rehab Evaluation    Document Type evaluation  -SH evaluation  -OC       Subjective Information no complaints  -SH no complaints;agree to therapy  -OC       Patient Effort, Rehab Treatment good  -SH adequate  -OC       Symptoms Noted During/After Treatment fatigue  -SH fatigue  -OC       General Information    Patient Profile Review yes  -SH yes  -OC       Current Diet Limitations  mechanical soft;thin liquids  -SH        Precautions/Limitations, Vision  WFL;WFL with corrective lenses  -OC       Prior Level of Function- Communication functional in all spheres  -SH functional in all spheres  -OC       Prior Level of Function- Swallowing no diet consistency restrictions  -SH no diet consistency restrictions  -OC       Plans/Goals Discussed With patient  -SH patient  -OC       Barriers to Rehab cognitive status  -SH none identified  -OC       Living Environment    Lives With alone  -SH        Living Arrangements house  -SH        Clinical Impression    SLP Diagnosis Miild-moderate dysarthria  -SH        Prognosis good  -SH good  -OC       Functional Level At Time Of Evaluation impaired  -SH impaired  -OC       Patient's Goals For Discharge return home  -SH return home  -OC       Criteria for Skilled Therapeutic Interventions Met skilled criteria for speech language intervention met  -SH skilled criteria for cognitive linguistic intervention met  -OC       Rehab Potential good, to achieve stated therapy goals  -SH good, to achieve stated therapy goals  -OC       Therapy Frequency PRN  -SH PRN  -OC       Predicted Duration of Therapy Intervention (days/wks) until discharge  -SH until discharge  -OC       Expected Duration of Therapy Session (min) 15-30 minutes  -SH 15-30 minutes  -OC       Anticipated Discharge Disposition inpatient rehabilitation facility  -SH inpatient rehabilitation facility  -OC       Pain Assessment    Pain Assessment No/denies pain  -SH No/denies pain  -OC       Cognitive Assessment/Intervention    Current Cognitive/Communication Assessment impaired  -SH impaired  -OC       Orientation Status  oriented to;person;place;time  -OC       Follows Commands/Answers Questions  100% of the time;able to follow multi-step instructions;needs increased time  -OC       Cognitive Assessment Intervention    Cognitive Assessment/Treatment Comment  Completed COGNISTAT, please see note  -OC     "   Communication Treatment Objective and Progress    Dysarthria Treatment Objectives Improve respiratory support;Improve phonation;Improve articulation;Patient will implement strategies  -SH        Improve memory skills    Improve memory skills through:  recalling related word lists immediately;recalling related word lists with an imposed delay;recalling unrelated word lists immediately;recalling unrelated word lists with an imposed delay;repeat sentence;select a word from a list by exclusion;repeat list in sequential order;visual memory task;listen to a paragraph and answer questions;read a paragraph and answer questions;listen to multiple paragraphs and answer questions;read multiple paragraphs and answer questions;use external memory aid;use written schedule;use memory strategies;recall details of the day;90%;without cues  -OC       Status: Improve memory skills  New  -OC       Improve functional problem solving    Improve functional problem solving through:  answer questions about ADL problems;answer \"what if\" questions;tell similarity between items;complete analogy;complete basic reasoning task;complete organization/home management task;sequence steps in a task;name items for a task;complete simple math problems;Complete word froblems involving time or money;complete functional math task;90%;complete high level reasoning task;without cues  -OC       Status: Improve functional problem solving  New  -OC       Improve respiratory support    Improve respiratory support by: increasing length of exhalation;sustaining a vowel sound on exhalation;80%;with inconsistent cues  -SH        Improve phonation    Improve phonation by: using loud speech;80%;with inconsistent cues  -SH        Improve articulation    Improve articulation: by over-articulating at word level;by over-articulating at phrase level;by over-articulating in connected speech;80%;with inconsistent cues  -SH          User Key  (r) = Recorded By, (t) = Taken " By, (c) = Cosigned By    Initials Name Effective Dates    OC So ePnn MA,CCC-SLP 04/05/17 -     SH Beronica Devi MS CCC-SLP 07/13/17 -            EDUCATION  The patient has been educated in the following areas:   Communication Impairment.    SLP Recommendation and Plan  SLP Diagnosis: Miild-moderate dysarthria  Prognosis: good  Rehab Potential: good, to achieve stated therapy goals  Criteria for Skilled Therapeutic Interventions Met: skilled criteria for speech language intervention met  Anticipated Discharge Disposition: inpatient rehabilitation facility     Therapy Frequency: PRN  Predicted Duration of Therapy Intervention (days/wks): until discharge  Expected Duration of Therapy Session (min): 15-30 minutes    Plan of Care Review  Plan Of Care Reviewed With: patient  Progress: improving  Outcome Summary/Follow up Plan: Motor speech evaluation completed. Pt quick to fatigue, but was sleeping upon SLP's arrival. Pt presents with mild-moderate dysarthria.           IP SLP Goals       12/07/17 1030 12/06/17 1113 12/04/17 0915    Safely Consume Diet    Safely Consume Diet- SLP, Activity Level  Patient will improve oral skills for more efficient eating;Patient will improve hyolaryngeal excursion for safer, more efficient swallow;Patient will improve tongue base/pharyngeal wall squeeze for safer, more efficient swallow  -SA     Safely Consume Diet- SLP, Date Goal Reviewed  12/06/17  -SA     Safely Consume Diet- SLP, Outcome  goal ongoing  -SA goal ongoing  -SH    Cognitive Linguistic- Improve Safety and Awareness    Cognitive Linguistic Improve Safety and Awareness- SLP, Date Established 12/07/17  -OC      Cognitive Linguistic Improve Safety and Awareness- SLP, Time to Achieve by discharge  -OC      Cognitive Linguistic Improve Safety and Awareness- SLP, Outcome goal ongoing  -OC        12/02/17 1446          Safely Consume Diet    Safely Consume Diet- SLP, Date Established 12/02/17  -CP      Safely Consume Diet-  SLP, Time to Achieve by discharge  -CP        User Key  (r) = Recorded By, (t) = Taken By, (c) = Cosigned By    Initials Name Provider Type    OC So Penn MA,[facility]SLP Speech and Language Pathologist    MALIKA Park, MS CCC-SLP Speech and Language Pathologist     Beronica WONG Marito, MS CCC-SLP Speech and Language Pathologist    SA Heather Amin, MS CCC-SLP Speech and Language Pathologist             SLP Outcome Measures (last 72 hours)      SLP Outcome Measures       12/08/17 1500 12/07/17 1030 12/06/17 1100    SLP Outcome Measures    Outcome Measure Used? Adult NOMS  -SH Adult NOMS  -OC     FCM Scores    FCM Chosen Motor Speech  -SH Swallowing;Memory  -OC Swallowing  -SA    Motor Speech FCM Score 5  -SH      Memory FCM Score  5  -OC       User Key  (r) = Recorded By, (t) = Taken By, (c) = Cosigned By    Initials Name Effective Dates    OC So Penn MACCC-SLP 04/05/17 -      Beronica Devi, MS CCC-SLP 07/13/17 -     SA Heather Amin, MS CCC-SLP 07/25/17 -           Time Calculation:         Time Calculation- SLP       12/08/17 1600          Time Calculation- [facility]    SLP Start Time 1500  -      SLP Stop Time 1600  -      SLP Time Calculation (min) 60 min  -      SLP Received On 12/08/17  -        User Key  (r) = Recorded By, (t) = Taken By, (c) = Cosigned By    Initials Name Provider Type     Beronica WONG Marito, MS CCC-SLP Speech and Language Pathologist          Therapy Charges for Today     Code Description Service Date Service Provider Modifiers Qty    [id number]  ST EVAL SPEECH AND PROD W LANG  4 12/8/2017 Beronica JUDITH Devi, MS CCC-SLP GN 1             ADULT NOMS (last 72 hours)      Adult NOMS       12/08/17 1500 12/07/17 1030 12/06/17 1100          FCM Scores    FCM Chosen Motor Speech  -SH Swallowing;Memory  -OC Swallowing  -SA      Motor Speech FCM Score 5  -SH        Memory FCM Score  5  -OC         User Key  (r) = Recorded By, (t) = Taken By, (c) = Cosigned By    Initials Name Effective Dates    OC  So Penn MA,CCC-SLP 04/05/17 -      Beronica Devi, MS CCC-SLP 07/13/17 -     SA Heather Amin, MS CCC-SLP 07/25/17 -                Beronica Devi, MS CCC-SLP  12/8/2017

## 2017-12-08 NOTE — PROGRESS NOTES
Continued Stay Note  [facility]     Patient Name: Angelita Brambila  MRN: [id number]  Today's Date: 12/8/2017    Admit Date: 12/1/2017          Discharge Plan       12/08/17 1550    Case Management/Social Work Plan    Additional Comments Order for wheelchair faxed to [employer] for weekend delivery to patient's room.           Jessica Ward RN

## 2017-12-08 NOTE — PROGRESS NOTES
Continued Stay Note  [facility]     Patient Name: Angelita Brambila  MRN: [id number]  Today's Date: 12/8/2017    Admit Date: 12/1/2017          Discharge Plan       12/08/17 0955    Case Management/Social Work Plan    [facility] would require insurance precert.  Possibly home w/ family.      Patient/Family In Agreement With Plan yes    Additional Comments Call to [facility] [phone] and discussed possibility that insurance will not precert for skilled inpt rehab.  Patient is ambulating 200ft with PT.  Letty to discuss w/ other family members and call CCP.                   Jessica Ward RN

## 2017-12-08 NOTE — NURSING NOTE
Asked by nursing tu [facility]man pt. Had already evaluated  pt and discussed discharge plan with dgt Letty. She works full time and is not able to hire asst while she is gone. Anticipate pt would still need someone there 24/7 with her after dc for safety.  Recommended subacute.     Shanon SanchesRN   Acute Rehab Admission Nurse

## 2017-12-08 NOTE — THERAPY TREATMENT NOTE
"Acute Care - Physical Therapy Treatment Note  [facility]     Patient Name: Angelita Brambila  : 1965  MRN: [id number]  Today's Date: 2017  Onset of Illness/Injury or Date of Surgery Date: 17  Date of Referral to PT: 17  Referring Physician: Rajendra    Admit Date: 2017    Visit Dx:    ICD-10-CM ICD-9-CM   1. Cerebrovascular accident (CVA), unspecified mechanism I63.9 434.91   2. Hypokalemia E87.6 276.8   3. Hemiparesis affecting left side as late effect of cerebrovascular accident I69.354 438.20     Patient Active Problem List   Diagnosis   • Complete tear of right rotator cuff   • Precordial pain   • Dyspnea on exertion   • Palpitations   • Essential hypertension   • Hyperlipidemia   • Tobacco abuse   • CVA (cerebral vascular accident)               Adult Rehabilitation Note       17 0800 17 1400 17 1100    Rehab Assessment/Intervention    Discipline physical therapy assistant  -RW other (see comments)   physical therapist assistant student  -EE,LB,EE2 occupational therapist  -HC    Document Type therapy note (daily note)  -RW therapy note (daily note)  -EE,LB,EE2 therapy note (daily note)  -HC    Subjective Information agree to therapy;no complaints  -RW agree to therapy  -EE,LB,EE2 agree to therapy  -HC    Patient Effort, Rehab Treatment good  -RW good  -EE,LB,EE2 good  -HC    Symptoms Noted During/After Treatment  none  -EE,LB,EE2 none  -HC    Symptoms Noted Comment   \"I'm trying to use my left hand with everything.\" \"I want to go home.\"  -HC    Precautions/Limitations fall precautions  -RW fall precautions  -EE,LB,EE2 fall precautions  -HC    Recorded by [RW] Laura Arteaga, PTA [EE,LB,EE2] Saritha Way, PT (r) Krysten Bradley PTA (t) Saritha Way, PT (c) [HC] BRANT Hicks    Pain Assessment    Pain Assessment No/denies pain  -RW Akers-Evangelista FACES  -EE,LB,EE2 No/denies pain  -HC    Akers-Evangelista FACES Pain Rating  2  -EE,LB,EE2     Pain Type  Acute pain  -EE,LB,EE2 "     Pain Location  Wrist  -EE,LB,EE2     Pain Orientation  Right  -EE,LB,EE2     Pain Descriptors  Sore;Aching  -EE,LB,EE2     Pain Intervention(s)  Repositioned;Ambulation/increased activity  -EE,LB,EE2     Response to Interventions  tolerated  -EE,LB,EE2     Recorded by [RW] Laura Arteaga PTA [EE,LB,EE2] Saritha Way, PT (r) Krysten Bradley PTA (t) Saritha Way PT (c) [HC] Vida Mcgarry OTR    Vision Assessment/Intervention    Visual Impairment inattention to the left;left neglect  -RW  inattention to the left;left neglect;needs cues to attend visually  -HC    Recorded by [RW] Laura Arteaga PTA  [HC] BRANT Hicks    Cognitive Assessment/Intervention    Current Cognitive/Communication Assessment impaired  -RW impaired  -EE,LB,EE2 impaired  -HC    Orientation Status oriented to;person;place  -RW oriented to;person;place;situation  -EE,LB,EE2 oriented to;person;place  -HC    Follows Commands/Answers Questions 100% of the time;needs cueing  -RW 75% of the time  -EE,LB,EE2 75% of the time;able to follow single-step instructions;needs cueing;needs increased time;needs repetition  -HC    Personal Safety mild impairment;decreased awareness, need for assist;decreased awareness, need for safety  -RW moderate impairment  -EE,LB,EE2 moderate impairment  -HC    Personal Safety Interventions fall prevention program maintained;gait belt;nonskid shoes/slippers when out of bed  -RW fall prevention program maintained;gait belt;nonskid shoes/slippers when out of bed;supervised activity  -EE,LB,EE2 fall prevention program maintained;gait belt;nonskid shoes/slippers when out of bed  -HC    Recorded by [RW] Laura Arteaga PTA [EE,LB,EE2] Saritha Way, PT (r) Krysten Bradley PTA (t) Saritha Way PT (c) [HC] Vida Mcgarry, JENAROR    Bed Mobility, Assessment/Treatment    Bed Mobility, Assistive Device  head of bed elevated   HHA  -EE,LB,EE2 head of bed elevated;bed rails  -HC    Bed Mob, Supine to Sit, [place] not tested  -RW  minimum assist (75% patient effort);verbal cues required   HHA  -EE,LB,EE2 contact guard assist  -HC    Bed Mob, Sit to Supine, Falls not tested  -RW minimum assist (75% patient effort);verbal cues required   Help w/ LUE  -EE,LB,EE2 not tested  -HC    Bed Mobility, Safety Issues  decreased use of arms for pushing/pulling   LUE  -EE,LB,EE2     Bed Mobility, Impairments  strength decreased;coordination impaired  -EE,LB,EE2     Bed Mobility, Comment Pt up in chair  -RW      Recorded by [RW] Laura Arteaga PTA [EE,LB,EE2] Saritha Way, PT (r) Krysten Bradley PTA (t) Saritha Way PT (c) [HC] BRANT Hicks    Transfer Assessment/Treatment    Transfers, Bed-Chair [place]   contact guard assist;verbal cues required  -HC    Transfers, Chair-Bed Falls   not tested  -HC    Transfers, Sit-Stand [place] contact guard assist  -RW contact guard assist;hand held assist;verbal cues required  -EE,LB,EE2 contact guard assist;verbal cues required  -HC    Transfers, Stand-Sit [place] contact guard assist  -RW hand held assist;verbal cues required  -EE,LB,EE2 contact guard assist;verbal cues required  -HC    Transfers, Sit-Stand-Sit, Assist Device  --   HHA  -EE,LB,EE2     Toilet Transfer, Falls  contact guard assist;verbal cues required  -EE,LB,EE2     Toilet Transfer, Assistive Device  --   HHA  -EE,LB,EE2     Transfer, Safety Issues weight-shifting ability decreased  -RW balance decreased during turns;step length decreased;weight-shifting ability decreased  -EE,LB,EE2     Transfer, Impairments impaired balance  -RW strength decreased;impaired balance;coordination impaired  -EE,LB,EE2     Transfer, Comment   VCs to open eyes  -HC    Recorded by [RW] Laura Arteaga, PTA [EE,LB,EE2] Saritha Way, PT (r) Krysten Bradley PTA (t) Saritha Way PT (c) [HC] Vida Mcgarry, JENAROR    Gait Assessment/Treatment    Gait, Falls Level stand by assist;contact guard assist;verbal cues required  -RW contact  guard assist;verbal cues required  -EE,LB,EE2     Gait, Assistive Device  --   HHA  -EE,LB,EE2     Gait, Distance (Feet) 200  -  -EE,LB,EE2     Gait, Gait Pattern Analysis swing-to gait  -RW swing-through gait  -EE,LB,EE2     Gait, Gait Deviations bilateral:;antalgic;ronnell decreased;narrow base;step length decreased;weight-shifting ability decreased  -RW ronnell decreased;step length decreased;stride length decreased;weight-shifting ability decreased  -EE,LB,EE2     Gait, Safety Issues balance decreased during turns;step length decreased;weight-shifting ability decreased  -RW balance decreased during turns;step length decreased;weight-shifting ability decreased  -EE,LB,EE2     Gait, Impairments strength decreased;impaired balance;motor control impaired  -RW strength decreased;impaired balance;coordination impaired;motor control impaired  -EE,LB,EE2     Gait, Comment Cueing to relax LUE and to look L. Stood on pts L side to force her to pay attention to that side  -RW Pt tends to veer torwards to right and needs vc to look straight ahead, pt tends to look to the right but corrects w/ vc..  -EE,LB,EE2     Recorded by [RW] Laura Arteaga PTA [EE,LB,EE2] Saritha Way PT (r) Krysten Bradley PTA (t) Saritha Way PT (c)     Motor Skills/Interventions    Additional Documentation  Balance Skills Training (Group)  -EE,LB,EE2 Fine Motor Coordination Training (Group)  -HC    Recorded by  [EE,LB,EE2] Saritha Way PT (r) Krysten Bradley PTA (t) Saritha Way PT (c) [HC] BRANT Hicks    Balance Skills Training    Sitting-Level of Assistance  Close supervision  -EE,LB,EE2     Sitting-Balance Support  Feet supported  -EE,LB,EE2     Sitting-Balance Activities  Trunk control activities  -EE,LB,EE2     Sitting # of Minutes  --   30sec  -EE,LB,EE2     Standing-Level of Assistance Contact guard  -RW Contact guard  -EE,LB,EE2     Static Standing Balance Support Right upper extremity supported;No upper extremity supported  -RW  No upper extremity supported  -EE,LB,EE2     Standing-Balance Activities Single Limb Stance   eyes open and closed  -RW Weight Shift R-L  -EE,LB,EE2     Gait Balance-Level of Assistance Contact guard  -RW Contact guard  -EE,LB,EE2     Gait Balance Support Right upper extremity supported  -RW No upper extremity supported  -EE,LB,EE2     Gait Balance Activities backwards;braiding / front;side-stepping;tandem  -RW backwards;side-stepping;tandem  -EE,LB,EE2     Recorded by [RW] Laura Arteaga PTA [EE,LB,EE2] Saritha Way, PT (r) Krysten Bradley PTA (t) Saritha Way PT (c)     Therapy Exercises    Bilateral Lower Extremities AROM:;5 reps;sitting;hip flexion;LAQ  -RW      Recorded by [RW] Laura Arteaga PTA      Fine Motor Coordination Training    Detail (Fine Motor Coordination Training)   Pt incoporating L hand during functional tasks while opening crayon box and stabilizing coloring book with L hand. VCs to attend to L during tasks.  -HC    Recorded by   [HC] BRANT Hicks    Positioning and Restraints    Pre-Treatment Position sitting in chair/recliner  -RW in bed  -EE,LB,EE2 in bed  -HC    Post Treatment Position chair  -RW bed  -EE,LB,EE2 chair  -HC    In Bed  supine;fowlers;call light within reach;encouraged to call for assist;exit alarm on  -EE,LB,EE2     In Chair sitting;call light within reach;encouraged to call for assist;exit alarm on  -RW  sitting;call light within reach;encouraged to call for assist;exit alarm on  -HC    Recorded by [RW] Laura Arteaga PTA [EE,LB,EE2] Saritha Way PT (r) Krysten Bradley PTA (t) Saritha Way PT (c) [HC] BRANT Hicks      12/06/17 1400 12/06/17 1100 12/06/17 0900    Rehab Assessment/Intervention    Discipline occupational therapist  -HC speech language pathologist  -SA physical therapist  -EE    Document Type therapy note (daily note)  -HC therapy note (daily note)  -SA therapy note (daily note)  -EE    Subjective Information agree to therapy  -HC agree to  "therapy  -SA agree to therapy  -EE    Patient Effort, Rehab Treatment good  -HC adequate  -SA good  -EE    Symptoms Noted During/After Treatment none  -HC fatigue  -SA none  -EE    Symptoms Noted Comment Pt still confused using remote as cell phone and searching for cigarrettes.  -HC Nsg stated pt more confused d/t \"detoxing\". Nsg states not a good day for sp/lang/cog eval. Will defer unitl 12/7.   -SA     Precautions/Limitations fall precautions  -HC swallowing precautions;fall precautions  -SA fall precautions  -EE    Specific Treatment Considerations Posey vest on upon arrival, RN confirmed okay to take off during tx and to reapply either in bed or chair.  -HC Posey vest on pt during tx  -SA Posey vest applied; ok for PT per So PABLO. Somewhat lethargic; RN reports pt given ativan overnight  -EE    Patient Response to Treatment  tearful  -SA     Recorded by [HC] BRANT Hicks [SA] Heather Amin MS CCC-SLP [EE] Saritha Way, PT    Pain Assessment    Pain Assessment No/denies pain  -HC  0-10  -EE    Pain Score   4  -EE    Pain Type   Acute pain  -EE    Pain Location   Wrist  -EE    Pain Orientation   Right  -EE    Pain Intervention(s)   Repositioned;Rest  -EE    Response to Interventions   tolerated  -EE    Recorded by [HC] BRANT Hicks  [EE] Saritha Way, PT    Vision Assessment/Intervention    Visual Impairment inattention to the left;left neglect;needs cues to attend visually  -HC      Recorded by [HC] BRANT Hicks      Cognitive Assessment/Intervention    Current Cognitive/Communication Assessment impaired  -HC impaired  -SA impaired  -EE    Orientation Status oriented to;person  -HC oriented to;person;place  -SA oriented to;person  -EE    Follows Commands/Answers Questions 50% of the time;able to follow single-step instructions;needs cueing;needs increased time;needs repetition  -HC 50% of the time;able to follow single-step instructions;needs cueing;needs increased time;needs repetition  -SA " 75% of the time;able to follow single-step instructions;needs cueing;needs repetition  -EE    Personal Safety moderate impairment;at risk behaviors demonstrated;decreased awareness, need for assist;decreased awareness, need for safety;decreased insight to deficits;impulsive  -HC decreased awareness, need for safety;decreased awareness, need for assist;decreased insight to deficits  -SA moderate impairment;at risk behaviors demonstrated;decreased awareness, need for assist;decreased awareness, need for safety;decreased insight to deficits  -EE    Personal Safety Interventions fall prevention program maintained;gait belt;nonskid shoes/slippers when out of bed  -HC fall prevention program maintained  -SA fall prevention program maintained;gait belt;muscle strengthening facilitated;nonskid shoes/slippers when out of bed;supervised activity  -EE    Recorded by [HC] Vida Mcgarry OTR [SA] Heather Amin MS CCC-SLP [EE] Saritha Way, PT    Dysphagia/Swallow Intervention    Dysphagia/Swallow Intervention  Pt appears tolerating diet; mech soft/thin; pt w/ moderate dysarthria; confused. breath sounds clear; po intake fair d/t detoxing per nsg.   -SA     Recorded by  [SA] Heather Amin MS CCC-SLP     Improve oral skills    To Improve Oral Skills, patient will:  Increase lip closure;Increase tongue tip elevation;Increase tongue lateralization;Increase tongue A-P movement;Increase back of tongue control  -SA     Oral Skills Progress  following model;with consistent cues  -SA     Comments: Improve Oral Skills  Attempted active lingual ex's. Pt not able to complete d/t confusion; however, she was able to tolerate passive oral stretches. Completed buccal and labial stretches w/ good tolerance  -SA     Recorded by  [SA] Heather Amin MS CCC-SLP     Improve tongue base & pharyngeal wall squeeze    To improve tongue base & pharyngeal wall squeeze, patient will:  Complete tongue hold swallow  -SA     Tongue Base/Pharyngeal Wall Squeeze  Progress  with consistent cues;0%;following model  -SA     Comments: Improve tongue base & pharyngeal wall squeeze  Unable to complete d/t confusion.  -SA     Recorded by  [SA] Heather Amin MS CCC-SLP     Bed Mobility, Assessment/Treatment    Bed Mobility, Assistive Device head of bed elevated;bed rails  -HC  head of bed elevated;bed rails  -EE    Bed Mob, Supine to Sit, [place] contact guard assist;minimum assist (75% patient effort);verbal cues required;set up required  -HC  contact guard assist;minimum assist (75% patient effort);verbal cues required  -EE    Bed Mob, Sit to Supine, [place] minimum assist (75% patient effort);verbal cues required  -HC  minimum assist (75% patient effort);verbal cues required  -EE    Bed Mobility, Impairments   strength decreased;coordination impaired  -EE    Recorded by [HC] BRANT Hicks  [EE] Saritha Way, SHAYY    Transfer Assessment/Treatment    Transfers, Sit-Stand [place] contact guard assist;verbal cues required  -HC  contact guard assist;minimum assist (75% patient effort);verbal cues required;nonverbal cues required (demo/gesture);hand held assist;1 person + 1 person to manage equipment  -EE    Transfers, Stand-Sit [place] contact guard assist;verbal cues required  -HC  contact guard assist;verbal cues required  -EE    Transfer, Safety Issues   balance decreased during turns;weight-shifting ability decreased  -EE    Transfer, Impairments   strength decreased;impaired balance;coordination impaired;motor control impaired  -EE    Transfer, Comment VCs to open eyes, very lethargic  -      Recorded by [HC] BRANT Hicks  [EE] Saritha Way, PT    Gait Assessment/Treatment    Gait, [place] Level   contact guard assist;minimum assist (75% patient effort);verbal cues required;nonverbal cues required (demo/gesture);hand held assist;1 person + 1 person to manage equipment  -EE    Gait, Distance (Feet)   400  -EE    Gait, Gait Deviations   ronnell  decreased;step length decreased;stride length decreased;weight-shifting ability decreased;forward flexed posture  -EE    Gait, Safety Issues   step length decreased;balance decreased during turns  -EE    Gait, Impairments   strength decreased;impaired balance;coordination impaired;impaired vision;motor control impaired  -EE    Gait, Comment   Verbal cues to scan to L and avoid obstacles on L. Increased forward flexed posture today; cues for upright posture.  -EE    Recorded by   [EE] Saritha Way PT    Functional Mobility    Functional Mobility- Ind. Level contact guard assist;minimum assist (75% patient effort)  -HC      Functional Mobility- Device other (see comments)   HHA  -HC      Functional Mobility-Distance (Feet) --   around room from bed and back  -HC      Recorded by [HC] BRANT Hicks      Therapy Exercises    Left Upper Extremity AROM:;10 reps;supine;elbow flexion/extension;hand pumps;pronation/supination;shoulder extension/flexion   shoulder 3/4 range with scaption; slow motor control  -HC      Recorded by [HC] BRANT Hicks      Positioning and Restraints    Pre-Treatment Position in bed  -HC  in bed  -EE    Post Treatment Position bed  -HC  bed  -EE    In Bed supine;call light within reach;encouraged to call for assist;exit alarm on  -HC  fowlers;call light within reach;encouraged to call for assist;exit alarm on;with nsg  -EE    Restraints reapplied:;vest  -HC  reapplied:;vest  -EE    Recorded by [HC] BRANT Hicks  [EE] Saritha Way, SHAYY      12/05/17 1300 12/05/17 1104       Rehab Assessment/Intervention    Discipline speech language pathologist  -AW physical therapist  -EE     Document Type therapy note (daily note)  -AW therapy note (daily note)  -EE     Subjective Information agree to therapy  -AW agree to therapy;no complaints  -EE     Patient Effort, Rehab Treatment adequate  -AW good  -EE     Symptoms Noted During/After Treatment other (see comments)  -AW none  -EE     Symptoms  "Noted Comment Nursing reported increased confusion. Numerous family members present reporting pt anxious and agitated. Pt stated, \"it's not a good time.\" Pt seen for VFSS f/u and diet tolerance. Will defer cognitive eval today.  -AW      Precautions/Limitations fall precautions;swallowing precautions  -AW fall precautions;other (see comments)   possible R wrist fx  -EE     Recorded by [AW] Diane Nelson MS CCC-SLP [EE] Saritha Way, PT     Pain Assessment    Pain Assessment  0-10  -EE     Pain Score  3  -EE     Pain Type  Chronic pain  -EE     Pain Location  Back  -EE     Pain Orientation  Lower  -EE     Pain Intervention(s)  Repositioned;Ambulation/increased activity  -EE     Response to Interventions  tolerated  -EE     Recorded by  [EE] Saritha Way, SHAYY     Vision Assessment/Intervention    Visual Impairment  inattention to the left;left neglect;needs cues to attend visually  -EE     Recorded by  [EE] Saritha Way, SHAYY     Cognitive Assessment/Intervention    Current Cognitive/Communication Assessment impaired   pt appeared agitated, decreased insight, confused  -AW impaired  -EE     Orientation Status oriented to;person  -AW oriented to;person;place  -EE     Follows Commands/Answers Questions 100% of the time;able to follow single-step instructions  -% of the time;able to follow single-step instructions;needs cueing;needs increased time;needs repetition  -EE     Personal Safety decreased awareness, need for assist;decreased awareness, need for safety  -AW mild impairment;at risk behaviors demonstrated;decreased awareness, need for safety;decreased insight to deficits  -EE     Personal Safety Interventions fall prevention program maintained  -AW fall prevention program maintained;gait belt;nonskid shoes/slippers when out of bed;supervised activity  -EE     Recorded by [AW] Diaen Nelson, MS CCC-SLP [EE] Saritha Way, SHAYY     Dysphagia/Swallow Intervention    Dysphagia/Swallow Intervention Reviewed results of VFSS with " pt and family. Pt and family verbalized understanding of diet and strategies. Pt is tolerating [facility] soft diet, lungs clear.   -AW      Recorded by [AW] Diane Nelson MS [facility]-SLP      Bed Mobility, Assessment/Treatment    Bed Mob, Supine to Sit, [place]  not tested  -EE     Bed Mob, Sit to Supine, [place]  not tested  -EE     Bed Mobility, Comment  up in room with nsg staff, then up in chair  -EE     Recorded by  [EE] Saritha Way PT     Transfer Assessment/Treatment    Transfers, Sit-Stand [place]  contact guard assist;verbal cues required  -EE     Transfers, Stand-Sit [place]  contact guard assist;verbal cues required  -EE     Transfer, Safety Issues  balance decreased during turns  -EE     Transfer, Impairments  strength decreased;impaired balance;coordination impaired  -EE     Transfer, Comment  cues to scan to L during turns to avoid obstacles  -EE     Recorded by  [EE] Saritha Way PT     Gait Assessment/Treatment    Gait, [place] Level  contact guard assist;minimum assist (75% patient effort);verbal cues required  -EE     Gait, Distance (Feet)  400  -EE     Gait, Gait Deviations  ronnell decreased;narrow base;step length decreased;stride length decreased  -EE     Gait, Safety Issues  step length decreased;balance decreased during turns  -EE     Gait, Impairments  strength decreased;impaired balance;impaired vision;coordination impaired  -EE     Gait, Comment  Cueing required to scan to L and avoid obstacles on L side. Able to ambulate and maintain balance w/o assist at R UE today.   -EE     Recorded by  [EE] Saritha Way PT     Positioning and Restraints    Pre-Treatment Position  in bed  -EE     Post Treatment Position  chair  -EE     In Chair  sitting;call light within reach;encouraged to call for assist;exit alarm on;notified nsg  -EE     Recorded by  [EE] Saritha Way PT       User Key  (r) = Recorded By, (t) = Taken By, (c) = Cosigned By    Initials Name Effective Dates    AUGUSTO Contreras  Omar, MS CCC-SLP 04/13/15 -     EE Saritha Way, PT 12/01/15 -     RW Laura Arteaga, PTA 04/06/16 -     SA Heather Amin, MS CCC-SLP 07/25/17 -     HC Vida Mcgarry, OTR 08/17/17 -     LB Krysten Bradley, PTA 11/15/17 -                 IP PT Goals       12/03/17 1128          Bed Mobility PT LTG    Bed Mobility PT LTG, Date Established 12/03/17  -PC      Bed Mobility PT LTG, Time to Achieve 1 wk  -PC      Bed Mobility PT LTG, Activity Type supine to sit/sit to supine  -PC      Bed Mobility PT LTG, [place] Level supervision required  -PC      Transfer Training PT LTG    Transfer Training PT LTG, Date Established 12/03/17  -PC      Transfer Training PT LTG, Time to Achieve 1 wk  -PC      Transfer Training PT LTG, Activity Type sit to stand/stand to sit  -PC      Transfer Training PT LTG, [place] Level contact guard assist  -PC      Gait Training PT LTG    Gait Training Goal PT LTG, Date Established 12/03/17  -PC      Gait Training Goal PT LTG, Time to Achieve 1 wk  -PC      Gait Training Goal PT LTG, [place] Level contact guard assist  -PC      Gait Training Goal PT LTG, Distance to Achieve 150 ft with appropriate assistive device  -PC        User Key  (r) = Recorded By, (t) = Taken By, (c) = Cosigned By    Initials Name Provider Type    PC Haily Champion, PT Physical Therapist          Physical Therapy Education     Title: PT OT SLP Therapies (Active)     Topic: Physical Therapy (Done)     Point: Mobility training (Done)    Learning Progress Summary    Learner Readiness Method Response Comment Documented by Status   Patient Acceptance E,TB,D VU,NR  RW 12/08/17 0853 Done    Acceptance E,TB,D VU,DU,NR  LB 12/07/17 1446 Done    Acceptance E,TB NR  EE 12/06/17 0945 Active    Acceptance E,TB NR  EE 12/05/17 1116 Active    Acceptance E,TB NR  EE 12/04/17 1500 Active    Acceptance E,D NR  PC 12/03/17 1127 Active               Point: Home exercise program (Done)    Learning Progress Summary    Learner Readiness  Method Response Comment Documented by Status   Patient Acceptance E,TB,D VU,NR  RW 12/08/17 0853 Done    Acceptance E,TB,D VU,DU,NR  LB 12/07/17 1446 Done    Acceptance E,D NR  PC 12/03/17 1127 Active               Point: Body mechanics (Done)    Learning Progress Summary    Learner Readiness Method Response Comment Documented by Status   Patient Acceptance E,TB,D VU,NR  RW 12/08/17 0853 Done    Acceptance E,TB,D VU,DU,NR  LB 12/07/17 1446 Done    Acceptance E,TB NR  EE 12/06/17 0945 Active    Acceptance E,TB NR  EE 12/05/17 1116 Active    Acceptance E,TB NR  EE 12/04/17 1500 Active    Acceptance E,D NR  PC 12/03/17 1127 Active               Point: Precautions (Done)    Learning Progress Summary    Learner Readiness Method Response Comment Documented by Status   Patient Acceptance E,TB,D VU,NR  RW 12/08/17 0853 Done    Acceptance E,TB,D VU,DU,NR  LB 12/07/17 1446 Done    Acceptance E,TB NR  EE 12/06/17 0945 Active    Acceptance E,TB NR  EE 12/05/17 1116 Active    Acceptance E,TB NR  EE 12/04/17 1500 Active    Acceptance E,D NR  PC 12/03/17 1127 Active                      User Key     Initials Effective Dates Name Provider Type Discipline    PC 12/01/15 -  Haily Champion, PT Physical Therapist PT    EE 12/01/15 -  Saritha Way, PT Physical Therapist PT    RW 04/06/16 -  Laura Arteaga, PTA Physical Therapy Assistant PT    LB 11/15/17 -  Krysten Bradley PTA PT Student PT                    PT Recommendation and Plan  Anticipated Discharge Disposition: inpatient rehabilitation facility  Planned Therapy Interventions: bed mobility training, balance training, gait training, home exercise program, strengthening, transfer training  PT Frequency: daily  Plan of Care Review  Plan Of Care Reviewed With: patient  Outcome Summary/Follow up Plan: Pt tolerated PT well. Emotional feeling out of control during balance activities. Used 1 UE support w/ standing and dynamic balance activities.           Outcome Measures       12/07/17  1400 12/07/17 1100 12/06/17 1500    How much help from another person do you currently need...    Turning from your back to your side while in flat bed without using bedrails? 3  -EE (r) LB (t) EE (c)      Moving from lying on back to sitting on the side of a flat bed without bedrails? 3  -EE (r) LB (t) EE (c)      Moving to and from a bed to a chair (including a wheelchair)? 3  -EE (r) LB (t) EE (c)      Standing up from a chair using your arms (e.g., wheelchair, bedside chair)? 3  -EE (r) LB (t) EE (c)      Climbing 3-5 steps with a railing? 2  -EE (r) LB (t) EE (c)      To walk in hospital room? 3  -EE (r) LB (t) EE (c)      AM-PAC 6 Clicks Score 17  -EE (r) LB (t)      How much help from another is currently needed...    Putting on and taking off regular lower body clothing?  3  -HC 3  -HC    Bathing (including washing, rinsing, and drying)  3  -HC 3  -HC    Toileting (which includes using toilet bed pan or urinal)  3  -HC 3  -HC    Putting on and taking off regular upper body clothing  3  -HC 3  -HC    Taking care of personal grooming (such as brushing teeth)  3  -HC 3  -HC    Eating meals  3  -HC 3  -HC    Score  18  -HC 18  -HC    Functional Assessment    Outcome Measure Options AM-PAC 6 Clicks Basic Mobility (PT)  -EE (r) LB (t) EE (c) AM-PAC 6 Clicks Daily Activity (OT)  - AM-PAC 6 Clicks Daily Activity (OT)  -      12/06/17 0900 12/05/17 1200 12/05/17 1100    How much help from another person do you currently need...    Turning from your back to your side while in flat bed without using bedrails? 3  -EE  3  -EE    Moving from lying on back to sitting on the side of a flat bed without bedrails? 3  -EE  3  -EE    Moving to and from a bed to a chair (including a wheelchair)? 3  -EE  3  -EE    Standing up from a chair using your arms (e.g., wheelchair, bedside chair)? 3  -EE  3  -EE    Climbing 3-5 steps with a railing? 2  -EE  2  -EE    To walk in hospital room? 3  -EE  3  -EE    AM-PAC 6 Clicks Score 17   -EE  17  -EE    How much help from another is currently needed...    Putting on and taking off regular lower body clothing?  3  -HC     Bathing (including washing, rinsing, and drying)  3  -HC     Toileting (which includes using toilet bed pan or urinal)  3  -HC     Putting on and taking off regular upper body clothing  3  -HC     Taking care of personal grooming (such as brushing teeth)  3  -HC     Eating meals  3  -HC     Score  18  -HC     Modified Alis Scale    Modified Caldwell Scale  4 - Moderately severe disability.  Unable to walk without assistance, and unable to attend to own bodily needs without assistance.  -HC     Functional Assessment    Outcome Measure Options AM-PAC 6 Clicks Basic Mobility (PT)  -EE AM-PAC 6 Clicks Daily Activity (OT);Modified Caldwell  -HC AM-PAC 6 Clicks Basic Mobility (PT)  -EE      User Key  (r) = Recorded By, (t) = Taken By, (c) = Cosigned By    Initials Name Provider Type    EE Saritha Way, PT Physical Therapist    HC Vida Mcgarry, OTR Occupational Therapist    LB Krysten Bradley, PTA PT Student           Time Calculation:         PT Charges       12/08/17 0834          Time Calculation    Start Time 0833  -RW      Stop Time 0848  -RW      Time Calculation (min) 15 min  -RW      PT Received On 12/08/17  -      PT - Next Appointment 12/09/17  -        User Key  (r) = Recorded By, (t) = Taken By, (c) = Cosigned By    Initials Name Provider Type     Laura Arteaga PTA Physical Therapy Assistant          Therapy Charges for Today     Code Description Service Date Service Provider Modifiers Qty    00983755582 HC PT THER PROC EA 15 MIN 12/8/2017 Laura Arteaga PTA GP 1          PT G-Codes  Outcome Measure Options: AM-PAC 6 Clicks Basic Mobility (PT)    Laura Arteaga PTA  12/8/2017

## 2017-12-08 NOTE — PROGRESS NOTES
Dr. JUANA Mathews    [address]    12/8/2017    Patient ID:  Name:  Angelita Brambila  MRN:  [id number]  1965  52 y.o.  female            CC/Reason for visit: Follow-up for stroke    HPI: Patient feels much better.  Continues to improve.  Left-sided weakness is slowly improving.    Vitals:  Vitals:    12/08/17 0900 12/08/17 1149 12/08/17 1349 12/08/17 1713   BP: 108/78 120/82 127/82 132/92   BP Location: Right arm  Right arm Right arm   Patient Position: Sitting  Lying Lying   Pulse:  74 78 83   Resp:   20 18   Temp: 98 °F (36.7 °C)  99.1 °F (37.3 °C) 99.1 °F (37.3 °C)   TempSrc:   Oral Oral   SpO2:   96% 95%   Weight:       Height:               Body mass index is 24.3 kg/(m^2).  No intake or output data in the 24 hours ending 12/08/17 1742    Exam:  GEN:  Awake, alert, has some movement against gravity in the left upper extremity.  Left lower extremity is much stronger.  Has participated with physical therapy today.  Lungs: Clear bilaterally, nonlabored breathing  CV:  Normal S1S2, without murmur, no edema  ABD:  Non tender, no enlarged liver or masses  EXT:  No cyanosis or clubbing      Scheduled meds:    amLODIPine 10 mg Oral Daily   aspirin 81 mg Oral Daily   atorvastatin 80 mg Oral Nightly   folic acid 1 mg Oral Daily   nicotine 1 patch Transdermal Q24H   pantoprazole 40 mg Oral Daily   polyethylene glycol 17 g Oral Daily   QUEtiapine 25 mg Oral Once   rOPINIRole 1 mg Oral BID   thiamine 200 mg Oral Daily   [START ON 12/9/2017] warfarin 5 mg Oral Daily     IV meds:                        heparin (porcine) 12 Units/kg/hr Last Rate: 12 Units/kg/hr (12/08/17 1711)   sodium chloride 75 mL/hr Last Rate: 75 mL/hr (12/05/17 0840)       Data Review:   I reviewed the patient's medications and new clinical results.  Lab Results   Component Value Date    CALCIUM 9.2 12/06/2017    PHOS 3.9 12/06/2017    MG 2.4 12/06/2017    MG 2.2 12/05/2017    MG 2.4 12/04/2017       Results from last 7 days  Lab  "Units 12/08/17  1435 12/06/17  0444 12/05/17  2151 12/04/17  0609 12/04/17  0406 12/03/17  0521   SODIUM mmol/L  --  142  --   --  138 137   POTASSIUM mmol/L  --  3.7 3.3*  --  3.5 3.9   CHLORIDE mmol/L  --  103  --   --  103 100   CO2 mmol/L  --  22.7  --   --  22.4 23.8   BUN mg/dL  --  5*  --   --  6 6   CREATININE mg/dL  --  0.57  --   --  0.57 0.62   CALCIUM mg/dL  --  9.2  --   --  7.9* 7.9*   BILIRUBIN mg/dL  --  0.6  --   --  0.6  --    ALK PHOS U/L  --  92  --   --  70  --    ALT (SGPT) U/L  --  31  --   --  13  --    AST (SGOT) U/L  --  61*  --   --  19  --    GLUCOSE mg/dL  --  90  --   --  93 105*   WBC 10*3/mm3 5.84 5.94  --  6.91  --   --    HEMOGLOBIN g/dL 12.5 13.6  --  12.3  --   --    PLATELETS 10*3/mm3 365 340  --  259  --   --    INR  1.00  --   --   --  1.04  --    PROBNP pg/mL  --   --   --   --  313.8  --    PROCALCITONIN ng/mL  --  0.06*  --   --   --   --        ASSESSMENT:   Acute alcohol withdrawal  CVA (cerebral vascular accident)  Oropharyngeal dysphagia  Left hemiparesis  Hypertension  Hypokalemia  Hemorrhagic transformation of CVA  Compression of brain      PLAN:  The patient is improving steadily.  Doing very well with physical therapy.  CT of the head shows evolving hemorrhagic stroke which is becoming smaller.    Neuro interventional group states the following: \"Based on imaging visualization of thrombus remaining we recommend unfractionated heparin or Lovenox; preferably heparin till INR 2-3 with bridge to coumadin; ideally starting coumadin once INR is therapeutic. Carotid US showed no significant stenosis in both right and left carotid artery.\"    She is now on heparin drip, started about an hour ago, and Coumadin will be started in the morning.  Once Coumadin is therapeutic the patient can go to rehabilitation facility    Fadi Mathews MD  12/8/2017  "

## 2017-12-09 LAB
APTT PPP: 46.9 SECONDS (ref 22.7–35.4)
APTT PPP: 66.2 SECONDS (ref 22.7–35.4)
BASOPHILS # BLD AUTO: 0.01 10*3/MM3 (ref 0–0.2)
BASOPHILS NFR BLD AUTO: 0.1 % (ref 0–1.5)
DEPRECATED RDW RBC AUTO: 60.6 FL (ref 37–54)
EOSINOPHIL # BLD AUTO: 0.13 10*3/MM3 (ref 0–0.7)
EOSINOPHIL NFR BLD AUTO: 1.8 % (ref 0.3–6.2)
ERYTHROCYTE [DISTWIDTH] IN BLOOD BY AUTOMATED COUNT: 14.9 % (ref 11.7–13)
HCT VFR BLD AUTO: 39.3 % (ref 35.6–45.5)
HGB BLD-MCNC: 13 G/DL (ref 11.9–15.5)
IMM GRANULOCYTES # BLD: 0.02 10*3/MM3 (ref 0–0.03)
IMM GRANULOCYTES NFR BLD: 0.3 % (ref 0–0.5)
INR PPP: 1.04 (ref 0.9–1.1)
LYMPHOCYTES # BLD AUTO: 2.38 10*3/MM3 (ref 0.9–4.8)
LYMPHOCYTES NFR BLD AUTO: 33.9 % (ref 19.6–45.3)
MCH RBC QN AUTO: 37.2 PG (ref 26.9–32)
MCHC RBC AUTO-ENTMCNC: 33.1 G/DL (ref 32.4–36.3)
MCV RBC AUTO: 112.6 FL (ref 80.5–98.2)
MONOCYTES # BLD AUTO: 0.47 10*3/MM3 (ref 0.2–1.2)
MONOCYTES NFR BLD AUTO: 6.7 % (ref 5–12)
NEUTROPHILS # BLD AUTO: 4.02 10*3/MM3 (ref 1.9–8.1)
NEUTROPHILS NFR BLD AUTO: 57.2 % (ref 42.7–76)
PLATELET # BLD AUTO: 407 10*3/MM3 (ref 140–500)
PMV BLD AUTO: 10.4 FL (ref 6–12)
PROTHROMBIN TIME: 13.2 SECONDS (ref 11.7–14.2)
RBC # BLD AUTO: 3.49 10*6/MM3 (ref 3.9–5.2)
WBC NRBC COR # BLD: 7.03 10*3/MM3 (ref 4.5–10.7)

## 2017-12-09 PROCEDURE — 85730 THROMBOPLASTIN TIME PARTIAL: CPT | Performed by: INTERNAL MEDICINE

## 2017-12-09 PROCEDURE — 25010000002 HEPARIN (PORCINE) PER 1000 UNITS: Performed by: INTERNAL MEDICINE

## 2017-12-09 PROCEDURE — 85025 COMPLETE CBC W/AUTO DIFF WBC: CPT | Performed by: INTERNAL MEDICINE

## 2017-12-09 PROCEDURE — 85610 PROTHROMBIN TIME: CPT | Performed by: INTERNAL MEDICINE

## 2017-12-09 PROCEDURE — 97110 THERAPEUTIC EXERCISES: CPT

## 2017-12-09 RX ADMIN — ROPINIROLE 1 MG: 1 TABLET, FILM COATED ORAL at 08:43

## 2017-12-09 RX ADMIN — ROPINIROLE 1 MG: 1 TABLET, FILM COATED ORAL at 17:12

## 2017-12-09 RX ADMIN — FOLIC ACID 1 MG: 1 TABLET ORAL at 08:43

## 2017-12-09 RX ADMIN — Medication 1 TABLET: at 12:50

## 2017-12-09 RX ADMIN — PANTOPRAZOLE SODIUM 40 MG: 40 TABLET, DELAYED RELEASE ORAL at 08:42

## 2017-12-09 RX ADMIN — TRAZODONE HYDROCHLORIDE 50 MG: 50 TABLET ORAL at 21:38

## 2017-12-09 RX ADMIN — Medication 200 MG: at 08:43

## 2017-12-09 RX ADMIN — Medication 1 TABLET: at 14:39

## 2017-12-09 RX ADMIN — HEPARIN SODIUM 15 UNITS/KG/HR: 10000 INJECTION, SOLUTION INTRAVENOUS at 19:51

## 2017-12-09 RX ADMIN — AMLODIPINE BESYLATE 10 MG: 10 TABLET ORAL at 08:42

## 2017-12-09 RX ADMIN — HEPARIN SODIUM 15 UNITS/KG/HR: 10000 INJECTION, SOLUTION INTRAVENOUS at 01:23

## 2017-12-09 RX ADMIN — WARFARIN SODIUM 5 MG: 5 TABLET ORAL at 17:12

## 2017-12-09 RX ADMIN — POLYETHYLENE GLYCOL 3350 17 G: 17 POWDER, FOR SOLUTION ORAL at 08:42

## 2017-12-09 RX ADMIN — BUTALBITAL, ACETAMINOPHEN, AND CAFFEINE 2 TABLET: 50; 325; 40 TABLET ORAL at 10:08

## 2017-12-09 RX ADMIN — ATORVASTATIN CALCIUM 80 MG: 80 TABLET, FILM COATED ORAL at 21:37

## 2017-12-09 RX ADMIN — NICOTINE 1 PATCH: 21 PATCH, EXTENDED RELEASE TRANSDERMAL at 11:51

## 2017-12-09 RX ADMIN — ASPIRIN 81 MG: 81 TABLET, CHEWABLE ORAL at 08:43

## 2017-12-09 NOTE — PLAN OF CARE
Problem: Fall Risk (Adult)  Goal: Absence of Falls  Outcome: Ongoing (interventions implemented as appropriate)    Problem: Stroke (Ischemic) (Adult)  Goal: Signs and Symptoms of Listed Potential Problems Will be Absent or Manageable (Stroke)  Outcome: Ongoing (interventions implemented as appropriate)    Problem: Patient Care Overview (Adult)  Goal: Plan of Care Review  Outcome: Ongoing (interventions implemented as appropriate)    12/08/17 2441   Coping/Psychosocial Response Interventions   Plan Of Care Reviewed With patient;daughter   Patient Care Overview   Progress improving   Outcome Evaluation   Outcome Summary/Follow up Plan Anxious at times and had a CIWA of 6, gave Ativan. Started Heparin gtt r/t large thrombus, per RUMA [facility]/Dr. Adrian. Home with niece at d/c, w/c to be delivered tomorrow.        Goal: Discharge Needs Assessment  Outcome: Ongoing (interventions implemented as appropriate)    Problem: Pressure Ulcer Risk (Maury Scale) (Adult,Obstetrics,Pediatric)  Goal: Skin Integrity  Outcome: Ongoing (interventions implemented as appropriate)

## 2017-12-09 NOTE — PLAN OF CARE
Problem: Fall Risk (Adult)  Goal: Absence of Falls  Outcome: Ongoing (interventions implemented as appropriate)    Problem: Stroke (Ischemic) (Adult)  Goal: Signs and Symptoms of Listed Potential Problems Will be Absent or Manageable (Stroke)  Outcome: Ongoing (interventions implemented as appropriate)    Problem: Patient Care Overview (Adult)  Goal: Plan of Care Review  Outcome: Ongoing (interventions implemented as appropriate)    12/09/17 0616   Coping/Psychosocial Response Interventions   Plan Of Care Reviewed With patient   Patient Care Overview   Progress progress toward functional goals as expected   Outcome Evaluation   Outcome Summary/Follow up Plan Pt continues to remain A&O. C/O headache, calm throughout shift.

## 2017-12-09 NOTE — THERAPY TREATMENT NOTE
Acute Care - Physical Therapy Treatment Note  [facility]     Patient Name: Angelita Brambila  : 1965  MRN: [id number]  Today's Date: 2017  Onset of Illness/Injury or Date of Surgery Date: 17  Date of Referral to PT: 17  Referring Physician: Rajendra    Admit Date: 2017    Visit Dx:    ICD-10-CM ICD-9-CM   1. Cerebrovascular accident (CVA), unspecified mechanism I63.9 434.91   2. Hypokalemia E87.6 276.8   3. Hemiparesis affecting left side as late effect of cerebrovascular accident I69.354 438.20     Patient Active Problem List   Diagnosis   • Complete tear of right rotator cuff   • Precordial pain   • Dyspnea on exertion   • Palpitations   • Essential hypertension   • Hyperlipidemia   • Tobacco abuse   • CVA (cerebral vascular accident)               Adult Rehabilitation Note       17 1324 17 0800 17 1400    Rehab Assessment/Intervention    Discipline physical therapist  -JR physical therapy assistant  -RW other (see comments)   physical therapist assistant student  -EE,LB,EE2    Document Type therapy note (daily note)  -JR therapy note (daily note)  -RW therapy note (daily note)  -EE,LB,EE2    Subjective Information no complaints;agree to therapy  -JR agree to therapy;no complaints  -RW agree to therapy  -EE,LB,EE2    Patient Effort, Rehab Treatment good  -JR good  -RW good  -EE,LB,EE2    Symptoms Noted During/After Treatment fatigue  -JR  none  -EE,LB,EE2    Precautions/Limitations  fall precautions  -RW fall precautions  -EE,LB,EE2    Recorded by [JR] Mihai Valenzuela, PT [RW] Laura Arteaga, PTA [EE,LB,EE2] Saritha Way, PT (r) Krysten Bradley, PTA (t) Saritha Way, PT (c)    Pain Assessment    Pain Assessment No/denies pain  -JR No/denies pain  -RW Akers-Evangelista FACES  -EE,LB,EE2    Akers-Evangelista FACES Pain Rating   2  -EE,LB,EE2    Pain Type   Acute pain  -EE,LB,EE2    Pain Location   Wrist  -EE,LB,EE2    Pain Orientation   Right  -EE,LB,EE2    Pain Descriptors    Sore;Aching  -EE,LB,EE2    Pain Intervention(s)   Repositioned;Ambulation/increased activity  -EE,LB,EE2    Response to Interventions   tolerated  -EE,LB,EE2    Recorded by [JR] Mihai Valenzuela, PT [RW] Laura Arteaga PTA [EE,LB,EE2] Saritha Way PT (r) Krysten Bradley PTA (t) Saritha Way PT (c)    Vision Assessment/Intervention    Visual Impairment  inattention to the left;left neglect  -RW     Recorded by  [RW] Laura Arteaga PTA     Cognitive Assessment/Intervention    Current Cognitive/Communication Assessment functional  -JR impaired  -RW impaired  -EE,LB,EE2    Orientation Status oriented x 4  -JR oriented to;person;place  -RW oriented to;person;place;situation  -EE,LB,EE2    Follows Commands/Answers Questions 100% of the time;needs cueing;needs increased time  -% of the time;needs cueing  -RW 75% of the time  -EE,LB,EE2    Personal Safety WNL/WFL  -JR mild impairment;decreased awareness, need for assist;decreased awareness, need for safety  -RW moderate impairment  -EE,LB,EE2    Personal Safety Interventions gait belt;fall prevention program maintained;muscle strengthening facilitated;nonskid shoes/slippers when out of bed   bed alarm was on at beginning of PT  -JR fall prevention program maintained;gait belt;nonskid shoes/slippers when out of bed  -RW fall prevention program maintained;gait belt;nonskid shoes/slippers when out of bed;supervised activity  -EE,LB,EE2    Recorded by [JR] Mihai Valenzuela, PT [RW] Laura Arteaga, PTA [EE,LB,EE2] Saritha Way, PT (r) Krysten Bradley PTA (t) Saritha Way PT (c)    Bed Mobility, Assessment/Treatment    Bed Mobility, Assistive Device   head of bed elevated   HHA  -EE,LB,EE2    Bed Mobility, Scoot/Bridge, [place] supervision required;other (see comments)   ABLE TO BRIDGE IN ORDER TO POSITION IN BED.    -JR      Bed Mob, Supine to Sit, [place] contact guard assist  -JR not tested  -RW minimum assist (75% patient effort);verbal cues required   HHA   -EE,LB,EE2    Bed Mob, Sit to Supine, [place] contact guard assist  -JR not tested  -RW minimum assist (75% patient effort);verbal cues required   Help w/ LUE  -EE,LB,EE2    Bed Mobility, Safety Issues   decreased use of arms for pushing/pulling   LUE  -EE,LB,EE2    Bed Mobility, Impairments   strength decreased;coordination impaired  -EE,LB,EE2    Bed Mobility, Comment  Pt up in chair  -RW     Recorded by [JR] Mihai Valenzuela, PT [RW] Laura Arteaga, PTA [EE,LB,EE2] Saritha Way, PT (r) Krysten Bradley, PTA (t) Saritha Way, PT (c)    Transfer Assessment/Treatment    Transfers, Sit-Stand [place] nonverbal cues required (demo/gesture);contact guard assist  -JR contact guard assist  -RW contact guard assist;hand held assist;verbal cues required  -EE,LB,EE2    Transfers, Stand-Sit [place] contact guard assist;nonverbal cues required (demo/gesture)  -JR contact guard assist  -RW hand held assist;verbal cues required  -EE,LB,EE2    Transfers, Sit-Stand-Sit, Assist Device   --   HHA  -EE,LB,EE2    Toilet Transfer, [place]   contact guard assist;verbal cues required  -EE,LB,EE2    Toilet Transfer, Assistive Device   --   HHA  -EE,LB,EE2    Transfer, Safety Issues  weight-shifting ability decreased  -RW balance decreased during turns;step length decreased;weight-shifting ability decreased  -EE,LB,EE2    Transfer, Impairments impaired balance;strength decreased  -JR impaired balance  -RW strength decreased;impaired balance;coordination impaired  -EE,LB,EE2    Recorded by [JR] Mihai Valenzuela, PT [RW] Laura Arteaga, PTA [EE,LB,EE2] Saritha Way, PT (r) Krysten Bradley, SEAN (t) Saritha Way, PT (c)    Gait Assessment/Treatment    Gait, [place] Level contact guard assist  -JR stand by assist;contact guard assist;verbal cues required  -RW contact guard assist;verbal cues required  -EE,LB,EE2    Gait, Assistive Device   --   HHA  -EE,LB,EE2    Gait, Distance (Feet) 200  -  -  -EE,LB,EE2    Gait,  Gait Pattern Analysis  swing-to gait  -RW swing-through gait  -EE,LB,EE2    Gait, Gait Deviations ronnell decreased;stride length decreased  -JR bilateral:;antalgic;ronnell decreased;narrow base;step length decreased;weight-shifting ability decreased  -RW ronnell decreased;step length decreased;stride length decreased;weight-shifting ability decreased  -EE,LB,EE2    Gait, Safety Issues step length decreased;balance decreased during turns  -JR balance decreased during turns;step length decreased;weight-shifting ability decreased  -RW balance decreased during turns;step length decreased;weight-shifting ability decreased  -EE,LB,EE2    Gait, Impairments strength decreased;impaired balance  -JR strength decreased;impaired balance;motor control impaired  -RW strength decreased;impaired balance;coordination impaired;motor control impaired  -EE,LB,EE2    Gait, Comment  Cueing to relax LUE and to look L. Stood on pts L side to force her to pay attention to that side  -RW Pt tends to veer torwards to right and needs vc to look straight ahead, pt tends to look to the right but corrects w/ vc..  -EE,LB,EE2    Recorded by [JR] Mihai Valenzuela, PT [RW] Laura Arteaga, PTA [EE,LB,EE2] Saritha Way PT (r) Krysten Bradley PTA (t) Saritha Way PT (c)    Stairs Assessment/Treatment    Number of Stairs 12  -JR      Stairs, Handrail Location right side (ascending)  -JR      Stairs, [place] Level contact guard assist  -JR      Stairs, Assistive Device --   NONE  -JR      Stairs, Technique Used step over step (ascending);step over step (descending)   AMBULATES SLOWLY AND CAUTIOUSLY.   -JR      Recorded by [JR] Mihai Valenzuela, PT      Motor Skills/Interventions    Additional Documentation   Balance Skills Training (Group)  -EE,LB,EE2    Recorded by   [EE,LB,EE2] Saritha Way, PT (r) Krysten Bradley PTA (t) Saritha Way PT (c)    Balance Skills Training    Sitting-Level of Assistance   Close supervision  -EE,LB,EE2    Sitting-Balance  Support   Feet supported  -EE,LB,EE2    Sitting-Balance Activities   Trunk control activities  -EE,LB,EE2    Sitting # of Minutes   --   30sec  -EE,LB,EE2    Standing-Level of Assistance Contact guard  -JR Contact guard  -RW Contact guard  -EE,LB,EE2    Static Standing Balance Support No upper extremity supported  -JR Right upper extremity supported;No upper extremity supported  -RW No upper extremity supported  -EE,LB,EE2    Standing-Balance Activities Tandem Stance;Retrieve object from floor;Single Limb Stance   X 5 MIN.   -JR Single Limb Stance   eyes open and closed  -RW Weight Shift R-L  -EE,LB,EE2    Gait Balance-Level of Assistance  Contact guard  -RW Contact guard  -EE,LB,EE2    Gait Balance Support  Right upper extremity supported  -RW No upper extremity supported  -EE,LB,EE2    Gait Balance Activities  backwards;braiding / front;side-stepping;tandem  -RW backwards;side-stepping;tandem  -EE,LB,EE2    Recorded by [JR] Mihai Valenzuela, PT [RW] Laura Arteaga PTA [EE,LB,EE2] Saritha Way PT (r) Krysten Bradley PTA (t) Saritha Way PT (c)    Therapy Exercises    Bilateral Lower Extremities  AROM:;5 reps;sitting;hip flexion;LAQ  -RW     Recorded by  [RW] Laura Arteaga PTA     Positioning and Restraints    Pre-Treatment Position in bed  -JR sitting in chair/recliner  -RW in bed  -EE,LB,EE2    Post Treatment Position bed  -JR chair  -RW bed  -EE,LB,EE2    In Bed notified nsg;supine;call light within reach;encouraged to call for assist;exit alarm on  -JR  supine;fowlers;call light within reach;encouraged to call for assist;exit alarm on  -EE,LB,EE2    In Chair  sitting;call light within reach;encouraged to call for assist;exit alarm on  -RW     Recorded by [JR] Mihai Valenzuela, PT [RW] Laura Arteaga PTA [EE,LB,EE2] Saritha Way PT (r) Krysten Bradley PTA (t) Saritha Way PT (c)      12/07/17 1100 12/06/17 1400       Rehab Assessment/Intervention    Discipline occupational therapist  - occupational therapist  " -HC     Document Type therapy note (daily note)  -HC therapy note (daily note)  -HC     Subjective Information agree to therapy  -HC agree to therapy  -HC     Patient Effort, Rehab Treatment good  -HC good  -HC     Symptoms Noted During/After Treatment none  -HC none  -HC     Symptoms Noted Comment \"I'm trying to use my left hand with everything.\" \"I want to go home.\"  -HC Pt still confused using remote as cell phone and searching for cigarrettes.  -HC     Precautions/Limitations fall precautions  -HC fall precautions  -HC     Specific Treatment Considerations  Posey vest on upon arrival, RN confirmed okay to take off during tx and to reapply either in bed or chair.  -HC     Recorded by [HC] Vida Mcgarry OTR [HC] Vida Mcgarry OTR     Pain Assessment    Pain Assessment No/denies pain  -HC No/denies pain  -HC     Recorded by [HC] Vida Mcgarry OTR [HC] Vida Mcgarry OTR     Vision Assessment/Intervention    Visual Impairment inattention to the left;left neglect;needs cues to attend visually  -HC inattention to the left;left neglect;needs cues to attend visually  -HC     Recorded by [HC] Vida Mcgarry OTR [HC] Vida Mcgarry, JENAROR     Cognitive Assessment/Intervention    Current Cognitive/Communication Assessment impaired  -HC impaired  -HC     Orientation Status oriented to;person;place  -HC oriented to;person  -HC     Follows Commands/Answers Questions 75% of the time;able to follow single-step instructions;needs cueing;needs increased time;needs repetition  -HC 50% of the time;able to follow single-step instructions;needs cueing;needs increased time;needs repetition  -HC     Personal Safety moderate impairment  -HC moderate impairment;at risk behaviors demonstrated;decreased awareness, need for assist;decreased awareness, need for safety;decreased insight to deficits;impulsive  -HC     Personal Safety Interventions fall prevention program maintained;gait belt;nonskid shoes/slippers when out of bed  -HC fall " prevention program maintained;gait belt;nonskid shoes/slippers when out of bed  -HC     Recorded by [HC] Vida Mcgarry OTR [HC] BRANT Hicks     Bed Mobility, Assessment/Treatment    Bed Mobility, Assistive Device head of bed elevated;bed rails  -HC head of bed elevated;bed rails  -HC     Bed Mob, Supine to Sit, [place] contact guard assist  -HC contact guard assist;minimum assist (75% patient effort);verbal cues required;set up required  -     Bed Mob, Sit to Supine, [place] not tested  -HC minimum assist (75% patient effort);verbal cues required  -HC     Recorded by [HC] Vida Mcgarry OTR [HC] BRNAT Hicks     Transfer Assessment/Treatment    Transfers, Bed-Chair [place] contact guard assist;verbal cues required  -HC      Transfers, Chair-Bed [place] not tested  -HC      Transfers, Sit-Stand [place] contact guard assist;verbal cues required  -HC contact guard assist;verbal cues required  -HC     Transfers, Stand-Sit [place] contact guard assist;verbal cues required  -HC contact guard assist;verbal cues required  -HC     Transfer, Comment VCs to open eyes  - VCs to open eyes, very lethargic  -HC     Recorded by [HC] BRANT Hicks [HC] BRANT Hicks     Functional Mobility    Functional Mobility- Ind. Level  contact guard assist;minimum assist (75% patient effort)  -     Functional Mobility- Device  other (see comments)   HHA  -     Functional Mobility-Distance (Feet)  --   around room from bed and back  -HC     Recorded by  [HC] BRANT Hicks     Motor Skills/Interventions    Additional Documentation Fine Motor Coordination Training (Group)  -HC      Recorded by [HC] BRANT Hicks      Therapy Exercises    Left Upper Extremity  AROM:;10 reps;supine;elbow flexion/extension;hand pumps;pronation/supination;shoulder extension/flexion   shoulder 3/4 range with scaption; slow motor control  -HC     Recorded by  [HC] BRANT Hicks     Fine Motor  Coordination Training    Detail (Fine Motor Coordination Training) Pt incoporating L hand during functional tasks while opening crayon box and stabilizing coloring book with L hand. VCs to attend to L during tasks.  -HC      Recorded by [HC] BRANT Hicks      Positioning and Restraints    Pre-Treatment Position in bed  -HC in bed  -HC     Post Treatment Position chair  -HC bed  -HC     In Bed  supine;call light within reach;encouraged to call for assist;exit alarm on  -HC     In Chair sitting;call light within reach;encouraged to call for assist;exit alarm on  -HC      Restraints  reapplied:;vest  -HC     Recorded by [HC] Vida Mcgarry OTR [HC] Vida Mcgarry OTR       User Key  (r) = Recorded By, (t) = Taken By, (c) = Cosigned By    Initials Name Effective Dates    EE Saritha Way, PT 12/01/15 -     JR Mihai Valenzuela, PT 01/07/16 -     RW Laura Arteaga, PTA 04/06/16 -     TRINO Mcgarry, OTR 08/17/17 -     LB Krysten Bradley, PTA 11/15/17 -                 IP PT Goals       12/03/17 1128          Bed Mobility PT LTG    Bed Mobility PT LTG, Date Established 12/03/17  -PC      Bed Mobility PT LTG, Time to Achieve 1 wk  -PC      Bed Mobility PT LTG, Activity Type supine to sit/sit to supine  -PC      Bed Mobility PT LTG, [place] Level supervision required  -PC      Transfer Training PT LTG    Transfer Training PT LTG, Date Established 12/03/17  -PC      Transfer Training PT LTG, Time to Achieve 1 wk  -PC      Transfer Training PT LTG, Activity Type sit to stand/stand to sit  -PC      Transfer Training PT LTG, [place] Level contact guard assist  -PC      Gait Training PT LTG    Gait Training Goal PT LTG, Date Established 12/03/17  -PC      Gait Training Goal PT LTG, Time to Achieve 1 wk  -PC      Gait Training Goal PT LTG, [place] Level contact guard assist  -PC      Gait Training Goal PT LTG, Distance to Achieve 150 ft with appropriate assistive device  -PC        User Key  (r) = Recorded By,  (t) = Taken By, (c) = Cosigned By    Initials Name Provider Type    SHEELA Champion PT Physical Therapist          Physical Therapy Education     Title: PT OT SLP Therapies (Active)     Topic: Physical Therapy (Done)     Point: Mobility training (Done)    Learning Progress Summary    Learner Readiness Method Response Comment Documented by Status   Patient Eager RICKY CANTU   12/09/17 1329 Done    Acceptance E,TB,D VU,NR  RW 12/08/17 0853 Done    Acceptance E,TB,D VU,DU,NR  LB 12/07/17 1446 Done    Acceptance E,TB NR  EE 12/06/17 0945 Active    Acceptance E,TB NR  EE 12/05/17 1116 Active    Acceptance E,TB NR  EE 12/04/17 1500 Active    Acceptance E,D NR  PC 12/03/17 1127 Active               Point: Home exercise program (Done)    Learning Progress Summary    Learner Readiness Method Response Comment Documented by Status   Patient Eager RICKY CANTU   12/09/17 1329 Done    Acceptance E,TB,D VU,NR  RW 12/08/17 0853 Done    Acceptance E,TB,D VU,DU,NR  LB 12/07/17 1446 Done    Acceptance E,D NR  PC 12/03/17 1127 Active               Point: Body mechanics (Done)    Learning Progress Summary    Learner Readiness Method Response Comment Documented by Status   Patient Eager RICKY CANTU   12/09/17 1329 Done    Acceptance E,TB,D VU,NR  RW 12/08/17 0853 Done    Acceptance E,TB,D VU,DU,NR  LB 12/07/17 1446 Done    Acceptance E,TB NR  EE 12/06/17 0945 Active    Acceptance E,TB NR  EE 12/05/17 1116 Active    Acceptance E,TB NR  EE 12/04/17 1500 Active    Acceptance E,D NR  PC 12/03/17 1127 Active               Point: Precautions (Done)    Learning Progress Summary    Learner Readiness Method Response Comment Documented by Status   Patient Eager E RICKY GALVAN   12/09/17 1329 Done    Acceptance E,TB,D VU,NR  RW 12/08/17 0853 Done    Acceptance E,TB,D VU,DU,NR  LB 12/07/17 1446 Done    Acceptance E,TB NR  EE 12/06/17 0945 Active    Acceptance E,TB NR  EE 12/05/17 1116 Active    Acceptance E,TB NR  EE 12/04/17 1500 Active    Acceptance E,D  NR   12/03/17 1127 Active                      User Key     Initials Effective Dates Name Provider Type Discipline    PC 12/01/15 -  Haily Champion, PT Physical Therapist PT    EE 12/01/15 -  Saritha Way, PT Physical Therapist PT    JR 01/07/16 -  Mihai Valenzuela, PT Physical Therapist PT    RW 04/06/16 -  Laura Arteaga, PTA Physical Therapy Assistant PT    LB 11/15/17 -  Krysten Bradley, PTA PT Student PT                    PT Recommendation and Plan  Anticipated Discharge Disposition: inpatient rehabilitation facility  Planned Therapy Interventions: bed mobility training, balance training, gait training, home exercise program, strengthening, transfer training  PT Frequency: daily  Plan of Care Review  Plan Of Care Reviewed With: patient  Progress: improving          Outcome Measures       12/09/17 1329 12/07/17 1400 12/07/17 1100    How much help from another person do you currently need...    Turning from your back to your side while in flat bed without using bedrails? 3  -JR 3  -EE (r) LB (t) EE (c)     Moving from lying on back to sitting on the side of a flat bed without bedrails? 3  -JR 3  -EE (r) LB (t) EE (c)     Moving to and from a bed to a chair (including a wheelchair)? 3  -JR 3  -EE (r) LB (t) EE (c)     Standing up from a chair using your arms (e.g., wheelchair, bedside chair)? 3  -JR 3  -EE (r) LB (t) EE (c)     Climbing 3-5 steps with a railing? 3  -JR 2  -EE (r) LB (t) EE (c)     To walk in hospital room? 3  -JR 3  -EE (r) LB (t) EE (c)     AM-PAC 6 Clicks Score 18  -JR 17  -EE (r) LB (t)     How much help from another is currently needed...    Putting on and taking off regular lower body clothing?   3  -HC    Bathing (including washing, rinsing, and drying)   3  -HC    Toileting (which includes using toilet bed pan or urinal)   3  -HC    Putting on and taking off regular upper body clothing   3  -HC    Taking care of personal grooming (such as brushing teeth)   3  -HC    Eating meals   3  -HC     Score   18  -HC    Functional Assessment    Outcome Measure Options  AM-PAC 6 Clicks Basic Mobility (PT)  -EE (r) LB (t) EE (c) AM-PAC 6 Clicks Daily Activity (OT)  -HC      12/06/17 1500          How much help from another is currently needed...    Putting on and taking off regular lower body clothing? 3  -HC      Bathing (including washing, rinsing, and drying) 3  -HC      Toileting (which includes using toilet bed pan or urinal) 3  -HC      Putting on and taking off regular upper body clothing 3  -HC      Taking care of personal grooming (such as brushing teeth) 3  -HC      Eating meals 3  -HC      Score 18  -HC      Functional Assessment    Outcome Measure Options AM-PAC 6 Clicks Daily Activity (OT)  -HC        User Key  (r) = Recorded By, (t) = Taken By, (c) = Cosigned By    Initials Name Provider Type    EE Saritha Way, PT Physical Therapist    JR Mihai Valenzuela, PT Physical Therapist    HC Vida Mcgarry, OTR Occupational Therapist    LB Krysten Bradley, PTA PT Student           Time Calculation:         PT Charges       12/09/17 1329          Time Calculation    Start Time 1301  -      Stop Time 1329  -      Time Calculation (min) 28 min  -      PT Received On 12/10/17  -      PT - Next Appointment 12/10/17  -        User Key  (r) = Recorded By, (t) = Taken By, (c) = Cosigned By    Initials Name Provider Type    JR Mihai Valenzuela, PT Physical Therapist          Therapy Charges for Today     Code Description Service Date Service Provider Modifiers Qty    90989747203 HC PT THER PROC EA 15 MIN 12/9/2017 Mihai Valenzuela, PT GP 2          PT G-Codes  Outcome Measure Options: AM-PAC 6 Clicks Basic Mobility (PT)    Mihai Valenzuela, PT  12/9/2017

## 2017-12-09 NOTE — PROGRESS NOTES
Dr. DEQUAN Hu    [address]    12/9/2017    Patient ID:  Name:  Angelita Brambila  MRN:  [id number]  1965  52 y.o.  female            CC/Reason for visit: Follow-up for stroke    Interval:  No acute events.  Feels like making progerss.  Seems dedicated to rehab and smoking cessation efforts.    Vitals:  Vitals:    12/09/17 0842 12/09/17 0914 12/09/17 1335 12/09/17 1437   BP: 126/87 123/81 121/90    BP Location:  Left arm Left arm    Patient Position:  Lying Lying    Pulse: 89 85 85    Resp:  20 20    Temp:  98.5 °F (36.9 °C) 98.3 °F (36.8 °C)    TempSrc:  Oral Oral    SpO2:    98%   Weight:       Height:               Body mass index is 24.3 kg/(m^2).    Intake/Output Summary (Last 24 hours) at 12/09/17 1446  Last data filed at 12/09/17 1209   Gross per 24 hour   Intake              720 ml   Output                0 ml   Net              720 ml       Exam:  GEN:  Awake, alert, has some movement against gravity in the left upper extremity.  Left lower extremity is much stronger.  Has participated with physical therapy today.  Lungs: Clear bilaterally, nonlabored breathing  CV:  Normal S1S2, without murmur, no edema  ABD:  Non tender, no enlarged liver or masses  EXT:  No cyanosis or clubbing      Scheduled meds:      amLODIPine 10 mg Oral Daily   aspirin 81 mg Oral Daily   atorvastatin 80 mg Oral Nightly   folic acid 1 mg Oral Daily   nicotine 1 patch Transdermal Q24H   pantoprazole 40 mg Oral Daily   polyethylene glycol 17 g Oral Daily   QUEtiapine 25 mg Oral Once   rOPINIRole 1 mg Oral BID   thiamine 200 mg Oral Daily   warfarin 5 mg Oral Daily     IV meds:                          heparin (porcine) 12 Units/kg/hr Last Rate: 15 Units/kg/hr (12/09/17 0123)   sodium chloride 75 mL/hr Last Rate: 75 mL/hr (12/05/17 0840)       Data Review:   I reviewed the patient's medications and new clinical results.  Lab Results   Component Value Date    CALCIUM 9.2 12/06/2017    PHOS 3.9 12/06/2017    MG  "2.4 12/06/2017    MG 2.2 12/05/2017    MG 2.4 12/04/2017       Results from last 7 days  Lab Units 12/09/17  0736 12/08/17  1435 12/06/17  0444 12/05/17  2151  12/04/17  0406 12/03/17  0521   SODIUM mmol/L  --   --  142  --   --  138 137   POTASSIUM mmol/L  --   --  3.7 3.3*  --  3.5 3.9   CHLORIDE mmol/L  --   --  103  --   --  103 100   CO2 mmol/L  --   --  22.7  --   --  22.4 23.8   BUN mg/dL  --   --  5*  --   --  6 6   CREATININE mg/dL  --   --  0.57  --   --  0.57 0.62   CALCIUM mg/dL  --   --  9.2  --   --  7.9* 7.9*   BILIRUBIN mg/dL  --   --  0.6  --   --  0.6  --    ALK PHOS U/L  --   --  92  --   --  70  --    ALT (SGPT) U/L  --   --  31  --   --  13  --    AST (SGOT) U/L  --   --  61*  --   --  19  --    GLUCOSE mg/dL  --   --  90  --   --  93 105*   WBC 10*3/mm3 7.03 5.84 5.94  --   < >  --   --    HEMOGLOBIN g/dL 13.0 12.5 13.6  --   < >  --   --    PLATELETS 10*3/mm3 407 365 340  --   < >  --   --    INR  1.04 1.00  --   --   --  1.04  --    PROBNP pg/mL  --   --   --   --   --  313.8  --    PROCALCITONIN ng/mL  --   --  0.06*  --   --   --   --    < > = values in this interval not displayed.    ASSESSMENT:   Acute alcohol withdrawal  CVA (cerebral vascular accident)  Oropharyngeal dysphagia  Left hemiparesis  Hypertension  Hemorrhagic transformation of CVA  Compression of brain      PLAN:  Continue PT    Neuro interventional group states the following: \"Based on imaging visualization of thrombus remaining we recommend unfractionated heparin or Lovenox; preferably heparin till INR 2-3 with bridge to coumadin; ideally starting coumadin once INR is therapeutic. Carotid US showed no significant stenosis in both right and left carotid artery.\"    Heparin, coumadin, check level sin am  Smoking cessation discussion today      Jorden Hu MD  12/9/2017  "

## 2017-12-09 NOTE — PLAN OF CARE
Problem: Fall Risk (Adult)  Goal: Absence of Falls  Outcome: Ongoing (interventions implemented as appropriate)    12/09/17 1807   Fall Risk (Adult)   Absence of Falls making progress toward outcome         Problem: Stroke (Ischemic) (Adult)  Goal: Signs and Symptoms of Listed Potential Problems Will be Absent or Manageable (Stroke)  Outcome: Ongoing (interventions implemented as appropriate)    12/09/17 0616   Stroke (Ischemic)   Problems Assessed (Stroke (Ischemic)/TIA) all   Problems Present (Stroke (Ischemic)/TIA) muscle tone abnormal;eating/swallowing impairment         Problem: Patient Care Overview (Adult)  Goal: Plan of Care Review  Outcome: Ongoing (interventions implemented as appropriate)    12/09/17 1328 12/09/17 1807   Coping/Psychosocial Response Interventions   Plan Of Care Reviewed With patient --    Patient Care Overview   Progress improving --    Outcome Evaluation   Outcome Summary/Follow up Plan --  NIH 3. CIWA 0. vital signs stable. no C/O nausea or vomiting. LOCX4. C/O headache. pTT therapeutic at 66.2 Heparin gtt at 15U/kg/hr.         Problem: Pressure Ulcer Risk (Maury Scale) (Adult,Obstetrics,Pediatric)  Goal: Skin Integrity  Outcome: Ongoing (interventions implemented as appropriate)    12/09/17 1807   Pressure Ulcer Risk (Maury Scale) (Adult,Obstetrics,Pediatric)   Skin Integrity making progress toward outcome

## 2017-12-09 NOTE — PLAN OF CARE
Problem: Patient Care Overview (Adult)  Goal: Plan of Care Review    12/09/17 1328   Coping/Psychosocial Response Interventions   Plan Of Care Reviewed With patient   Patient Care Overview   Progress improving   FUNCTIONAL MOBILITY IS IMPROVING.  VERY MOTIVATED TO ATTAIN GOALS.

## 2017-12-10 LAB
APTT PPP: 174.4 SECONDS (ref 22.7–35.4)
APTT PPP: 60.8 SECONDS (ref 22.7–35.4)
APTT PPP: 86.8 SECONDS (ref 22.7–35.4)
BASOPHILS # BLD AUTO: 0.02 10*3/MM3 (ref 0–0.2)
BASOPHILS NFR BLD AUTO: 0.3 % (ref 0–1.5)
DEPRECATED RDW RBC AUTO: 58.9 FL (ref 37–54)
EOSINOPHIL # BLD AUTO: 0.12 10*3/MM3 (ref 0–0.7)
EOSINOPHIL NFR BLD AUTO: 1.7 % (ref 0.3–6.2)
ERYTHROCYTE [DISTWIDTH] IN BLOOD BY AUTOMATED COUNT: 14.8 % (ref 11.7–13)
HCT VFR BLD AUTO: 36.4 % (ref 35.6–45.5)
HGB BLD-MCNC: 12.3 G/DL (ref 11.9–15.5)
IMM GRANULOCYTES # BLD: 0 10*3/MM3 (ref 0–0.03)
IMM GRANULOCYTES NFR BLD: 0 % (ref 0–0.5)
INR PPP: 1.13 (ref 0.9–1.1)
LYMPHOCYTES # BLD AUTO: 2.35 10*3/MM3 (ref 0.9–4.8)
LYMPHOCYTES NFR BLD AUTO: 32.4 % (ref 19.6–45.3)
MCH RBC QN AUTO: 37.2 PG (ref 26.9–32)
MCHC RBC AUTO-ENTMCNC: 33.8 G/DL (ref 32.4–36.3)
MCV RBC AUTO: 110 FL (ref 80.5–98.2)
MONOCYTES # BLD AUTO: 0.5 10*3/MM3 (ref 0.2–1.2)
MONOCYTES NFR BLD AUTO: 6.9 % (ref 5–12)
NEUTROPHILS # BLD AUTO: 4.27 10*3/MM3 (ref 1.9–8.1)
NEUTROPHILS NFR BLD AUTO: 58.7 % (ref 42.7–76)
PLATELET # BLD AUTO: 411 10*3/MM3 (ref 140–500)
PMV BLD AUTO: 10.6 FL (ref 6–12)
PROTHROMBIN TIME: 14.1 SECONDS (ref 11.7–14.2)
RBC # BLD AUTO: 3.31 10*6/MM3 (ref 3.9–5.2)
WBC NRBC COR # BLD: 7.26 10*3/MM3 (ref 4.5–10.7)

## 2017-12-10 PROCEDURE — 85025 COMPLETE CBC W/AUTO DIFF WBC: CPT | Performed by: INTERNAL MEDICINE

## 2017-12-10 PROCEDURE — 85730 THROMBOPLASTIN TIME PARTIAL: CPT | Performed by: INTERNAL MEDICINE

## 2017-12-10 PROCEDURE — 25010000002 LORAZEPAM PER 2 MG: Performed by: INTERNAL MEDICINE

## 2017-12-10 PROCEDURE — 85610 PROTHROMBIN TIME: CPT | Performed by: INTERNAL MEDICINE

## 2017-12-10 PROCEDURE — 25010000002 HEPARIN (PORCINE) PER 1000 UNITS: Performed by: INTERNAL MEDICINE

## 2017-12-10 RX ADMIN — POLYETHYLENE GLYCOL 3350 17 G: 17 POWDER, FOR SOLUTION ORAL at 09:46

## 2017-12-10 RX ADMIN — ROPINIROLE 1 MG: 1 TABLET, FILM COATED ORAL at 17:44

## 2017-12-10 RX ADMIN — TRAZODONE HYDROCHLORIDE 50 MG: 50 TABLET ORAL at 21:31

## 2017-12-10 RX ADMIN — NICOTINE 1 PATCH: 21 PATCH, EXTENDED RELEASE TRANSDERMAL at 12:20

## 2017-12-10 RX ADMIN — HEPARIN SODIUM 10 UNITS/KG/HR: 10000 INJECTION, SOLUTION INTRAVENOUS at 17:01

## 2017-12-10 RX ADMIN — ATORVASTATIN CALCIUM 80 MG: 80 TABLET, FILM COATED ORAL at 20:20

## 2017-12-10 RX ADMIN — ROPINIROLE 1 MG: 1 TABLET, FILM COATED ORAL at 09:46

## 2017-12-10 RX ADMIN — AMLODIPINE BESYLATE 10 MG: 10 TABLET ORAL at 09:46

## 2017-12-10 RX ADMIN — HEPARIN SODIUM 12 UNITS/KG/HR: 10000 INJECTION, SOLUTION INTRAVENOUS at 11:00

## 2017-12-10 RX ADMIN — BUTALBITAL, ACETAMINOPHEN, AND CAFFEINE 2 TABLET: 50; 325; 40 TABLET ORAL at 01:38

## 2017-12-10 RX ADMIN — FOLIC ACID 1 MG: 1 TABLET ORAL at 09:46

## 2017-12-10 RX ADMIN — PANTOPRAZOLE SODIUM 40 MG: 40 TABLET, DELAYED RELEASE ORAL at 09:46

## 2017-12-10 RX ADMIN — WARFARIN SODIUM 5 MG: 5 TABLET ORAL at 17:45

## 2017-12-10 RX ADMIN — LORAZEPAM 1 MG: 2 INJECTION, SOLUTION INTRAMUSCULAR; INTRAVENOUS at 20:38

## 2017-12-10 RX ADMIN — Medication 1 TABLET: at 16:12

## 2017-12-10 RX ADMIN — Medication 200 MG: at 09:46

## 2017-12-10 RX ADMIN — ROPINIROLE 1 MG: 1 TABLET, FILM COATED ORAL at 21:31

## 2017-12-10 RX ADMIN — Medication 1 TABLET: at 12:19

## 2017-12-10 RX ADMIN — ASPIRIN 81 MG: 81 TABLET, CHEWABLE ORAL at 09:46

## 2017-12-10 NOTE — PLAN OF CARE
Problem: Fall Risk (Adult)  Goal: Absence of Falls  Outcome: Ongoing (interventions implemented as appropriate)    Problem: Stroke (Ischemic) (Adult)  Goal: Signs and Symptoms of Listed Potential Problems Will be Absent or Manageable (Stroke)  Outcome: Ongoing (interventions implemented as appropriate)    Problem: Patient Care Overview (Adult)  Goal: Plan of Care Review  Outcome: Ongoing (interventions implemented as appropriate)    12/10/17 0656   Coping/Psychosocial Response Interventions   Plan Of Care Reviewed With patient   Patient Care Overview   Progress improving   Outcome Evaluation   Outcome Summary/Follow up Plan Pt continues to be A&Ox4. Ambulating with assist x1. PTT redraw ordered, will monitor to adjust heparin. C/O headache, unable to sleep.

## 2017-12-10 NOTE — PROGRESS NOTES
Dr. DEQUAN Hu    [address]    12/10/2017    Patient ID:  Name:  Angelita Brambila  MRN:  [id number]  1965  52 y.o.  female            CC/Reason for visit: Follow-up for stroke    Interval:  No acute events.  Working with pt  Entertaining family in room   Doing puzzle    Vitals:  Vitals:    12/09/17 2128 12/10/17 0514 12/10/17 0830 12/10/17 0946   BP: 130/83 109/87 109/70 101/73   BP Location: Right arm Right arm Right arm    Patient Position: Lying Lying Lying    Pulse: 84 85 84 85   Resp: 20 18     Temp: 97.6 °F (36.4 °C) 97.9 °F (36.6 °C) 98 °F (36.7 °C)    TempSrc: Oral Oral Oral    SpO2: 97% 97% 95%    Weight:       Height:               Body mass index is 24.3 kg/(m^2).  No intake or output data in the 24 hours ending 12/10/17 1409    Exam:  GEN:  Awake, alert, has some movement against gravity in the left upper extremity.  Left lower extremity is much stronger.  Has participated with physical therapy today.  Lungs: Clear bilaterally, nonlabored breathing  CV:  Normal S1S2, without murmur, no edema  ABD:  Non tender, no enlarged liver or masses  EXT:  No cyanosis or clubbing      Scheduled meds:      amLODIPine 10 mg Oral Daily   aspirin 81 mg Oral Daily   atorvastatin 80 mg Oral Nightly   folic acid 1 mg Oral Daily   nicotine 1 patch Transdermal Q24H   pantoprazole 40 mg Oral Daily   polyethylene glycol 17 g Oral Daily   QUEtiapine 25 mg Oral Once   rOPINIRole 1 mg Oral BID   thiamine 200 mg Oral Daily   warfarin 5 mg Oral Daily     IV meds:                          heparin (porcine) 12 Units/kg/hr Last Rate: 12 Units/kg/hr (12/10/17 1100)   sodium chloride 75 mL/hr Last Rate: 75 mL/hr (12/05/17 0840)       Data Review:   I reviewed the patient's medications and new clinical results.  Lab Results   Component Value Date    CALCIUM 9.2 12/06/2017    PHOS 3.9 12/06/2017    MG 2.4 12/06/2017    MG 2.2 12/05/2017    MG 2.4 12/04/2017       Results from last 7 days  Lab Units  "12/10/17  0913 12/10/17  0443 12/09/17  0736 12/08/17  1435 12/06/17  0444 12/05/17  2151 12/04/17  0406   SODIUM mmol/L  --   --   --   --  142  --   --  138   POTASSIUM mmol/L  --   --   --   --  3.7 3.3*  --  3.5   CHLORIDE mmol/L  --   --   --   --  103  --   --  103   CO2 mmol/L  --   --   --   --  22.7  --   --  22.4   BUN mg/dL  --   --   --   --  5*  --   --  6   CREATININE mg/dL  --   --   --   --  0.57  --   --  0.57   CALCIUM mg/dL  --   --   --   --  9.2  --   --  7.9*   BILIRUBIN mg/dL  --   --   --   --  0.6  --   --  0.6   ALK PHOS U/L  --   --   --   --  92  --   --  70   ALT (SGPT) U/L  --   --   --   --  31  --   --  13   AST (SGOT) U/L  --   --   --   --  61*  --   --  19   GLUCOSE mg/dL  --   --   --   --  90  --   --  93   WBC 10*3/mm3  --  7.26 7.03 5.84 5.94  --   < >  --    HEMOGLOBIN g/dL  --  12.3 13.0 12.5 13.6  --   < >  --    PLATELETS 10*3/mm3  --  411 407 365 340  --   < >  --    INR  1.13*  --  1.04 1.00  --   --   --  1.04   PROBNP pg/mL  --   --   --   --   --   --   --  313.8   PROCALCITONIN ng/mL  --   --   --   --  0.06*  --   --   --    < > = values in this interval not displayed.    ASSESSMENT:   Acute alcohol withdrawal  CVA (cerebral vascular accident)  Oropharyngeal dysphagia  Left hemiparesis  Hypertension  Hemorrhagic transformation of CVA  Compression of brain      PLAN:  Continue PT   Continue coumadin    Neuro interventional group states the following: \"Based on imaging visualization of thrombus remaining we recommend unfractionated heparin or Lovenox; preferably heparin till INR 2-3 with bridge to coumadin; ideally starting coumadin once INR is therapeutic. Carotid US showed no significant stenosis in both right and left carotid artery.\"    Heparin, coumadin, check levels in am  Smoking cessation discussion today      Jorden Hu MD  12/10/2017  "

## 2017-12-11 LAB
APTT PPP: 65.2 SECONDS (ref 22.7–35.4)
BASOPHILS # BLD AUTO: 0.02 10*3/MM3 (ref 0–0.2)
BASOPHILS NFR BLD AUTO: 0.2 % (ref 0–1.5)
DEPRECATED RDW RBC AUTO: 58.7 FL (ref 37–54)
EOSINOPHIL # BLD AUTO: 0.09 10*3/MM3 (ref 0–0.7)
EOSINOPHIL NFR BLD AUTO: 1.1 % (ref 0.3–6.2)
ERYTHROCYTE [DISTWIDTH] IN BLOOD BY AUTOMATED COUNT: 14.7 % (ref 11.7–13)
HCT VFR BLD AUTO: 36.7 % (ref 35.6–45.5)
HGB BLD-MCNC: 12 G/DL (ref 11.9–15.5)
IMM GRANULOCYTES # BLD: 0.02 10*3/MM3 (ref 0–0.03)
IMM GRANULOCYTES NFR BLD: 0.2 % (ref 0–0.5)
INR PPP: 1.56 (ref 0.9–1.1)
LYMPHOCYTES # BLD AUTO: 2.44 10*3/MM3 (ref 0.9–4.8)
LYMPHOCYTES NFR BLD AUTO: 30.1 % (ref 19.6–45.3)
MCH RBC QN AUTO: 36.1 PG (ref 26.9–32)
MCHC RBC AUTO-ENTMCNC: 32.7 G/DL (ref 32.4–36.3)
MCV RBC AUTO: 110.5 FL (ref 80.5–98.2)
MONOCYTES # BLD AUTO: 0.65 10*3/MM3 (ref 0.2–1.2)
MONOCYTES NFR BLD AUTO: 8 % (ref 5–12)
NEUTROPHILS # BLD AUTO: 4.88 10*3/MM3 (ref 1.9–8.1)
NEUTROPHILS NFR BLD AUTO: 60.4 % (ref 42.7–76)
PLATELET # BLD AUTO: 416 10*3/MM3 (ref 140–500)
PMV BLD AUTO: 10.9 FL (ref 6–12)
PROTHROMBIN TIME: 18.1 SECONDS (ref 11.7–14.2)
RBC # BLD AUTO: 3.32 10*6/MM3 (ref 3.9–5.2)
WBC NRBC COR # BLD: 8.1 10*3/MM3 (ref 4.5–10.7)

## 2017-12-11 PROCEDURE — 97110 THERAPEUTIC EXERCISES: CPT

## 2017-12-11 PROCEDURE — 25010000002 HEPARIN (PORCINE) PER 1000 UNITS: Performed by: INTERNAL MEDICINE

## 2017-12-11 PROCEDURE — 85610 PROTHROMBIN TIME: CPT | Performed by: INTERNAL MEDICINE

## 2017-12-11 PROCEDURE — 85025 COMPLETE CBC W/AUTO DIFF WBC: CPT | Performed by: INTERNAL MEDICINE

## 2017-12-11 PROCEDURE — 85730 THROMBOPLASTIN TIME PARTIAL: CPT | Performed by: INTERNAL MEDICINE

## 2017-12-11 PROCEDURE — 25010000002 LORAZEPAM PER 2 MG: Performed by: INTERNAL MEDICINE

## 2017-12-11 PROCEDURE — 97535 SELF CARE MNGMENT TRAINING: CPT

## 2017-12-11 RX ADMIN — BUTALBITAL, ACETAMINOPHEN, AND CAFFEINE 2 TABLET: 50; 325; 40 TABLET ORAL at 23:51

## 2017-12-11 RX ADMIN — PANTOPRAZOLE SODIUM 40 MG: 40 TABLET, DELAYED RELEASE ORAL at 10:46

## 2017-12-11 RX ADMIN — ATORVASTATIN CALCIUM 80 MG: 80 TABLET, FILM COATED ORAL at 20:24

## 2017-12-11 RX ADMIN — LORAZEPAM 1 MG: 2 INJECTION, SOLUTION INTRAMUSCULAR; INTRAVENOUS at 00:46

## 2017-12-11 RX ADMIN — Medication 200 MG: at 10:46

## 2017-12-11 RX ADMIN — POLYETHYLENE GLYCOL 3350 17 G: 17 POWDER, FOR SOLUTION ORAL at 10:47

## 2017-12-11 RX ADMIN — LORAZEPAM 1 MG: 2 INJECTION, SOLUTION INTRAMUSCULAR; INTRAVENOUS at 04:11

## 2017-12-11 RX ADMIN — FOLIC ACID 1 MG: 1 TABLET ORAL at 10:45

## 2017-12-11 RX ADMIN — NICOTINE 1 PATCH: 21 PATCH, EXTENDED RELEASE TRANSDERMAL at 12:12

## 2017-12-11 RX ADMIN — SODIUM CHLORIDE 75 ML/HR: 9 INJECTION, SOLUTION INTRAVENOUS at 16:28

## 2017-12-11 RX ADMIN — BUTALBITAL, ACETAMINOPHEN, AND CAFFEINE 2 TABLET: 50; 325; 40 TABLET ORAL at 16:06

## 2017-12-11 RX ADMIN — AMLODIPINE BESYLATE 10 MG: 10 TABLET ORAL at 10:47

## 2017-12-11 RX ADMIN — ASPIRIN 81 MG: 81 TABLET, CHEWABLE ORAL at 10:46

## 2017-12-11 RX ADMIN — ROPINIROLE 1 MG: 1 TABLET, FILM COATED ORAL at 18:25

## 2017-12-11 RX ADMIN — HEPARIN SODIUM 11 UNITS/KG/HR: 10000 INJECTION, SOLUTION INTRAVENOUS at 00:16

## 2017-12-11 RX ADMIN — WARFARIN SODIUM 5 MG: 5 TABLET ORAL at 18:25

## 2017-12-11 RX ADMIN — ROPINIROLE 1 MG: 1 TABLET, FILM COATED ORAL at 10:46

## 2017-12-11 NOTE — CONSULTS
"Adult Nutrition  Assessment/PES    Patient Name:  Angelita Brambila  YOB: 1965  MRN: [id number]  Admit Date:  12/1/2017    Assessment Date:  12/11/2017    Comments:  Nutrition screen completed for LOS.          Reason for Assessment       12/11/17 1024    Reason for Assessment    Reason For Assessment/Visit length of stay    Cardiac HTN    Neurological CVA    Substance Use ETOH;Tobacco                Anthropometrics       12/11/17 1031    Anthropometrics    Height 165.1 cm (65\")    RD Documented Current Weight  66.2 kg (146 lb)    Ideal Body Weight (IBW)    Ideal Body Weight (IBW), Female 57.69    Body Mass Index (BMI)    BMI Grade 19.1 - 24.9 - normal            Labs/Tests/Procedures/Meds       12/11/17 1031    Labs/Tests/Procedures/Meds    Diagnostic Test/Procedure Review reviewed    Labs/Tests Review Reviewed;Alb    Procedure Review SLP    Swallow eval status Done    Medication Review Reviewed, pertinent;Antacid;Anticoag   folic acid, thiamine    Significant Vitals reviewed            Physical Findings       12/11/17 1032    Physical Findings/Assessment    Additional Documentation Physical Appearance (Group)    Physical Appearance    Skin --   intact              Nutrition Prescription Ordered       12/11/17 1034    Nutrition Prescription PO    Current PO Diet Dysphagia    Dysphagia Level 4  Mechanical soft no mixed consistencies    Common Modifiers GI Soft/Anton    Other Modifiers --   nop straws            Evaluation of Received Nutrient/Fluid Intake       12/11/17 1034    PO Evaluation    % PO Intake 50-75%            Problem/Interventions:        Problem 1       12/11/17 1035    Nutrition Diagnoses Problem 1    Problem 1 Nutrition Appropriate for Condition at this Time    Etiology (related to) Medical Diagnosis    Neurological CVA                    Intervention Goal       12/11/17 1036    Intervention Goal    General Maintain nutrition    PO PO intake (%)    PO Intake % 75 %    Weight Maintain " weight            Nutrition Intervention       12/11/17 1036    Nutrition Intervention    RD/Tech Action Follow Tx progress;Care plan reviewd;Interview for preference              Education/Evaluation       12/11/17 1037    Education    Education Will Instruct as appropriate    Monitor/Evaluation    Monitor Per protocol        Electronically signed by:  tSella Toledo RD  12/11/17 10:37 AM

## 2017-12-11 NOTE — SIGNIFICANT NOTE
12/11/17 0959   Rehab Treatment   Discipline physical therapy assistant   Treatment Not Performed other (see comments)  (Pt very lethargic this am. Unable to stay awake to work w/ PT. Notified BEV Macias. Will check back in pm)   Recommendation   PT - Next Appointment 12/11/17

## 2017-12-11 NOTE — PLAN OF CARE
Problem: Inpatient Physical Therapy  Goal: Bed Mobility Goal LTG- PT  Outcome: Ongoing (interventions implemented as appropriate)    12/03/17 1128 12/11/17 1135   Bed Mobility PT LTG   Bed Mobility PT LTG, Time to Achieve --  1 wk   Bed Mobility PT LTG, Activity Type supine to sit/sit to supine --    Bed Mobility PT LTG, [place] Level supervision required --    Bed Mobility PT LTG, Date Goal Reviewed --  12/11/17   Bed Mobility PT LTG, Outcome --  goal ongoing   Bed Mobility PT LTG, Reason Goal Not Met --  progress slower than expected       Goal: Transfer Training Goal 1 LTG- PT  Outcome: Revised    12/11/17 1135   Transfer Training PT LTG   Transfer Training PT LTG, Date Established 12/11/17   Transfer Training PT LTG, Time to Achieve 1 wk   Transfer Training PT LTG, Activity Type all transfers   Transfer Training PT LTG, [place] Level supervision required   Transfer Training PT LTG, Date Goal Reviewed 12/11/17   Transfer Training PT LTG, Outcome goal revised       Goal: Gait Training Goal LTG- PT  Outcome: Revised    12/11/17 1135   Gait Training PT LTG   Gait Training Goal PT LTG, Date Established 12/11/17   Gait Training Goal PT LTG, Time to Achieve 1 wk   Gait Training Goal PT LTG, [place] Level supervision required   Gait Training Goal PT LTG, Distance to Achieve 400

## 2017-12-11 NOTE — PROGRESS NOTES
"      [address]    12/11/2017    Patient ID:  Name:  Angelita Brambila  MRN:  [id number]  1965  52 y.o.  female            CC/Reason for visit: Follow-up for stroke    Interval:  No acute events.  Working with pt  Entertaining family in room   Doing puzzle    Vitals:  Vitals:    12/11/17 0950 12/11/17 1031 12/11/17 1419 12/11/17 1720   BP: 128/78  104/71 108/75   BP Location: Right arm  Right arm Right arm   Patient Position: Lying  Lying Lying   Pulse: 94  87 82   Resp: 18  18 18   Temp: 98.2 °F (36.8 °C)  98.5 °F (36.9 °C) 98.2 °F (36.8 °C)   TempSrc: Oral  Oral Oral   SpO2: 96%  94% 94%   Weight:       Height:  165.1 cm (65\")             Body mass index is 24.3 kg/(m^2).    Intake/Output Summary (Last 24 hours) at 12/11/17 1857  Last data filed at 12/11/17 0016   Gross per 24 hour   Intake              480 ml   Output                0 ml   Net              480 ml       Exam:  GEN:  Awake, alert, has some movement against gravity in the left upper extremity.  Left lower extremity is much stronger.  Has participated with physical therapy today.  Lungs: Clear bilaterally, nonlabored breathing  CV:  Normal S1S2, without murmur, no edema  ABD:  Non tender, no enlarged liver or masses  EXT:  No cyanosis or clubbing      Scheduled meds:      amLODIPine 10 mg Oral Daily   aspirin 81 mg Oral Daily   atorvastatin 80 mg Oral Nightly   folic acid 1 mg Oral Daily   nicotine 1 patch Transdermal Q24H   pantoprazole 40 mg Oral Daily   polyethylene glycol 17 g Oral Daily   QUEtiapine 25 mg Oral Once   rOPINIRole 1 mg Oral BID   thiamine 200 mg Oral Daily   warfarin 5 mg Oral Daily     IV meds:                          heparin (porcine) 12 Units/kg/hr Last Rate: 11 Units/kg/hr (12/11/17 0016)   sodium chloride 75 mL/hr Last Rate: 75 mL/hr (12/11/17 1628)       Data Review:   I reviewed the patient's medications and new clinical results.  Lab Results   Component Value Date    CALCIUM 9.2 12/06/2017    PHOS " "3.9 12/06/2017    MG 2.4 12/06/2017    MG 2.2 12/05/2017    MG 2.4 12/04/2017       Results from last 7 days  Lab Units 12/11/17  0447 12/10/17  0913 12/10/17  0443 12/09/17  0736  12/06/17  0444 12/05/17  2151   SODIUM mmol/L  --   --   --   --   --  142  --    POTASSIUM mmol/L  --   --   --   --   --  3.7 3.3*   CHLORIDE mmol/L  --   --   --   --   --  103  --    CO2 mmol/L  --   --   --   --   --  22.7  --    BUN mg/dL  --   --   --   --   --  5*  --    CREATININE mg/dL  --   --   --   --   --  0.57  --    CALCIUM mg/dL  --   --   --   --   --  9.2  --    BILIRUBIN mg/dL  --   --   --   --   --  0.6  --    ALK PHOS U/L  --   --   --   --   --  92  --    ALT (SGPT) U/L  --   --   --   --   --  31  --    AST (SGOT) U/L  --   --   --   --   --  61*  --    GLUCOSE mg/dL  --   --   --   --   --  90  --    WBC 10*3/mm3 8.10  --  7.26 7.03  < > 5.94  --    HEMOGLOBIN g/dL 12.0  --  12.3 13.0  < > 13.6  --    PLATELETS 10*3/mm3 416  --  411 407  < > 340  --    INR  1.56* 1.13*  --  1.04  < >  --   --    PROCALCITONIN ng/mL  --   --   --   --   --  0.06*  --    < > = values in this interval not displayed.    ASSESSMENT:   Acute alcohol withdrawal  CVA (cerebral vascular accident)  Oropharyngeal dysphagia, on modified diet  Left hemiparesis  Hypertension  Hemorrhagic transformation of CVA  Compression of brain      PLAN:  Continue PT   Continue coumadin, INR is 1.56    Neuro interventional group states the following: \"Based on imaging visualization of thrombus remaining we recommend unfractionated heparin or Lovenox; preferably heparin till INR 2-3 with bridge to coumadin;Carotid US showed no significant stenosis in both right and left carotid artery.\"    Heparin, coumadin, check levels in am  Smoking cessation discussion today      Court Rapp MD  12/11/2017  "

## 2017-12-11 NOTE — PLAN OF CARE
"Problem: Fall Risk (Adult)  Goal: Identify Related Risk Factors and Signs and Symptoms  Outcome: Ongoing (interventions implemented as appropriate)  Goal: Absence of Falls  Outcome: Ongoing (interventions implemented as appropriate)    Problem: Stroke (Ischemic) (Adult)  Goal: Signs and Symptoms of Listed Potential Problems Will be Absent or Manageable (Stroke)  Outcome: Ongoing (interventions implemented as appropriate)    Problem: Patient Care Overview (Adult)  Goal: Plan of Care Review  Outcome: Ongoing (interventions implemented as appropriate)    12/11/17 1553   Coping/Psychosocial Response Interventions   Plan Of Care Reviewed With patient   Patient Care Overview   Progress improving   Outcome Evaluation   Outcome Summary/Follow up Plan Patient alert and oriented x4. Forgettful at times but reorients easily. Up with one assist. Ambulating well. Heparin drip at 11 units. Therapeutic PTT this AM. Refusing TEDS due to restless leg syndrome. No intake of meals- patient stating she \"isn't hungry\" consult placed to dietary.        Goal: Adult Individualization and Mutuality  Outcome: Ongoing (interventions implemented as appropriate)  Goal: Discharge Needs Assessment  Outcome: Ongoing (interventions implemented as appropriate)    Problem: Pressure Ulcer Risk (Maury Scale) (Adult,Obstetrics,Pediatric)  Goal: Skin Integrity  Outcome: Ongoing (interventions implemented as appropriate)    Problem: Nutrition, Imbalanced: Inadequate Oral Intake (Adult)  Goal: Identify Related Risk Factors and Signs and Symptoms  Outcome: Ongoing (interventions implemented as appropriate)  Goal: Improved Oral Intake  Outcome: Ongoing (interventions implemented as appropriate)  Goal: Prevent Further Weight Loss  Outcome: Ongoing (interventions implemented as appropriate)      "

## 2017-12-11 NOTE — PLAN OF CARE
Problem: Patient Care Overview (Adult)  Goal: Plan of Care Review  Outcome: Ongoing (interventions implemented as appropriate)    12/11/17 1346   Coping/Psychosocial Response Interventions   Plan Of Care Reviewed With patient   Outcome Evaluation   Outcome Summary/Follow up Plan Pt tolerated ambulation well. Verbal cueing and assist w/ walker guidance during ambulation due to inattention to L.

## 2017-12-11 NOTE — THERAPY TREATMENT NOTE
Acute Care - Physical Therapy Treatment Note  [facility]     Patient Name: Angelita Brambila  : 1965  MRN: [id number]  Today's Date: 2017  Onset of Illness/Injury or Date of Surgery Date: 17  Date of Referral to PT: 17  Referring Physician: Rajendra    Admit Date: 2017    Visit Dx:    ICD-10-CM ICD-9-CM   1. Cerebrovascular accident (CVA), unspecified mechanism I63.9 434.91   2. Hypokalemia E87.6 276.8   3. Hemiparesis affecting left side as late effect of cerebrovascular accident I69.354 438.20     Patient Active Problem List   Diagnosis   • Complete tear of right rotator cuff   • Precordial pain   • Dyspnea on exertion   • Palpitations   • Essential hypertension   • Hyperlipidemia   • Tobacco abuse   • CVA (cerebral vascular accident)               Adult Rehabilitation Note       17 1300 17 1324       Rehab Assessment/Intervention    Discipline physical therapy assistant  -RW physical therapist  -JR     Document Type therapy note (daily note)  -RW therapy note (daily note)  -JR     Subjective Information agree to therapy;no complaints  -RW no complaints;agree to therapy  -JR     Patient Effort, Rehab Treatment good  -RW good  -JR     Symptoms Noted During/After Treatment  fatigue  -JR     Precautions/Limitations fall precautions  -RW      Recorded by [RW] Laura Arteaga PTA [JR] Mihai Valenzuela, PT     Pain Assessment    Pain Assessment No/denies pain  -RW No/denies pain  -JR     Recorded by [RW] Laura Arteaga PTA [JR] Mihai Valenzuela, PT     Vision Assessment/Intervention    Visual Impairment inattention to the left  -RW      Recorded by [RW] Laura Arteaga PTA      Cognitive Assessment/Intervention    Current Cognitive/Communication Assessment impaired  -RW functional  -JR     Orientation Status oriented to;person;place;time  -RW oriented x 4  -JR     Follows Commands/Answers Questions 100% of the time;needs cueing  -% of the time;needs cueing;needs  increased time  -JR     Personal Safety mild impairment  -RW WNL/WFL  -JR     Personal Safety Interventions fall prevention program maintained;gait belt;nonskid shoes/slippers when out of bed  -RW gait belt;fall prevention program maintained;muscle strengthening facilitated;nonskid shoes/slippers when out of bed   bed alarm was on at beginning of PT  -JR     Recorded by [RW] Laura Arteaga PTA [JR] Mihai Valenzuela, PT     Bed Mobility, Assessment/Treatment    Bed Mobility, Assistive Device head of bed elevated  -RW      Bed Mobility, Scoot/Bridge, Taylor  supervision required;other (see comments)   ABLE TO BRIDGE IN ORDER TO POSITION IN BED.    -JR     Bed Mob, Supine to Sit, [place] supervision required;verbal cues required  -RW contact guard assist  -JR     Bed Mob, Sit to Supine, [place] supervision required;verbal cues required  -RW contact guard assist  -JR     Recorded by [RW] Laura Arteaga PTA [JR] Mihai Valenzuela, PT     Transfer Assessment/Treatment    Transfers, Sit-Stand [place] contact guard assist;verbal cues required  -RW nonverbal cues required (demo/gesture);contact guard assist  -JR     Transfers, Stand-Sit [place] contact guard assist;verbal cues required  -RW contact guard assist;nonverbal cues required (demo/gesture)  -JR     Transfers, Sit-Stand-Sit, Assist Device rolling walker  -RW      Transfer, Impairments strength decreased;impaired balance  -RW impaired balance;strength decreased  -JR     Recorded by [RW] Laura Arteaga PTA [JR] Mihai Valenzuela, PT     Gait Assessment/Treatment    Gait, [place] Level contact guard assist;minimum assist (75% patient effort);verbal cues required  -RW contact guard assist  -JR     Gait, Assistive Device rolling walker  -RW      Gait, Distance (Feet) 200  -  -JR     Gait, Gait Pattern Analysis swing-through gait  -RW      Gait, Gait Deviations forward flexed posture;bilateral:;ataxia;step length decreased;stride  length decreased  -RW ronnell decreased;stride length decreased  -JR     Gait, Safety Issues step length decreased;balance decreased during turns  -RW step length decreased;balance decreased during turns  -JR     Gait, Impairments strength decreased;impaired balance  -RW strength decreased;impaired balance  -JR     Gait, Comment Assist guiding RW. Veers to L and running into obstacles on L side.  -RW      Recorded by [RW] Laura Arteaga PTA [JR] Mihai Valenzuela PT     Stairs Assessment/Treatment    Number of Stairs  12  -     Stairs, Handrail Location  right side (ascending)  -     Stairs, [place] Level  contact guard assist  -     Stairs, Assistive Device  --   NONE  -     Stairs, Technique Used  step over step (ascending);step over step (descending)   AMBULATES SLOWLY AND CAUTIOUSLY.   -JR     Recorded by  [JR] Mihai Valenzuela PT     Balance Skills Training    Standing-Level of Assistance  Contact guard  -     Static Standing Balance Support  No upper extremity supported  -     Standing-Balance Activities  Tandem Stance;Retrieve object from floor;Single Limb Stance   X 5 MIN.   -JR     Recorded by  [JR] Mihai Valenzuela PT     Therapy Exercises    Bilateral Lower Extremities AROM:;10 reps;standing;heel raises;hip abduction/adduction;mini squats  -RW      Recorded by [RW] Laura Arteaga PTA      Positioning and Restraints    Pre-Treatment Position in bed  -RW in bed  -JR     Post Treatment Position bed  -RW bed  -JR     In Bed supine;call light within reach;encouraged to call for assist;exit alarm on;SCD pump applied  -RW notified nsg;supine;call light within reach;encouraged to call for assist;exit alarm on  -JR     Recorded by [RW] Laura Arteaga PTA [JR] Mihai Valenzuela PT       User Key  (r) = Recorded By, (t) = Taken By, (c) = Cosigned By    Initials Name Effective Dates     Mihai Valenzuela PT 01/07/16 -     RW Laura Arteaga PTA 04/06/16 -                 IP PT Goals        12/11/17 1136 12/11/17 1135 12/03/17 1128    Bed Mobility PT LTG    Bed Mobility PT LTG, Date Established   12/03/17  -PC    Bed Mobility PT LTG, Time to Achieve  1 wk  -EE 1 wk  -PC    Bed Mobility PT LTG, Activity Type   supine to sit/sit to supine  -PC    Bed Mobility PT LTG, [place] Level   supervision required  -PC    Bed Mobility PT LTG, Date Goal Reviewed  12/11/17  -EE     Bed Mobility PT LTG, Outcome  goal ongoing  -EE     Bed Mobility PT LTG, Reason Goal Not Met  progress slower than expected  -EE     Transfer Training PT LTG    Transfer Training PT LTG, Date Established  12/11/17  -EE 12/03/17  -PC    Transfer Training PT LTG, Time to Achieve  1 wk  -EE 1 wk  -PC    Transfer Training PT LTG, Activity Type  all transfers  -EE sit to stand/stand to sit  -PC    Transfer Training PT LTG, [place] Level  supervision required  -EE contact guard assist  -PC    Transfer Training PT  LTG, Date Goal Reviewed  12/11/17  -EE     Transfer Training PT LTG, Outcome  goal revised  -EE     Gait Training PT LTG    Gait Training Goal PT LTG, Date Established  12/11/17  -EE 12/03/17  -PC    Gait Training Goal PT LTG, Time to Achieve  1 wk  -EE 1 wk  -PC    Gait Training Goal PT LTG, [place] Level  supervision required  -EE contact guard assist  -PC    Gait Training Goal PT LTG, Distance to Achieve  400  - ft with appropriate assistive device  -PC    Dynamic Standing Balance PT LTG    Dynamic Standing Balance PT LTG, Date Established 12/11/17  -EE      Dynamic Standing Balance PT LTG, Time to Achieve 1 wk  -EE      Dynamic Standing Balance PT LTG, [place] Level supervision required   during dynamic balance activities  -EE        User Key  (r) = Recorded By, (t) = Taken By, (c) = Cosigned By    Initials Name Provider Type    PC Haily Champion, PT Physical Therapist    EE Saritha Way, PT Physical Therapist          Physical Therapy Education     Title: PT OT SLP Therapies (Active)     Topic: Physical  Therapy (Done)     Point: Mobility training (Done)    Learning Progress Summary    Learner Readiness Method Response Comment Documented by Status   Patient Acceptance E,TB,D VU,NR  RW 12/11/17 1347 Done    Eager E VU,DU   12/09/17 1329 Done    Acceptance E,TB,D VU,NR  RW 12/08/17 0853 Done    Acceptance E,TB,D VU,DU,NR  LB 12/07/17 1446 Done    Acceptance E,TB NR  EE 12/06/17 0945 Active    Acceptance E,TB NR  EE 12/05/17 1116 Active    Acceptance E,TB NR  EE 12/04/17 1500 Active    Acceptance E,D NR  PC 12/03/17 1127 Active               Point: Home exercise program (Done)    Learning Progress Summary    Learner Readiness Method Response Comment Documented by Status   Patient Acceptance E,TB,D VU,NR  RW 12/11/17 1347 Done    Eager E VU,DU   12/09/17 1329 Done    Acceptance E,TB,D VU,NR  RW 12/08/17 0853 Done    Acceptance E,TB,D VU,DU,NR  LB 12/07/17 1446 Done    Acceptance E,D NR  PC 12/03/17 1127 Active               Point: Body mechanics (Done)    Learning Progress Summary    Learner Readiness Method Response Comment Documented by Status   Patient Acceptance E,TB,D VU,NR  RW 12/11/17 1347 Done    Eager E VU,DU   12/09/17 1329 Done    Acceptance E,TB,D VU,NR  RW 12/08/17 0853 Done    Acceptance E,TB,D VU,DU,NR  LB 12/07/17 1446 Done    Acceptance E,TB NR  EE 12/06/17 0945 Active    Acceptance E,TB NR  EE 12/05/17 1116 Active    Acceptance E,TB NR  EE 12/04/17 1500 Active    Acceptance E,D NR  PC 12/03/17 1127 Active               Point: Precautions (Done)    Learning Progress Summary    Learner Readiness Method Response Comment Documented by Status   Patient Acceptance E,TB,D VU,NR  RW 12/11/17 1347 Done    Eager E VU,DU   12/09/17 1329 Done    Acceptance E,TB,D VU,NR  RW 12/08/17 0853 Done    Acceptance E,TB,D VU,DU,NR  LB 12/07/17 1446 Done    Acceptance E,TB NR  EE 12/06/17 0945 Active    Acceptance E,TB NR  EE 12/05/17 1116 Active    Acceptance E,TB NR  EE 12/04/17 1500 Active    Acceptance E,D NR  PC  12/03/17 1127 Active                      User Key     Initials Effective Dates Name Provider Type Discipline    PC 12/01/15 -  Haily Champion, PT Physical Therapist PT    EE 12/01/15 -  Saritha Way, PT Physical Therapist PT    JR 01/07/16 -  Mihai Valenzuela, PT Physical Therapist PT    RW 04/06/16 -  Laura Arteaga, SEAN Physical Therapy Assistant PT    LB 11/15/17 -  Krysten Bradley, SEAN PT Student PT                    PT Recommendation and Plan  Anticipated Discharge Disposition: inpatient rehabilitation facility  Planned Therapy Interventions: bed mobility training, balance training, gait training, home exercise program, strengthening, transfer training  PT Frequency: daily  Plan of Care Review  Plan Of Care Reviewed With: patient  Outcome Summary/Follow up Plan: Pt tolerated ambulation well. Verbal cueing and assist w/ walker guidance during ambulation due to inattention to L.           Outcome Measures       12/11/17 1300 12/09/17 1329       How much help from another person do you currently need...    Turning from your back to your side while in flat bed without using bedrails? 3  -RW 3  -JR     Moving from lying on back to sitting on the side of a flat bed without bedrails? 3  -RW 3  -JR     Moving to and from a bed to a chair (including a wheelchair)? 3  -RW 3  -JR     Standing up from a chair using your arms (e.g., wheelchair, bedside chair)? 3  -RW 3  -JR     Climbing 3-5 steps with a railing? 3  -RW 3  -JR     To walk in hospital room? 3  -RW 3  -JR     AM-PAC 6 Clicks Score 18  -RW 18  -JR     Functional Assessment    Outcome Measure Options AM-PAC 6 Clicks Basic Mobility (PT)  -RW        User Key  (r) = Recorded By, (t) = Taken By, (c) = Cosigned By    Initials Name Provider Type    JR Mihai Valenzuela, PT Physical Therapist    RW Laura Arteaga, SEAN Physical Therapy Assistant           Time Calculation:         PT Charges       12/11/17 1346 12/11/17 1136 12/11/17 0959    Time Calculation    Start Time  1305  -RW      Stop Time 1320  -RW      Time Calculation (min) 15 min  -RW      PT Received On 12/11/17  -RW      PT - Next Appointment 12/12/17  -RW  12/11/17  -    PT Goal Re-Cert Due Date  12/18/17  -       User Key  (r) = Recorded By, (t) = Taken By, (c) = Cosigned By    Initials Name Provider Type    MARCIAL Way, PT Physical Therapist    RW Laura Arteaga PTA Physical Therapy Assistant          Therapy Charges for Today     Code Description Service Date Service Provider Modifiers Qty    [id number] HC PT THER PROC EA 15 MIN 12/11/2017 Laura Arteaga PTA GP 1          PT G-Codes  Outcome Measure Options: AM-PAC 6 Clicks Basic Mobility (PT)    Laura Arteaga PTA  12/11/2017

## 2017-12-11 NOTE — PLAN OF CARE
Problem: Fall Risk (Adult)  Goal: Absence of Falls  Outcome: Ongoing (interventions implemented as appropriate)    Problem: Patient Care Overview (Adult)  Goal: Plan of Care Review  Outcome: Ongoing (interventions implemented as appropriate)    12/11/17 0327   Coping/Psychosocial Response Interventions   Plan Of Care Reviewed With patient   Patient Care Overview   Progress improving   Outcome Evaluation   Outcome Summary/Follow up Plan NIH 2, CIWA up to 20, ativan given, patient restless and impulsive, needs constant redirection, will continue to monitor.

## 2017-12-11 NOTE — PLAN OF CARE
Problem: Inpatient Physical Therapy  Goal: Dynamic Standing Balance Goal LTG- PT    12/11/17 1136   Dynamic Standing Balance PT LTG   Dynamic Standing Balance PT LTG, Date Established 12/11/17   Dynamic Standing Balance PT LTG, Time to Achieve 1 wk   Dynamic Standing Balance PT LTG, [place] Level supervision required  (during dynamic balance activities)

## 2017-12-11 NOTE — PLAN OF CARE
Problem: Patient Care Overview (Adult)  Goal: Plan of Care Review  Outcome: Ongoing (interventions implemented as appropriate)    12/11/17 1411   Coping/Psychosocial Response Interventions   Plan Of Care Reviewed With patient   Patient Care Overview   Progress improving   Outcome Evaluation   Outcome Summary/Follow up Plan Pt with Mod difficulty using L UE for bilateral reciprocal coordination activities. Ed pt to attend, use L UE to increase input. Pt is close SBA for ambulation to bathroom.

## 2017-12-11 NOTE — THERAPY TREATMENT NOTE
Acute Care - Occupational Therapy Progress Note  [facility]     Patient Name: Angelita Brambila  : 1965  MRN: [id number]  Today's Date: 2017  Onset of Illness/Injury or Date of Surgery Date: 17     Referring Physician: Rajendra      Admit Date: 2017    Visit Dx:     ICD-10-CM ICD-9-CM   1. Cerebrovascular accident (CVA), unspecified mechanism I63.9 434.91   2. Hypokalemia E87.6 276.8   3. Hemiparesis affecting left side as late effect of cerebrovascular accident I69.354 438.20     Patient Active Problem List   Diagnosis   • Complete tear of right rotator cuff   • Precordial pain   • Dyspnea on exertion   • Palpitations   • Essential hypertension   • Hyperlipidemia   • Tobacco abuse   • CVA (cerebral vascular accident)             Adult Rehabilitation Note       17 1405 17 1300 17 1324    Rehab Assessment/Intervention    Discipline occupational therapist  -LE physical therapy assistant  -RW physical therapist  -JR    Document Type therapy note (daily note)  -LE therapy note (daily note)  -RW therapy note (daily note)  -JR    Subjective Information agree to therapy  -LE agree to therapy;no complaints  -RW no complaints;agree to therapy  -JR    Patient Effort, Rehab Treatment good  -LE good  -RW good  -JR    Symptoms Noted During/After Treatment   fatigue  -JR    Precautions/Limitations fall precautions  -LE fall precautions  -RW     Specific Treatment Considerations --   pt reports R wrist fracture. states has not been splinted  -LE      Recorded by [LE] Jennifer Valentin OTR [RW] Laura Arteaga, PTA [JR] Mihai Valenzuela, PT    Vital Signs    O2 Delivery Pre Treatment room air  -LE      O2 Delivery Intra Treatment room air  -LE      O2 Delivery Post Treatment room air  -LE      Pre Patient Position Supine  -LE      Intra Patient Position Standing  -LE      Post Patient Position Supine  -LE      Recorded by [LE] Jennifer Valentin OTR      Pain Assessment    Pain Assessment 0-10  -LE  No/denies pain  -RW No/denies pain  -JR    Pain Score 5  -LE      Pain Location Hip  -LE      Pain Orientation Right  -LE      Pain Intervention(s) Repositioned;Rest  -LE      Recorded by [LE] BRANT Cee [RW] Laura Arteaga PTA [JR] Mihai Valenzuela, PT    Vision Assessment/Intervention    Visual Impairment  inattention to the left  -RW     Recorded by  [RW] Laura Arteaga PTA     Cognitive Assessment/Intervention    Current Cognitive/Communication Assessment impaired  -LE impaired  -RW functional  -JR    Orientation Status  oriented to;person;place;time  -RW oriented x 4  -JR    Follows Commands/Answers Questions needs cueing;needs increased time;needs repetition  -% of the time;needs cueing  -% of the time;needs cueing;needs increased time  -JR    Personal Safety mild impairment  -LE mild impairment  -RW WNL/WFL  -JR    Personal Safety Interventions fall prevention program maintained;gait belt;nonskid shoes/slippers when out of bed  -LE fall prevention program maintained;gait belt;nonskid shoes/slippers when out of bed  -RW gait belt;fall prevention program maintained;muscle strengthening facilitated;nonskid shoes/slippers when out of bed   bed alarm was on at beginning of PT  -JR    Recorded by [LE] BRANT Cee [RW] Laura Arteaga PTA [JR] Mihai Valenzuela, PT    ROM (Range of Motion)    General ROM --   L UE 7/8  w/ cues to reach end range  -LE      Recorded by [RAYMOND] BRANT Cee      MMT (Manual Muscle Testing)    General MMT Assessment --   LUE 4/5.  decreased control  -LE      Recorded by [LE] BRANT Cee      Bed Mobility, Assessment/Treatment    Bed Mobility, Assistive Device head of bed elevated;bed rails  -LE head of bed elevated  -RW     Bed Mobility, Scoot/Bridge, [place]   supervision required;other (see comments)   ABLE TO BRIDGE IN ORDER TO POSITION IN BED.    -JR    Bed Mob, Supine to Sit, [place] conditional independence  -LE supervision required;verbal  cues required  -RW contact guard assist  -JR    Bed Mob, Sit to Supine, [place] conditional independence  -LE supervision required;verbal cues required  -RW contact guard assist  -JR    Bed Mobility, Impairments --   when first enter pt has legs half off edge of bed  -LE      Recorded by [LE] Jennifer Valentin OTR [RW] Laura Arteaga PTA [JR] Mihai Valenzuela, PT    Transfer Assessment/Treatment    Transfers, Sit-Stand [place] supervision required   close SBA  -LE contact guard assist;verbal cues required  -RW nonverbal cues required (demo/gesture);contact guard assist  -JR    Transfers, Stand-Sit [place] supervision required  -LE contact guard assist;verbal cues required  -RW contact guard assist;nonverbal cues required (demo/gesture)  -JR    Transfers, Sit-Stand-Sit, Assist Device rolling walker  -LE rolling walker  -RW     Toilet Transfer, [place] supervision required  -LE      Toilet Transfer, Assistive Device rolling walker  -LE      Transfer, Impairments  strength decreased;impaired balance  -RW impaired balance;strength decreased  -JR    Recorded by [LE] BRANT Cee [RW] Laura Arteaga, PTA [JR] Mihai Valenzuela, PT    Gait Assessment/Treatment    Gait, [place] Level  contact guard assist;minimum assist (75% patient effort);verbal cues required  -RW contact guard assist  -JR    Gait, Assistive Device  rolling walker  -RW     Gait, Distance (Feet)  200  -  -JR    Gait, Gait Pattern Analysis  swing-through gait  -RW     Gait, Gait Deviations  forward flexed posture;bilateral:;ataxia;step length decreased;stride length decreased  -RW ronnell decreased;stride length decreased  -JR    Gait, Safety Issues  step length decreased;balance decreased during turns  -RW step length decreased;balance decreased during turns  -JR    Gait, Impairments  strength decreased;impaired balance  -RW strength decreased;impaired balance  -JR    Gait, Comment  Assist guiding RW. Veers to L and running  into obstacles on L side.  -RW     Recorded by  [RW] Laura Aretaga, PTA [JR] Mihai Valenzuela, PT    Stairs Assessment/Treatment    Number of Stairs   12  -JR    Stairs, Handrail Location   right side (ascending)  -JR    Stairs, [place] Level   contact guard assist  -JR    Stairs, Assistive Device   --   NONE  -JR    Stairs, Technique Used   step over step (ascending);step over step (descending)   AMBULATES SLOWLY AND CAUTIOUSLY.   -JR    Recorded by   [JR] Mihai Valenzuela PT    Functional Mobility    Functional Mobility- Ind. Level supervision required  -LE      Functional Mobility- Device rolling walker  -LE      Functional Mobility-Distance (Feet) 20  -LE      Functional Mobility- Comment light grasp R hand due to ? wrist fracture  -LE      Recorded by [LE] BRANT Cee      Toileting Assessment/Training    Toileting Assess/Train, Position sitting;supported standing  -LE      Toileting Assess/Train, Indep[place] Level supervision required  -LE      Recorded by [LE] BRANT Cee      Grooming Assessment/Training    Grooming Assess/Train, Position supported standing;sink side  -LE      Grooming Assess/Train, Indepen Level set up required;supervision required;verbal cues required  -LE      Recorded by [LE] BRANT Cee      Balance Skills Training    Standing-Level of Assistance   Contact guard  -    Static Standing Balance Support   No upper extremity supported  -    Standing-Balance Activities   Tandem Stance;Retrieve object from floor;Single Limb Stance   X 5 MIN.   -JR    Recorded by   [JR] Mihai Valenzuela PT    Therapy Exercises    Bilateral Lower Extremities  AROM:;10 reps;standing;heel raises;hip abduction/adduction;mini squats  -RW     Bilateral Upper Extremity AROM:;10 reps;supine;elbow flexion/extension;hand pumps;pronation/supination;shoulder extension/flexion  -LE      Recorded by [LE] BRANT Cee [RW] Laura Arteaga, PTA     Gross Motor Coordination Training    Gross Motor  Skill, Impairments Detail --   mod diff with B reciprocal coordination ex.    -LE      Recorded by [LE] Jennifer Valentin OTCONOR      Positioning and Restraints    Pre-Treatment Position in bed  -LE in bed  -RW in bed  -JR    Post Treatment Position bed  -LE bed  -RW bed  -JR    In Bed notified nsg;supine;call light within reach;encouraged to call for assist;exit alarm on;SCD pump applied;heels elevated  -LE supine;call light within reach;encouraged to call for assist;exit alarm on;SCD pump applied  -RW notified nsg;supine;call light within reach;encouraged to call for assist;exit alarm on  -JR    Recorded by [LE] Jennifer Valentin, OTR [RW] Laura Arteaga, PTA [JR] Mihai Valenzuela, PT      User Key  (r) = Recorded By, (t) = Taken By, (c) = Cosigned By    Initials Name Effective Dates    RAYMOND Valentin OTR 04/13/15 -     JR Mihai Valenzuela, PT 01/07/16 -     RW Laura Arteaga PTA 04/06/16 -                 OT Goals       12/05/17 1223          Transfer Training OT LTG    Transfer Training OT LTG, Date Established 12/05/17  -HC      Transfer Training OT LTG, Time to Achieve 1 wk  -HC      Transfer Training OT LTG, Activity Type bed to chair /chair to bed;toilet;sit to stand/stand to sit  -HC      Transfer Training OT LTG, [place] Level supervision required  -HC      Coordination OT LTG    Coordination OT LTG, Date Established 12/05/17  -HC      Coordination OT LTG, Time to Achieve 1 wk  -HC      Coordination OT LTG, Activity Type FM task;GM task  -HC      Coordination OT LTG, [place] Level min assist;with verbal cues;setup  -HC      ADL OT LTG    ADL OT LTG, Date Established 12/05/17  -HC      ADL OT LTG, Time to Achieve 1 wk  -HC      ADL OT LTG, Activity Type ADL skills  -HC      ADL OT LTG, [place] Level min assist;contact guard  -HC      Endurance OT LTG    Endurance Goal OT LTG, Date Established 12/05/17  -HC      Endurance Goal OT LTG, Time to Achieve 1 wk  -HC      Endurance Goal OT LTG, Activity Level  endurance 2 fair+  -HC        User Key  (r) = Recorded By, (t) = Taken By, (c) = Cosigned By    Initials Name Provider Type     BRANT Hicks Occupational Therapist          Occupational Therapy Education     Title: PT OT SLP Therapies (Active)     Topic: Occupational Therapy (Active)     Point: ADL training (Active)    Description: Instruct learner(s) on proper safety adaptation and remediation techniques during self care or transfers.   Instruct in proper use of assistive devices.    Learning Progress Summary    Learner Readiness Method Response Comment Documented by Status   Patient Eager E NR   12/05/17 1222 Active               Point: Precautions (Active)    Description: Instruct learner(s) on prescribed precautions during self-care and functional transfers.    Learning Progress Summary    Learner Readiness Method Response Comment Documented by Status   Patient Eager E NR   12/05/17 1222 Active               Point: Body mechanics (Active)    Description: Instruct learner(s) on proper positioning and spine alignment during self-care, functional mobility activities and/or exercises.    Learning Progress Summary    Learner Readiness Method Response Comment Documented by Status   Patient Eager E NR   12/05/17 1222 Active                      User Key     Initials Effective Dates Name Provider Type [facility] 08/17/17 -  BRANT Hicks Occupational Therapist OT                  OT Recommendation and Plan  Anticipated Discharge Disposition: inpatient rehabilitation facility  Planned Therapy Interventions: activity intolerance, ADL retraining, IADL retraining, balance training, adaptive equipment training, bed mobility training, transfer training, stretching, strengthening, ROM (Range of Motion)  Therapy Frequency: 3-5 times/wk  Plan of Care Review  Plan Of Care Reviewed With: patient  Progress: improving  Outcome Summary/Follow up Plan: Pt with Mod difficulty using L UE for bilateral reciprocal  coordination activities.  Ed pt to attend, use L UE to increase input.  Pt is close SBA for ambulation to bathroom.         Outcome Measures       12/11/17 1412 12/11/17 1400 12/11/17 1300    How much help from another person do you currently need...    Turning from your back to your side while in flat bed without using bedrails?   3  -RW    Moving from lying on back to sitting on the side of a flat bed without bedrails?   3  -RW    Moving to and from a bed to a chair (including a wheelchair)?   3  -RW    Standing up from a chair using your arms (e.g., wheelchair, bedside chair)?   3  -RW    Climbing 3-5 steps with a railing?   3  -RW    To walk in hospital room?   3  -RW    AM-PAC 6 Clicks Score   18  -RW    How much help from another is currently needed...    Putting on and taking off regular lower body clothing? 3  -LE      Bathing (including washing, rinsing, and drying) 3  -LE      Toileting (which includes using toilet bed pan or urinal) 3  -LE      Putting on and taking off regular upper body clothing 3  -LE      Taking care of personal grooming (such as brushing teeth) 3  -LE      Eating meals 3  -LE      Score 18  -LE      Functional Assessment    Outcome Measure Options  AM-PAC 6 Clicks Daily Activity (OT)  -LE AM-PAC 6 Clicks Basic Mobility (PT)  -RW      12/09/17 1329          How much help from another person do you currently need...    Turning from your back to your side while in flat bed without using bedrails? 3  -JR      Moving from lying on back to sitting on the side of a flat bed without bedrails? 3  -JR      Moving to and from a bed to a chair (including a wheelchair)? 3  -JR      Standing up from a chair using your arms (e.g., wheelchair, bedside chair)? 3  -JR      Climbing 3-5 steps with a railing? 3  -JR      To walk in hospital room? 3  -JR      AM-PAC 6 Clicks Score 18  -JR        User Key  (r) = Recorded By, (t) = Taken By, (c) = Cosigned By    Initials Name Provider Type    RAYMOND Rodriguez  Elder, OTR Occupational Therapist    JR Mihai Valenzuela, PT Physical Therapist    RW Laura Arteaga, PTA Physical Therapy Assistant           Time Calculation:         Time Calculation- OT       12/11/17 1412          Time Calculation- OT    OT Start Time 1339  -LE      OT Stop Time 1403  -LE      OT Time Calculation (min) 24 min  -LE      OT Received On 12/11/17  -LE        User Key  (r) = Recorded By, (t) = Taken By, (c) = Cosigned By    Initials Name Provider Type    BRANT Piper Occupational Therapist           Therapy Charges for Today     Code Description Service Date Service Provider Modifiers Qty    01906309940 HC OT SELF CARE/MGMT/TRAIN EA 15 MIN 12/11/2017 Jennifer Valentin OTR GO 1    66888981979 HC OT THER PROC EA 15 MIN 12/11/2017 BRANT Cee GO 1               BRANT Cee  12/11/2017

## 2017-12-12 ENCOUNTER — TELEPHONE (OUTPATIENT)
Dept: CARDIOLOGY | Facility: CLINIC | Age: 52
End: 2017-12-12

## 2017-12-12 LAB
APTT PPP: 163.5 SECONDS (ref 22.7–35.4)
BASOPHILS # BLD AUTO: 0.02 10*3/MM3 (ref 0–0.2)
BASOPHILS NFR BLD AUTO: 0.3 % (ref 0–1.5)
DEPRECATED RDW RBC AUTO: 57.4 FL (ref 37–54)
EOSINOPHIL # BLD AUTO: 0.09 10*3/MM3 (ref 0–0.7)
EOSINOPHIL NFR BLD AUTO: 1.3 % (ref 0.3–6.2)
ERYTHROCYTE [DISTWIDTH] IN BLOOD BY AUTOMATED COUNT: 14.3 % (ref 11.7–13)
HCT VFR BLD AUTO: 35.8 % (ref 35.6–45.5)
HGB BLD-MCNC: 12 G/DL (ref 11.9–15.5)
IMM GRANULOCYTES # BLD: 0 10*3/MM3 (ref 0–0.03)
IMM GRANULOCYTES NFR BLD: 0 % (ref 0–0.5)
INR PPP: 2.69 (ref 0.9–1.1)
LYMPHOCYTES # BLD AUTO: 2.72 10*3/MM3 (ref 0.9–4.8)
LYMPHOCYTES NFR BLD AUTO: 37.9 % (ref 19.6–45.3)
MCH RBC QN AUTO: 37.2 PG (ref 26.9–32)
MCHC RBC AUTO-ENTMCNC: 33.5 G/DL (ref 32.4–36.3)
MCV RBC AUTO: 110.8 FL (ref 80.5–98.2)
MONOCYTES # BLD AUTO: 0.47 10*3/MM3 (ref 0.2–1.2)
MONOCYTES NFR BLD AUTO: 6.6 % (ref 5–12)
NEUTROPHILS # BLD AUTO: 3.87 10*3/MM3 (ref 1.9–8.1)
NEUTROPHILS NFR BLD AUTO: 53.9 % (ref 42.7–76)
PLATELET # BLD AUTO: 406 10*3/MM3 (ref 140–500)
PMV BLD AUTO: 10.8 FL (ref 6–12)
PROTHROMBIN TIME: 27.7 SECONDS (ref 11.7–14.2)
RBC # BLD AUTO: 3.23 10*6/MM3 (ref 3.9–5.2)
WBC NRBC COR # BLD: 7.17 10*3/MM3 (ref 4.5–10.7)

## 2017-12-12 PROCEDURE — 85730 THROMBOPLASTIN TIME PARTIAL: CPT | Performed by: INTERNAL MEDICINE

## 2017-12-12 PROCEDURE — 85610 PROTHROMBIN TIME: CPT | Performed by: INTERNAL MEDICINE

## 2017-12-12 PROCEDURE — 85025 COMPLETE CBC W/AUTO DIFF WBC: CPT | Performed by: INTERNAL MEDICINE

## 2017-12-12 PROCEDURE — 97535 SELF CARE MNGMENT TRAINING: CPT

## 2017-12-12 PROCEDURE — 97110 THERAPEUTIC EXERCISES: CPT

## 2017-12-12 RX ADMIN — SODIUM CHLORIDE 75 ML/HR: 9 INJECTION, SOLUTION INTRAVENOUS at 05:13

## 2017-12-12 RX ADMIN — ATORVASTATIN CALCIUM 80 MG: 80 TABLET, FILM COATED ORAL at 21:25

## 2017-12-12 RX ADMIN — NICOTINE 1 PATCH: 21 PATCH, EXTENDED RELEASE TRANSDERMAL at 12:45

## 2017-12-12 RX ADMIN — WARFARIN SODIUM 5 MG: 5 TABLET ORAL at 17:28

## 2017-12-12 RX ADMIN — PANTOPRAZOLE SODIUM 40 MG: 40 TABLET, DELAYED RELEASE ORAL at 08:31

## 2017-12-12 RX ADMIN — FOLIC ACID 1 MG: 1 TABLET ORAL at 08:31

## 2017-12-12 RX ADMIN — TRAZODONE HYDROCHLORIDE 50 MG: 50 TABLET ORAL at 00:12

## 2017-12-12 RX ADMIN — ROPINIROLE 1 MG: 1 TABLET, FILM COATED ORAL at 08:32

## 2017-12-12 RX ADMIN — ROPINIROLE 1 MG: 1 TABLET, FILM COATED ORAL at 17:28

## 2017-12-12 RX ADMIN — TRAZODONE HYDROCHLORIDE 50 MG: 50 TABLET ORAL at 21:25

## 2017-12-12 RX ADMIN — AMLODIPINE BESYLATE 10 MG: 10 TABLET ORAL at 08:32

## 2017-12-12 RX ADMIN — ASPIRIN 81 MG: 81 TABLET, CHEWABLE ORAL at 08:31

## 2017-12-12 RX ADMIN — Medication 200 MG: at 08:31

## 2017-12-12 RX ADMIN — POLYETHYLENE GLYCOL 3350 17 G: 17 POWDER, FOR SOLUTION ORAL at 08:31

## 2017-12-12 NOTE — PAYOR COMM NOTE
"UR CONTACT:  ROB                             P: [phone]  F:  [phone]        Padmini Carter (52 y.o. Female)     Date of Birth Social Security Number Address Home Phone MRN    1965  [id number] [facility] [address] [phone] [id number]    Mosque Marital Status          Jehovah's witness        Admission Date Admission Type Admitting Provider Attending Provider Department, Room/Bed    12/1/17 Emergency Aly Drew MD Anaya, Fadi ELDRIDGE MD [address], P578/1    Discharge Date Discharge Disposition Discharge Destination                      Attending Provider: Fadi Mathews MD     Allergies:  Sulfa Antibiotics, Beta Adrenergic Blockers, Codeine, Erythromycin, Penicillins, Tetracyclines & Related, Varenicline    Isolation:  None   Infection:  None   Code Status:  FULL    Ht:  165.1 cm (65\")   Wt:  66.2 kg (146 lb)    Admission Cmt:  None   Principal Problem:  None                Active Insurance as of 12/1/2017     Primary Coverage     Payor Plan Insurance Group Employer/Plan Group    [facility] BLUE CROSS [employer] [facility] [employer] EMPLOYEE [id number]     Payor Plan Address Payor Plan Phone Number Effective From Effective To    [address] [phone] 1/1/2015     [place] [address]       Subscriber Name Subscriber Birth Date Member ID       PADMINI CARTER 1965 [id number]                 Emergency Contacts      (Rel.) Home Phone Work Phone Mobile Phone    Letty Renner (Daughter) -- [phone]    Bekah Rosales (Relative) -- -- [phone]            Lines, Drains & Airways    Active LDAs     Name:   Placement date:   Placement time:   Site:   Days:    Peripheral IV Line - Single Lumen 12/10/17 2010 metacarpal vein (top of hand), right 22 gauge  12/10/17    2010      1    Peripheral IV Line - Single Lumen 12/11/17 1630  22 gauge  12/11/17    1630      less than 1                Hospital Medications (active)       Dose Frequency " "Start End    acetaminophen (TYLENOL) tablet 650 mg 650 mg Every 6 Hours PRN 12/2/2017     Sig - Route: Take 2 tablets by mouth Every 6 (Six) Hours As Needed for Mild Pain . - Oral    amLODIPine (NORVASC) tablet 10 mg 10 mg Daily 12/6/2017     Sig - Route: Take 1 tablet by mouth Daily. - Oral    aspirin chewable tablet 81 mg 81 mg Daily 12/5/2017     Sig - Route: Chew 1 tablet Daily. - Oral    atorvastatin (LIPITOR) tablet 80 mg 80 mg Nightly 12/1/2017     Sig - Route: Take 1 tablet by mouth Every Night. - Oral    butalbital-acetaminophen-caffeine (FIORICET, ESGIC) -40 MG per tablet 2 tablet 2 tablet Every 6 Hours PRN 12/4/2017     Sig - Route: Take 2 tablets by mouth Every 6 (Six) Hours As Needed for Headache. - Oral    calcium carbonate (TUMS) chewable tablet 500 mg (200 mg elemental) 1 tablet 3 Times Daily PRN 12/4/2017     Sig - Route: Chew 500 mg 3 (Three) Times a Day As Needed for Indigestion or Heartburn. - Oral    folic acid (FOLVITE) tablet 1 mg 1 mg Daily 12/5/2017     Sig - Route: Take 1 tablet by mouth Daily. - Oral    HYDROcodone-acetaminophen (NORCO) 5-325 MG per tablet 1 tablet 1 tablet Every 6 Hours PRN 12/2/2017 12/12/2017    Sig - Route: Take 1 tablet by mouth Every 6 (Six) Hours As Needed for Moderate Pain . - Oral    LORazepam (ATIVAN) injection 1 mg 1 mg Every 4 Hours PRN 12/6/2017 12/16/2017    Sig - Route: Infuse 0.5 mL into a venous catheter Every 4 (Four) Hours As Needed for Anxiety, Agitation or Withdrawal. - Intravenous    Magnesium Sulfate 2 gram Bolus, followed by 8 gram infusion (total Mg dose 10 grams)- Mg less than or equal to 1mg/dL 2 g As Needed 12/2/2017     Sig - Route: Infuse 50 mL into a venous catheter As Needed (Mg less than or equal to 1mg/dL). - Intravenous    Linked Group 1:  \"Or\" Linked Group Details        magnesium sulfate 4 gram infusion- Mg 1.6-1.9 mg/dL 4 g As Needed 12/2/2017     Sig - Route: Infuse 100 mL into a venous catheter As Needed (Mg 1.6-1.9 mg/dL). - " "Intravenous    Linked Group 1:  \"Or\" Linked Group Details        Magnesium Sulfate 6 gram Infusion (2 gm x 3) -Mg 1.1 -1.5 mg/dL 2 g As Needed 12/2/2017     Sig - Route: Infuse 50 mL into a venous catheter As Needed (Mg 1.1 -1.5 mg/dL). - Intravenous    Linked Group 1:  \"Or\" Linked Group Details        nicotine (NICODERM CQ) 21 MG/24HR patch 1 patch 1 patch Every 24 Hours 12/5/2017     Sig - Route: Place 1 patch on the skin Daily. - Transdermal    ondansetron (ZOFRAN) injection 4 mg 4 mg Every 6 Hours PRN 12/2/2017     Sig - Route: Infuse 2 mL into a venous catheter Every 6 (Six) Hours As Needed for Nausea or Vomiting. - Intravenous    pantoprazole (PROTONIX) EC tablet 40 mg 40 mg Daily 12/6/2017     Sig - Route: Take 1 tablet by mouth Daily. - Oral    polyethylene glycol (MIRALAX) powder 17 g 17 g Daily 12/7/2017     Sig - Route: Take 17 g by mouth Daily. - Oral    potassium chloride (KLOR-CON) packet 40 mEq 40 mEq As Needed 12/1/2017     Sig - Route: Take 40 mEq by mouth As Needed (potassium replacement, see admin instructions). - Oral    Linked Group 2:  \"Or\" Linked Group Details        potassium chloride (MICRO-K) CR capsule 40 mEq 40 mEq As Needed 12/1/2017     Sig - Route: Take 4 capsules by mouth As Needed (potassium replacement.  see admin instructions). - Oral    Linked Group 2:  \"Or\" Linked Group Details        potassium chloride 10 mEq in 100 mL IVPB 10 mEq Every 1 Hour PRN 12/1/2017     Sig - Route: Infuse 100 mL into a venous catheter Every 1 (One) Hour As Needed (potassium protocol PERIPHERAL - see admin instructions). - Intravenous    Linked Group 2:  \"Or\" Linked Group Details        QUEtiapine (SEROquel) tablet 25 mg 25 mg Once 12/7/2017     Sig - Route: Take 1 tablet by mouth 1 (One) Time. - Oral    rOPINIRole (REQUIP) tablet 1 mg 1 mg 2 Times Daily 12/2/2017     Sig - Route: Take 1 tablet by mouth 2 (Two) Times a Day. - Oral    rOPINIRole (REQUIP) tablet 1 mg 1 mg 2 Times Daily PRN 12/3/2017     " Sig - Route: Take 1 tablet by mouth 2 (Two) Times a Day As Needed (leg cramps). - Oral    sodium chloride 0.9 % flush 1-10 mL 1-10 mL As Needed 12/1/2017     Sig - Route: Infuse 1-10 mL into a venous catheter As Needed for Line Care. - Intravenous    sodium chloride 0.9 % flush 10 mL 10 mL As Needed 12/1/2017     Sig - Route: Infuse 10 mL into a venous catheter As Needed for Line Care. - Intravenous    Cosign for Ordering: Accepted by Colton Leavitt MD on 12/1/2017  9:41 PM    sodium chloride 0.9 % infusion 75 mL/hr Continuous 12/1/2017     Sig - Route: Infuse 75 mL/hr into a venous catheter Continuous. - Intravenous    thiamine (VITAMIN B-1) tablet 200 mg 200 mg Daily 12/5/2017     Sig - Route: Take 2 tablets by mouth Daily. - Oral    traZODone (DESYREL) tablet 50 mg 50 mg Nightly PRN 12/6/2017     Sig - Route: Take 1 tablet by mouth At Night As Needed for Sleep. - Oral    warfarin (COUMADIN) tablet 5 mg 5 mg Daily Warfarin 12/9/2017     Sig - Route: Take 1 tablet by mouth Daily. - Oral    heparin 54629 units/250 mL (100 units/mL) in 0.45 % NaCl infusion (Discontinued) 12 Units/kg/hr × 66.2 kg Titrated 12/8/2017 12/12/2017    Sig - Route: Infuse 794 Units/hr into a venous catheter Dose Adjusted By Provider As Needed. - Intravenous             Physician Progress Notes       Court Rapp MD at 12/11/2017  6:57 PM  Version 1 of 1               [address]    12/11/2017    Patient ID:  Name:  Angelita Brambila  MRN:  [id number]  1965  52 y.o.  female            CC/Reason for visit: Follow-up for stroke    Interval:  No acute events.  Working with pt  Entertaining family in room   Doing puzzle    Vitals:  Vitals:    12/11/17 0950 12/11/17 1031 12/11/17 1419 12/11/17 1720   BP: 128/78  104/71 108/75   BP Location: Right arm  Right arm Right arm   Patient Position: Lying  Lying Lying   Pulse: 94  87 82   Resp: 18  18 18   Temp: 98.2 °F (36.8 °C)  98.5 °F (36.9 °C) 98.2 °F (36.8 °C)  "  TempSrc: Oral  Oral Oral   SpO2: 96%  94% 94%   Weight:       Height:  165.1 cm (65\")             Body mass index is 24.3 kg/(m^2).    Intake/Output Summary (Last 24 hours) at 12/11/17 1857  Last data filed at 12/11/17 0016   Gross per 24 hour   Intake              480 ml   Output                0 ml   Net              480 ml       Exam:  GEN:  Awake, alert, has some movement against gravity in the left upper extremity.  Left lower extremity is much stronger.  Has participated with physical therapy today.  Lungs: Clear bilaterally, nonlabored breathing  CV:  Normal S1S2, without murmur, no edema  ABD:  Non tender, no enlarged liver or masses  EXT:  No cyanosis or clubbing      Scheduled meds:      amLODIPine 10 mg Oral Daily   aspirin 81 mg Oral Daily   atorvastatin 80 mg Oral Nightly   folic acid 1 mg Oral Daily   nicotine 1 patch Transdermal Q24H   pantoprazole 40 mg Oral Daily   polyethylene glycol 17 g Oral Daily   QUEtiapine 25 mg Oral Once   rOPINIRole 1 mg Oral BID   thiamine 200 mg Oral Daily   warfarin 5 mg Oral Daily     IV meds:                          heparin (porcine) 12 Units/kg/hr Last Rate: 11 Units/kg/hr (12/11/17 0016)   sodium chloride 75 mL/hr Last Rate: 75 mL/hr (12/11/17 1628)       Data Review:   I reviewed the patient's medications and new clinical results.  Lab Results   Component Value Date    CALCIUM 9.2 12/06/2017    PHOS 3.9 12/06/2017    MG 2.4 12/06/2017    MG 2.2 12/05/2017    MG 2.4 12/04/2017       Results from last 7 days  Lab Units 12/11/17  0447 12/10/17  0913 12/10/17  0443 12/09/17  0736  12/06/17  0444 12/05/17  2151   SODIUM mmol/L  --   --   --   --   --  142  --    POTASSIUM mmol/L  --   --   --   --   --  3.7 3.3*   CHLORIDE mmol/L  --   --   --   --   --  103  --    CO2 mmol/L  --   --   --   --   --  22.7  --    BUN mg/dL  --   --   --   --   --  5*  --    CREATININE mg/dL  --   --   --   --   --  0.57  --    CALCIUM mg/dL  --   --   --   --   --  9.2  --    BILIRUBIN " "mg/dL  --   --   --   --   --  0.6  --    ALK PHOS U/L  --   --   --   --   --  92  --    ALT (SGPT) U/L  --   --   --   --   --  31  --    AST (SGOT) U/L  --   --   --   --   --  61*  --    GLUCOSE mg/dL  --   --   --   --   --  90  --    WBC 10*3/mm3 8.10  --  7.26 7.03  < > 5.94  --    HEMOGLOBIN g/dL 12.0  --  12.3 13.0  < > 13.6  --    PLATELETS 10*3/mm3 416  --  411 407  < > 340  --    INR  1.56* 1.13*  --  1.04  < >  --   --    PROCALCITONIN ng/mL  --   --   --   --   --  0.06*  --    < > = values in this interval not displayed.    ASSESSMENT:   Acute alcohol withdrawal  CVA (cerebral vascular accident)  Oropharyngeal dysphagia, on modified diet  Left hemiparesis  Hypertension  Hemorrhagic transformation of CVA  Compression of brain      PLAN:  Continue PT   Continue coumadin, INR is 1.56    Neuro interventional group states the following: \"Based on imaging visualization of thrombus remaining we recommend unfractionated heparin or Lovenox; preferably heparin till INR 2-3 with bridge to coumadin;Carotid US showed no significant stenosis in both right and left carotid artery.\"    Heparin, coumadin, check levels in am  Smoking cessation discussion today      Court Rapp MD  12/11/2017     Electronically signed by Court Rapp MD at 12/11/2017  7:04 PM           Nutrition Notes       Stella Toledo RD at 12/11/2017 10:37 AM  Version 1 of 1         Adult Nutrition  Assessment/PES    Patient Name:  Angelita Brambila  YOB: 1965  MRN: [id number]  Admit Date:  12/1/2017    Assessment Date:  12/11/2017    Comments:  Nutrition screen completed for LOS.          Reason for Assessment       12/11/17 1024    Reason for Assessment    Reason For Assessment/Visit length of stay    Cardiac HTN    Neurological CVA    Substance Use ETOH;Tobacco                Anthropometrics       12/11/17 1031    Anthropometrics    Height 165.1 cm (65\")    RD Documented Current Weight  66.2 kg (146 lb)    Ideal " Body Weight (IBW)    Ideal Body Weight (IBW), Female 57.69    Body Mass Index (BMI)    BMI Grade 19.1 - 24.9 - normal            Labs/Tests/Procedures/Meds       12/11/17 1031    Labs/Tests/Procedures/Meds    Diagnostic Test/Procedure Review reviewed    Labs/Tests Review Reviewed;Alb    Procedure Review SLP    Swallow eval status Done    Medication Review Reviewed, pertinent;Antacid;Anticoag   folic acid, thiamine    Significant Vitals reviewed            Physical Findings       12/11/17 1032    Physical Findings/Assessment    Additional Documentation Physical Appearance (Group)    Physical Appearance    Skin --   intact              Nutrition Prescription Ordered       12/11/17 1034    Nutrition Prescription PO    Current PO Diet Dysphagia    Dysphagia Level 4  Mechanical soft no mixed consistencies    Common Modifiers GI Soft/Sutton    Other Modifiers --   nop straws            Evaluation of Received Nutrient/Fluid Intake       12/11/17 1034    PO Evaluation    % PO Intake 50-75%            Problem/Interventions:        Problem 1       12/11/17 1035    Nutrition Diagnoses Problem 1    Problem 1 Nutrition Appropriate for Condition at this Time    Etiology (related to) Medical Diagnosis    Neurological CVA                    Intervention Goal       12/11/17 1036    Intervention Goal    General Maintain nutrition    PO PO intake (%)    PO Intake % 75 %    Weight Maintain weight            Nutrition Intervention       12/11/17 1036    Nutrition Intervention    RD/Tech Action Follow Tx progress;Care plan reviewd;Interview for preference              Education/Evaluation       12/11/17 1037    Education    Education Will Instruct as appropriate    Monitor/Evaluation    Monitor Per protocol        Electronically signed by:  Stella Toledo RD  12/11/17 10:37 AM     Electronically signed by Stella Toledo RD at 12/11/2017 10:38 AM

## 2017-12-12 NOTE — THERAPY TREATMENT NOTE
Acute Care - Occupational Therapy Treatment Note  [facility]     Patient Name: Angelita Brambila  : 1965  MRN: [id number]  Today's Date: 2017  Onset of Illness/Injury or Date of Surgery Date: 17     Referring Physician: Rajendra      Admit Date: 2017    Visit Dx:     ICD-10-CM ICD-9-CM   1. Cerebrovascular accident (CVA), unspecified mechanism I63.9 434.91   2. Hypokalemia E87.6 276.8   3. Hemiparesis affecting left side as late effect of cerebrovascular accident I69.354 438.20     Patient Active Problem List   Diagnosis   • Complete tear of right rotator cuff   • Precordial pain   • Dyspnea on exertion   • Palpitations   • Essential hypertension   • Hyperlipidemia   • Tobacco abuse   • CVA (cerebral vascular accident)             Adult Rehabilitation Note       17 1300 17 0834 17 1405    Rehab Assessment/Intervention    Discipline occupational therapist  -HC physical therapy assistant  -RW occupational therapist  -LE    Document Type therapy note (daily note)  -HC therapy note (daily note)  -RW therapy note (daily note)  -LE    Subjective Information agree to therapy;no complaints  -HC agree to therapy;no complaints  -RW agree to therapy  -LE    Patient Effort, Rehab Treatment good  -HC good  -RW good  -LE    Symptoms Noted During/After Treatment none  -HC      Precautions/Limitations fall precautions  -HC fall precautions  -RW fall precautions  -LE    Specific Treatment Considerations   --   pt reports R wrist fracture. states has not been splinted  -LE    Recorded by [HC] BRANT Hicks [RW] Laura Arteaga, SEAN [LE] BRANT Cee    Vital Signs    O2 Delivery Pre Treatment   room air  -LE    O2 Delivery Intra Treatment   room air  -LE    O2 Delivery Post Treatment   room air  -LE    Pre Patient Position   Supine  -LE    Intra Patient Position   Standing  -LE    Post Patient Position   Supine  -LE    Recorded by   [LE] BRANT Cee    Pain Assessment    Pain  Assessment No/denies pain  -HC No/denies pain  -RW 0-10  -LE    Pain Score   5  -LE    Pain Location   Hip  -LE    Pain Orientation   Right  -LE    Pain Intervention(s)   Repositioned;Rest  -LE    Recorded by [HC] BRANT Hicks [RW] Laura Arteaga PTA [LE] BRANT Cee    Vision Assessment/Intervention    Visual Impairment inattention to the left;left neglect  -HC inattention to the left;left neglect  -RW     Recorded by [HC] BRANT Hicks [RW] Laura Arteaga PTA     Cognitive Assessment/Intervention    Current Cognitive/Communication Assessment impaired  -HC impaired  -RW impaired  -LE    Orientation Status oriented x 4  -HC oriented x 4  -RW     Follows Commands/Answers Questions 100% of the time;needs cueing  -% of the time;needs cueing;needs increased time  -RW needs cueing;needs increased time;needs repetition  -LE    Personal Safety mild impairment  -HC mild impairment;at risk behaviors demonstrated;impulsive  -RW mild impairment  -LE    Personal Safety Interventions fall prevention program maintained;gait belt;nonskid shoes/slippers when out of bed  -HC fall prevention program maintained;gait belt;nonskid shoes/slippers when out of bed  -RW fall prevention program maintained;gait belt;nonskid shoes/slippers when out of bed  -LE    Recorded by [HC] BRANT Hicks [RW] Laura Arteaga PTA [LE] BRANT Cee    ROM (Range of Motion)    General ROM   --   L UE 7/8  w/ cues to reach end range  -LE    Recorded by   [LE] BRANT Cee    MMT (Manual Muscle Testing)    General MMT Assessment   --   LUE 4/5.  decreased control  -LE    Recorded by   [LE] BRANT Cee    Mobility Assessment/Training    Additional Documentation  Wheelchair Training/Management (Group)  -RW     Recorded by  [RW] Laura Arteaga PTA     Bed Mobility, Assessment/Treatment    Bed Mobility, Assistive Device  bed rails;head of bed elevated  -RW head of bed elevated;bed rails  -LE    Bed Mob, Supine to  Sit, [place]  conditional independence  -RW conditional independence  -LE    Bed Mob, Sit to Supine, [place]  conditional independence  -RW conditional independence  -LE    Bed Mobility, Impairments   --   when first enter pt has legs half off edge of bed  -LE    Bed Mobility, Comment up in chair  -HC      Recorded by [HC] Vida Mcgarry, OTR [RW] Laura Arteaga, SEAN [LE] Jennifer Valentin OTR    Transfer Assessment/Treatment    Transfers, Bed-Chair [place]  stand by assist;verbal cues required   bed to w/c  -RW     Transfers, Chair-Bed [place]  not tested  -RW     Transfers, Sit-Stand [place] stand by assist  -HC stand by assist  -RW supervision required   close SBA  -LE    Transfers, Stand-Sit [place] stand by assist  -HC stand by assist  -RW supervision required  -LE    Transfers, Sit-Stand-Sit, Assist Device   rolling walker  -LE    Toilet Transfer, [place]   supervision required  -LE    Toilet Transfer, Assistive Device   rolling walker  -LE    Recorded by [HC] Vida Mcgarry, JENAROR [RW] Laura Arteaga, SEAN [LE] Jennifer Valentin, JENAROR    Gait Assessment/Treatment    Gait, [place] Level  stand by assist;verbal cues required  -RW     Gait, Assistive Device  rolling walker  -RW     Gait, Distance (Feet)  400  -RW     Gait, Gait Pattern Analysis  swing-through gait  -RW     Gait, Gait Deviations  forward flexed posture;bilateral:;ronnell decreased  -RW     Gait, Impairments  strength decreased;impaired balance  -RW     Gait, Comment  Pt given cueing and tasks to look left during ambulation and tolerated well  -RW     Recorded by  [RW] Laura Arteaga PTA     Functional Mobility    Functional Mobility- Ind. Level supervision required  -HC  supervision required  -LE    Functional Mobility- Device   rolling walker  -LE    Functional Mobility-Distance (Feet) --   to BR from chair  -HC  20  -LE    Functional Mobility- Comment   light grasp R hand due to ? wrist fracture  -LE    Recorded by  "[HC] BRANT Hicks  [LE] BRANT Cee    Wheelchair Training/Management    Wheelchair Transfer Type  stand pivot transfer  -RW     Wheelchair Task Training  armrest management;footrest management;leg rest management;wheel lock management;safety considerations  -RW     Wheelchair Task Training Comment  Educated pt and pt verbally understood  -RW     Wheelchair Propulsion Training  forward propulsion;backward propulsion;moving through doorways;steering wheelchair;turning wheelchair  -RW     Wheelchair Propulsion Training Comment  Debo required due to L sided weakness/neglect.   -RW     Wheelchair, Distance Propelled  80  -RW     Recorded by  [RW] Laura Arteaga PTA     Toileting Assessment/Training    Toileting Assess/Train, Position   sitting;supported standing  -LE    Toileting Assess/Train, Indepen Level   supervision required  -LE    Recorded by   [LE] BRANT Cee    Grooming Assessment/Training    Grooming Assess/Train, Position supported standing;sink side  -HC  supported standing;sink side  -LE    Grooming Assess/Train, Indepen Level supervision required;set up required;verbal cues required  -HC  set up required;supervision required;verbal cues required  -LE    Grooming Assess/Train, Comment Pt able to complete oral hygiene with items placed L and cues to scan Lto find requiring increased time but no assist. With increased time pt able to incorporate LUE during all tasks stating \"I'm trying to pay more attention to my left side.\"  -HC      Recorded by [HC] BRANT Hicks  [LE] BRANT Cee    Balance Skills Training    Standing-Level of Assistance Close supervision  -HC      Static Standing Balance Support No upper extremity supported  -HC      Recorded by [HC] BRANT Hicks      Therapy Exercises    Bilateral Upper Extremity   AROM:;10 reps;supine;elbow flexion/extension;hand pumps;pronation/supination;shoulder extension/flexion  -LE    Recorded by   [LE] BRANT Cee    Gross " Motor Coordination Training    Gross Motor Skill, Impairments Detail   --   mod diff with B reciprocal coordination ex.    -LE    Recorded by   [LE] BRANT Cee    Neuromuscular Re-education    Neuromuscular Re-Ed Techniques Fine motor task- grasp/release;Gross motor task- grasp/release;Attention to left side during functional activities  -HC      Recorded by [HC] BRANT Hicks      Positioning and Restraints    Pre-Treatment Position sitting in chair/recliner  -HC in bed  -RW in bed  -LE    Post Treatment Position chair  -HC bed  -RW bed  -LE    In Bed sitting;call light within reach;encouraged to call for assist;exit alarm on  -HC fowlers;call light within reach;encouraged to call for assist;exit alarm on;SCD pump applied;notified nsg  -RW notified nsg;supine;call light within reach;encouraged to call for assist;exit alarm on;SCD pump applied;heels elevated  -LE    Recorded by [HC] BRANT Hicks [RW] Laura Arteaga PTA [LE] BRANT Cee      12/11/17 1300          Rehab Assessment/Intervention    Discipline physical therapy assistant  -RW      Document Type therapy note (daily note)  -RW      Subjective Information agree to therapy;no complaints  -RW      Patient Effort, Rehab Treatment good  -RW      Precautions/Limitations fall precautions  -RW      Recorded by [RW] Laura Arteaga PTA      Pain Assessment    Pain Assessment No/denies pain  -RW      Recorded by [RW] Laura Arteaga PTA      Vision Assessment/Intervention    Visual Impairment inattention to the left  -RW      Recorded by [RW] Laura Arteaga PTA      Cognitive Assessment/Intervention    Current Cognitive/Communication Assessment impaired  -RW      Orientation Status oriented to;person;place;time  -RW      Follows Commands/Answers Questions 100% of the time;needs cueing  -RW      Personal Safety mild impairment  -RW      Personal Safety Interventions fall prevention program maintained;gait belt;nonskid shoes/slippers when  out of bed  -RW      Recorded by [RW] Laura Arteaga PTA      Bed Mobility, Assessment/Treatment    Bed Mobility, Assistive Device head of bed elevated  -RW      Bed Mob, Supine to Sit, [place] supervision required;verbal cues required  -RW      Bed Mob, Sit to Supine, [place] supervision required;verbal cues required  -RW      Recorded by [RW] Laura Arteaga PTA      Transfer Assessment/Treatment    Transfers, Sit-Stand [place] contact guard assist;verbal cues required  -RW      Transfers, Stand-Sit [place] contact guard assist;verbal cues required  -RW      Transfers, Sit-Stand-Sit, Assist Device rolling walker  -RW      Transfer, Impairments strength decreased;impaired balance  -RW      Recorded by [RW] Laura Arteaga PTA      Gait Assessment/Treatment    Gait, [place] Level contact guard assist;minimum assist (75% patient effort);verbal cues required  -RW      Gait, Assistive Device rolling walker  -RW      Gait, Distance (Feet) 200  -RW      Gait, Gait Pattern Analysis swing-through gait  -RW      Gait, Gait Deviations forward flexed posture;bilateral:;ataxia;step length decreased;stride length decreased  -RW      Gait, Safety Issues step length decreased;balance decreased during turns  -RW      Gait, Impairments strength decreased;impaired balance  -RW      Gait, Comment Assist guiding RW. Veers to L and running into obstacles on L side.  -RW      Recorded by [RW] Laura Arteaga PTA      Therapy Exercises    Bilateral Lower Extremities AROM:;10 reps;standing;heel raises;hip abduction/adduction;mini squats  -RW      Recorded by [RW] Laura Arteaga PTA      Positioning and Restraints    Pre-Treatment Position in bed  -RW      Post Treatment Position bed  -RW      In Bed supine;call light within reach;encouraged to call for assist;exit alarm on;SCD pump applied  -RW      Recorded by [RW] Laura Arteaga PTA        User Key  (r) = Recorded By, (t) = Taken By, (c) = Cosigned By     Initials Name Effective Dates    RAYMOND Valentin, OTR 04/13/15 -     RW Laura Arteaga, PTA 04/06/16 -     HC Vida Mcgarry, OTR 08/17/17 -                 OT Goals       12/05/17 1223          Transfer Training OT LTG    Transfer Training OT LTG, Date Established 12/05/17  -HC      Transfer Training OT LTG, Time to Achieve 1 wk  -HC      Transfer Training OT LTG, Activity Type bed to chair /chair to bed;toilet;sit to stand/stand to sit  -HC      Transfer Training OT LTG, [place] Level supervision required  -HC      Coordination OT LTG    Coordination OT LTG, Date Established 12/05/17  -HC      Coordination OT LTG, Time to Achieve 1 wk  -HC      Coordination OT LTG, Activity Type FM task;GM task  -HC      Coordination OT LTG, [place] Level min assist;with verbal cues;setup  -HC      ADL OT LTG    ADL OT LTG, Date Established 12/05/17  -HC      ADL OT LTG, Time to Achieve 1 wk  -HC      ADL OT LTG, Activity Type ADL skills  -HC      ADL OT LTG, [place] Level min assist;contact guard  -HC      Endurance OT LTG    Endurance Goal OT LTG, Date Established 12/05/17  -HC      Endurance Goal OT LTG, Time to Achieve 1 wk  -HC      Endurance Goal OT LTG, Activity Level endurance 2 fair+  -HC        User Key  (r) = Recorded By, (t) = Taken By, (c) = Cosigned By    Initials Name Provider Type     BRANT Hicks Occupational Therapist          Occupational Therapy Education     Title: PT OT SLP Therapies (Active)     Topic: Occupational Therapy (Active)     Point: ADL training (Active)    Description: Instruct learner(s) on proper safety adaptation and remediation techniques during self care or transfers.   Instruct in proper use of assistive devices.    Learning Progress Summary    Learner Readiness Method Response Comment Documented by Status   Patient Eager E NR   12/05/17 1222 Active               Point: Precautions (Active)    Description: Instruct learner(s) on prescribed precautions during  self-care and functional transfers.    Learning Progress Summary    Learner Readiness Method Response Comment Documented by Status   Patient Kirby SHAH NR   12/05/17 1222 Active               Point: Body mechanics (Active)    Description: Instruct learner(s) on proper positioning and spine alignment during self-care, functional mobility activities and/or exercises.    Learning Progress Summary    Learner Readiness Method Response Comment Documented by Status   Patient Kirby SHAH NR   12/05/17 1222 Active                      User Key     Initials Effective Dates Name Provider Type Discipline     08/17/17 -  BRANT Hicks Occupational Therapist OT                  OT Recommendation and Plan  Anticipated Discharge Disposition: inpatient rehabilitation facility  Planned Therapy Interventions: activity intolerance, ADL retraining, IADL retraining, balance training, adaptive equipment training, bed mobility training, transfer training, stretching, strengthening, ROM (Range of Motion)  Therapy Frequency: 3-5 times/wk  Plan of Care Review  Plan Of Care Reviewed With: patient  Outcome Summary/Follow up Plan: Pt improving standing balance and LUE motor coordination during functional tasks. May benefit from [facility] and [facility] HEP prior to d/c. Will continue to follow.        Outcome Measures       12/12/17 1400 12/12/17 0800 12/11/17 1412    How much help from another person do you currently need...    Turning from your back to your side while in flat bed without using bedrails?  4  -RW     Moving from lying on back to sitting on the side of a flat bed without bedrails?  3  -RW     Moving to and from a bed to a chair (including a wheelchair)?  3  -RW     Standing up from a chair using your arms (e.g., wheelchair, bedside chair)?  3  -RW     Climbing 3-5 steps with a railing?  3  -RW     To walk in hospital room?  3  -RW     AM-PAC 6 Clicks Score  19  -RW     How much help from another is currently needed...    Putting on and  taking off regular lower body clothing? 3  -HC  3  -LE    Bathing (including washing, rinsing, and drying) 3  -HC  3  -LE    Toileting (which includes using toilet bed pan or urinal) 3  -HC  3  -LE    Putting on and taking off regular upper body clothing 3  -HC  3  -LE    Taking care of personal grooming (such as brushing teeth) 3  -HC  3  -LE    Eating meals 3  -HC  3  -LE    Score 18  -HC  18  -LE    Functional Assessment    Outcome Measure Options AM-PAC 6 Clicks Daily Activity (OT)  -HC AM-PAC 6 Clicks Basic Mobility (PT)  -RW       12/11/17 1400 12/11/17 1300       How much help from another person do you currently need...    Turning from your back to your side while in flat bed without using bedrails?  3  -RW     Moving from lying on back to sitting on the side of a flat bed without bedrails?  3  -RW     Moving to and from a bed to a chair (including a wheelchair)?  3  -RW     Standing up from a chair using your arms (e.g., wheelchair, bedside chair)?  3  -RW     Climbing 3-5 steps with a railing?  3  -RW     To walk in hospital room?  3  -RW     AM-PAC 6 Clicks Score  18  -RW     Functional Assessment    Outcome Measure Options AM-PAC 6 Clicks Daily Activity (OT)  -LE AM-PAC 6 Clicks Basic Mobility (PT)  -RW       User Key  (r) = Recorded By, (t) = Taken By, (c) = Cosigned By    Initials Name Provider Type    RAYMOND Valentin, OTR Occupational Therapist    RW Laura Arteaga, PTA Physical Therapy Assistant     BRANT Hicks Occupational Therapist           Time Calculation:         Time Calculation- OT       12/12/17 1403          Time Calculation- OT    OT Start Time 1335  -HC      OT Stop Time 1350  -HC      OT Time Calculation (min) 15 min  -      OT Received On 12/12/17  -      OT - Next Appointment 12/13/17  -        User Key  (r) = Recorded By, (t) = Taken By, (c) = Cosigned By    Initials Name Provider Type    BRANT Gamez Occupational Therapist           Therapy Charges for Today      Code Description Service Date Service Provider Modifiers Qty    55630597286 HC OT SELF CARE/MGMT/TRAIN EA 15 MIN 12/12/2017 BRANT Hicks GO 1               BRANT Hicks  12/12/2017

## 2017-12-12 NOTE — PLAN OF CARE
Problem: Patient Care Overview (Adult)  Goal: Plan of Care Review  Outcome: Ongoing (interventions implemented as appropriate)    12/12/17 1400   Coping/Psychosocial Response Interventions   Plan Of Care Reviewed With patient   Outcome Evaluation   Outcome Summary/Follow up Plan Pt improving standing balance and LUE motor coordination during functional tasks. May benefit from [facility] and [facility] HEP prior to d/c. Will continue to follow.

## 2017-12-12 NOTE — CONSULTS
Adult Nutrition  Assessment/PES    Patient Name:  Angelita Brambila  YOB: 1965  MRN: [id number]  Admit Date:  12/1/2017    Assessment Date:  12/12/2017    Comments:  RN trigger. Will honor food pref's.          Reason for Assessment       12/12/17 1028    Reason for Assessment    Reason For Assessment/Visit nurse/nurse practitioner consult              Nutrition/Diet History       12/12/17 1029    Nutrition/Diet History    Factors Affecting Nutritional Intake Factors    Other was less alert yesturday and po intake was down, more alert today and ate well for breakfast              Labs/Tests/Procedures/Meds       12/12/17 1029    Labs/Tests/Procedures/Meds    Diagnostic Test/Procedure Review reviewed    Labs/Tests Review Reviewed    Medication Review Reviewed, pertinent    Significant Vitals reviewed            Physical Findings       12/12/17 1029    Physical Appearance    Skin --   intact              Nutrition Prescription Ordered       12/12/17 1029    Nutrition Prescription PO    Current PO Diet Dysphagia    Dysphagia Level 4  Mechanical soft no mixed consistencies    Common Modifiers GI Soft/[place]    Other Modifiers --   no straws            Evaluation of Received Nutrient/Fluid Intake       12/12/17 1030    PO Evaluation    % PO Intake good po intake this am            Problem/Interventions:                  Intervention Goal       12/12/17 1032    Intervention Goal    General Maintain nutrition    PO PO intake (%)    PO Intake % 75 %    Weight Maintain weight            Nutrition Intervention       12/12/17 1031    Nutrition Intervention    RD/Tech Action Interview for preference;Follow Tx progress;Care plan reviewd   more alert today              Education/Evaluation       12/12/17 1032    Education    Education Will Instruct as appropriate    Monitor/Evaluation    Monitor Per protocol        Electronically signed by:  Stella Toledo RD  12/12/17 10:32 AM

## 2017-12-12 NOTE — PLAN OF CARE
Problem: Patient Care Overview (Adult)  Goal: Plan of Care Review  Outcome: Ongoing (interventions implemented as appropriate)    12/12/17 3018   Coping/Psychosocial Response Interventions   Plan Of Care Reviewed With patient   Outcome Evaluation   Outcome Summary/Follow up Plan Pt increased ambulation distance. Less cueing and assist for steering RW today. Gave pt tasks and cues to look L during ambulation. Pt also instructed on w/c propulsion, but required more assist and cueing due to L neglect.

## 2017-12-12 NOTE — THERAPY TREATMENT NOTE
Acute Care - Physical Therapy Treatment Note  [facility]     Patient Name: Angelita Brambila  : 1965  MRN: [id number]  Today's Date: 2017  Onset of Illness/Injury or Date of Surgery Date: 17  Date of Referral to PT: 17  Referring Physician: Rajendra    Admit Date: 2017    Visit Dx:    ICD-10-CM ICD-9-CM   1. Cerebrovascular accident (CVA), unspecified mechanism I63.9 434.91   2. Hypokalemia E87.6 276.8   3. Hemiparesis affecting left side as late effect of cerebrovascular accident I69.354 438.20     Patient Active Problem List   Diagnosis   • Complete tear of right rotator cuff   • Precordial pain   • Dyspnea on exertion   • Palpitations   • Essential hypertension   • Hyperlipidemia   • Tobacco abuse   • CVA (cerebral vascular accident)               Adult Rehabilitation Note       17 0834 17 1405 17 1300    Rehab Assessment/Intervention    Discipline physical therapy assistant  -RW occupational therapist  -LE physical therapy assistant  -RW    Document Type therapy note (daily note)  -RW therapy note (daily note)  -LE therapy note (daily note)  -RW    Subjective Information agree to therapy;no complaints  -RW agree to therapy  -LE agree to therapy;no complaints  -RW    Patient Effort, Rehab Treatment good  -RW good  -LE good  -RW    Precautions/Limitations fall precautions  -RW fall precautions  -LE fall precautions  -RW    Specific Treatment Considerations  --   pt reports R wrist fracture. states has not been splinted  -LE     Recorded by [RW] Laura Arteaga PTA [LE] Jennifer Valentin OTR [RW] Laura Arteaga PTA    Vital Signs    O2 Delivery Pre Treatment  room air  -LE     O2 Delivery Intra Treatment  room air  -LE     O2 Delivery Post Treatment  room air  -LE     Pre Patient Position  Supine  -LE     Intra Patient Position  Standing  -LE     Post Patient Position  Supine  -LE     Recorded by  [LE] BRANT Cee     Pain Assessment    Pain Assessment No/denies  pain  -RW 0-10  -LE No/denies pain  -RW    Pain Score  5  -LE     Pain Location  Hip  -LE     Pain Orientation  Right  -LE     Pain Intervention(s)  Repositioned;Rest  -LE     Recorded by [RW] Laura Arteaga PTA [LE] BRANT Cee [RW] Laura Arteaga PTA    Vision Assessment/Intervention    Visual Impairment inattention to the left;left neglect  -RW  inattention to the left  -RW    Recorded by [RW] Laura Arteaga PTA  [RW] Laura Arteaga PTA    Cognitive Assessment/Intervention    Current Cognitive/Communication Assessment impaired  -RW impaired  -LE impaired  -RW    Orientation Status oriented x 4  -RW  oriented to;person;place;time  -RW    Follows Commands/Answers Questions 100% of the time;needs cueing;needs increased time  -RW needs cueing;needs increased time;needs repetition  -% of the time;needs cueing  -RW    Personal Safety mild impairment;at risk behaviors demonstrated;impulsive  -RW mild impairment  -LE mild impairment  -RW    Personal Safety Interventions fall prevention program maintained;gait belt;nonskid shoes/slippers when out of bed  -RW fall prevention program maintained;gait belt;nonskid shoes/slippers when out of bed  -LE fall prevention program maintained;gait belt;nonskid shoes/slippers when out of bed  -RW    Recorded by [RW] Laura Arteaga PTA [LE] BRANT Cee [RW] Laura Arteaga PTA    ROM (Range of Motion)    General ROM  --   L UE 7/8  w/ cues to reach end range  -LE     Recorded by  [RAYMOND] BRANT Cee     MMT (Manual Muscle Testing)    General MMT Assessment  --   LUE 4/5.  decreased control  -LE     Recorded by  [LE] BRANT Cee     Mobility Assessment/Training    Additional Documentation Wheelchair Training/Management (Group)  -RW      Recorded by [RW] Laura Arteaga PTA      Bed Mobility, Assessment/Treatment    Bed Mobility, Assistive Device bed rails;head of bed elevated  -RW head of bed elevated;bed rails  -LE head of bed elevated  -RW    Bed Mob,  Supine to Sit, [place] conditional independence  -RW conditional independence  -LE supervision required;verbal cues required  -RW    Bed Mob, Sit to Supine, [place] conditional independence  -RW conditional independence  -LE supervision required;verbal cues required  -RW    Bed Mobility, Impairments  --   when first enter pt has legs half off edge of bed  -LE     Recorded by [RW] Laura Arteaga PTA [LE] Jennifer Valentin, OTR [RW] Laura Arteaga PTA    Transfer Assessment/Treatment    Transfers, Bed-Chair [place] stand by assist;verbal cues required   bed to w/c  -RW      Transfers, Chair-Bed [place] not tested  -RW      Transfers, Sit-Stand [place] stand by assist  -RW supervision required   close SBA  -LE contact guard assist;verbal cues required  -RW    Transfers, Stand-Sit [place] stand by assist  -RW supervision required  -LE contact guard assist;verbal cues required  -RW    Transfers, Sit-Stand-Sit, Assist Device  rolling walker  -LE rolling walker  -RW    Toilet Transfer, [place]  supervision required  -LE     Toilet Transfer, Assistive Device  rolling walker  -LE     Transfer, Impairments   strength decreased;impaired balance  -RW    Recorded by [RW] Laura Arteaga PTA [LE] Jennifer Valentin, OTR [RW] Laura Arteaga PTA    Gait Assessment/Treatment    Gait, [place] Level stand by assist;verbal cues required  -RW  contact guard assist;minimum assist (75% patient effort);verbal cues required  -RW    Gait, Assistive Device rolling walker  -RW  rolling walker  -RW    Gait, Distance (Feet) 400  -RW  200  -RW    Gait, Gait Pattern Analysis swing-through gait  -RW  swing-through gait  -RW    Gait, Gait Deviations forward flexed posture;bilateral:;ronnell decreased  -RW  forward flexed posture;bilateral:;ataxia;step length decreased;stride length decreased  -RW    Gait, Safety Issues   step length decreased;balance decreased during turns  -RW    Gait, Impairments strength  decreased;impaired balance  -RW  strength decreased;impaired balance  -RW    Gait, Comment Pt given cueing and tasks to look left during ambulation and tolerated well  -RW  Assist guiding RW. Veers to L and running into obstacles on L side.  -RW    Recorded by [RW] Laura Arteaga PTA  [RW] Laura Arteaga PTA    Functional Mobility    Functional Mobility- Ind. Level  supervision required  -LE     Functional Mobility- Device  rolling walker  -LE     Functional Mobility-Distance (Feet)  20  -LE     Functional Mobility- Comment  light grasp R hand due to ? wrist fracture  -LE     Recorded by  [LE] BRANT Cee     Wheelchair Training/Management    Wheelchair Transfer Type stand pivot transfer  -RW      Wheelchair Task Training armrest management;footrest management;leg rest management;wheel lock management;safety considerations  -RW      Wheelchair Task Training Comment Educated pt and pt verbally understood  -RW      Wheelchair Propulsion Training forward propulsion;backward propulsion;moving through doorways;steering wheelchair;turning wheelchair  -RW      Wheelchair Propulsion Training Comment [place] required due to L sided weakness/neglect.   -RW      Wheelchair, Distance Propelled 80  -RW      Recorded by [RW] Laura Arteaga PTA      Toileting Assessment/Training    Toileting Assess/Train, Position  sitting;supported standing  -LE     Toileting Assess/Train, Indepen Level  supervision required  -LE     Recorded by  [LE] BRANT Cee     Grooming Assessment/Training    Grooming Assess/Train, Position  supported standing;sink side  -LE     Grooming Assess/Train, Indepen Level  set up required;supervision required;verbal cues required  -LE     Recorded by  [LE] BRANT Cee     Therapy Exercises    Bilateral Lower Extremities   AROM:;10 reps;standing;heel raises;hip abduction/adduction;mini squats  -RW    Bilateral Upper Extremity  AROM:;10 reps;supine;elbow flexion/extension;hand  pumps;pronation/supination;shoulder extension/flexion  -LE     Recorded by  [LE] Jennifer Valentin OTR [RW] Laura Arteaga PTA    Gross Motor Coordination Training    Gross Motor Skill, Impairments Detail  --   mod diff with B reciprocal coordination ex.    -LE     Recorded by  [LE] BRANT Cee     Positioning and Restraints    Pre-Treatment Position in bed  -RW in bed  -LE in bed  -RW    Post Treatment Position bed  -RW bed  -LE bed  -RW    In Bed fowlers;call light within reach;encouraged to call for assist;exit alarm on;SCD pump applied;notified nsg  -RW notified nsg;supine;call light within reach;encouraged to call for assist;exit alarm on;SCD pump applied;heels elevated  -LE supine;call light within reach;encouraged to call for assist;exit alarm on;SCD pump applied  -RW    Recorded by [RW] Laura Arteaga PTA [LE] Jennifer Valentin OTR [RW] Laura Arteaag PTA      12/09/17 1324          Rehab Assessment/Intervention    Discipline physical therapist  -JR      Document Type therapy note (daily note)  -JR      Subjective Information no complaints;agree to therapy  -JR      Patient Effort, Rehab Treatment good  -JR      Symptoms Noted During/After Treatment fatigue  -JR      Recorded by [JR] Mihai Valenzuela, PT      Pain Assessment    Pain Assessment No/denies pain  -JR      Recorded by [JR] Mihai Valenzuela, PT      Cognitive Assessment/Intervention    Current Cognitive/Communication Assessment functional  -JR      Orientation Status oriented x 4  -JR      Follows Commands/Answers Questions 100% of the time;needs cueing;needs increased time  -JR      Personal Safety WNL/WFL  -JR      Personal Safety Interventions gait belt;fall prevention program maintained;muscle strengthening facilitated;nonskid shoes/slippers when out of bed   bed alarm was on at beginning of PT  -JR      Recorded by [JR] Mihai Valenzuela, PT      Bed Mobility, Assessment/Treatment    Bed Mobility, Scoot/Bridge, [place] supervision  required;other (see comments)   ABLE TO BRIDGE IN ORDER TO POSITION IN BED.    -JR      Bed Mob, Supine to Sit, [place] contact guard assist  -JR      Bed Mob, Sit to Supine, [place] contact guard assist  -JR      Recorded by [JR] Mihai Valenzuela PT      Transfer Assessment/Treatment    Transfers, Sit-Stand [place] nonverbal cues required (demo/gesture);contact guard assist  -JR      Transfers, Stand-Sit [place] contact guard assist;nonverbal cues required (demo/gesture)  -JR      Transfer, Impairments impaired balance;strength decreased  -JR      Recorded by [JR] Mihai Valenzuela PT      Gait Assessment/Treatment    Gait, [place] Level contact guard assist  -JR      Gait, Distance (Feet) 200  -JR      Gait, Gait Deviations ronnell decreased;stride length decreased  -JR      Gait, Safety Issues step length decreased;balance decreased during turns  -JR      Gait, Impairments strength decreased;impaired balance  -JR      Recorded by [JR] Mihai Valenzuela PT      Stairs Assessment/Treatment    Number of Stairs 12  -JR      Stairs, Handrail Location right side (ascending)  -JR      Stairs, [place] Level contact guard assist  -JR      Stairs, Assistive Device --   NONE  -JR      Stairs, Technique Used step over step (ascending);step over step (descending)   AMBULATES SLOWLY AND CAUTIOUSLY.   -JR      Recorded by [JR] Mihai Valenzuela PT      Balance Skills Training    Standing-Level of Assistance Contact guard  -JR      Static Standing Balance Support No upper extremity supported  -JR      Standing-Balance Activities Tandem Stance;Retrieve object from floor;Single Limb Stance   X 5 MIN.   -JR      Recorded by [JR] Mihai Valenzuela PT      Positioning and Restraints    Pre-Treatment Position in bed  -JR      Post Treatment Position bed  -JR      In Bed notified nsg;supine;call light within reach;encouraged to call for assist;exit alarm on  -JR      Recorded by [JR] Mihai Valenzuela, PT         User Key  (r) = Recorded By, (t) = Taken By, (c) = Cosigned By    Initials Name Effective Dates    RAYMOND Valentin, OTR 04/13/15 -     JR Mihai Valenzuela, PT 01/07/16 -     RW Laura Arteaga, PTA 04/06/16 -                 IP PT Goals       12/11/17 1136 12/11/17 1135 12/03/17 1128    Bed Mobility PT LTG    Bed Mobility PT LTG, Date Established   12/03/17  -PC    Bed Mobility PT LTG, Time to Achieve  1 wk  -EE 1 wk  -PC    Bed Mobility PT LTG, Activity Type   supine to sit/sit to supine  -PC    Bed Mobility PT LTG, [place] Level   supervision required  -PC    Bed Mobility PT LTG, Date Goal Reviewed  12/11/17  -EE     Bed Mobility PT LTG, Outcome  goal ongoing  -EE     Bed Mobility PT LTG, Reason Goal Not Met  progress slower than expected  -EE     Transfer Training PT LTG    Transfer Training PT LTG, Date Established  12/11/17  -EE 12/03/17  -PC    Transfer Training PT LTG, Time to Achieve  1 wk  -EE 1 wk  -PC    Transfer Training PT LTG, Activity Type  all transfers  -EE sit to stand/stand to sit  -PC    Transfer Training PT LTG, [place] Level  supervision required  -EE contact guard assist  -PC    Transfer Training PT  LTG, Date Goal Reviewed  12/11/17  -EE     Transfer Training PT LTG, Outcome  goal revised  -EE     Gait Training PT LTG    Gait Training Goal PT LTG, Date Established  12/11/17  -EE 12/03/17  -PC    Gait Training Goal PT LTG, Time to Achieve  1 wk  -EE 1 wk  -PC    Gait Training Goal PT LTG, [place] Level  supervision required  -EE contact guard assist  -PC    Gait Training Goal PT LTG, Distance to Achieve  400  - ft with appropriate assistive device  -PC    Dynamic Standing Balance PT LTG    Dynamic Standing Balance PT LTG, Date Established 12/11/17  -EE      Dynamic Standing Balance PT LTG, Time to Achieve 1 wk  -EE      Dynamic Standing Balance PT LTG, [place] Level supervision required   during dynamic balance activities  -EE        User Key  (r) = Recorded By, (t) =  Taken By, (c) = Cosigned By    Initials Name Provider Type    PC Haily Champion, PT Physical Therapist    EE Saritha Way, PT Physical Therapist          Physical Therapy Education     Title: PT OT SLP Therapies (Active)     Topic: Physical Therapy (Done)     Point: Mobility training (Done)    Learning Progress Summary    Learner Readiness Method Response Comment Documented by Status   Patient Acceptance E,TB,D VU,NR  RW 12/12/17 0900 Done    Acceptance E,TB,D VU,NR  RW 12/11/17 1347 Done    Eager E VU,DU   12/09/17 1329 Done    Acceptance E,TB,D VU,NR  RW 12/08/17 0853 Done    Acceptance E,TB,D VU,DU,NR  LB 12/07/17 1446 Done    Acceptance E,TB NR  EE 12/06/17 0945 Active    Acceptance E,TB NR  EE 12/05/17 1116 Active    Acceptance E,TB NR  EE 12/04/17 1500 Active    Acceptance E,D NR  PC 12/03/17 1127 Active               Point: Home exercise program (Done)    Learning Progress Summary    Learner Readiness Method Response Comment Documented by Status   Patient Acceptance E,TB,D VU,NR  RW 12/11/17 1347 Done    Eager E VU,DU   12/09/17 1329 Done    Acceptance E,TB,D VU,NR  RW 12/08/17 0853 Done    Acceptance E,TB,D VU,DU,NR  LB 12/07/17 1446 Done    Acceptance E,D NR  PC 12/03/17 1127 Active               Point: Body mechanics (Done)    Learning Progress Summary    Learner Readiness Method Response Comment Documented by Status   Patient Acceptance E,TB,D VU,NR  RW 12/12/17 0900 Done    Acceptance E,TB,D VU,NR  RW 12/11/17 1347 Done    Eager E VU,DU   12/09/17 1329 Done    Acceptance E,TB,D VU,NR  RW 12/08/17 0853 Done    Acceptance E,TB,D VU,DU,NR  LB 12/07/17 1446 Done    Acceptance E,TB NR  EE 12/06/17 0945 Active    Acceptance E,TB NR  EE 12/05/17 1116 Active    Acceptance E,TB NR  EE 12/04/17 1500 Active    Acceptance E,D NR  PC 12/03/17 1127 Active               Point: Precautions (Done)    Learning Progress Summary    Learner Readiness Method Response Comment Documented by Status   Patient Acceptance  E,TB,D VU,NR  RW 12/12/17 0900 Done    Acceptance E,TB,D VU,NR  RW 12/11/17 1347 Done    Eager E VU,DU  JR 12/09/17 1329 Done    Acceptance E,TB,D VU,NR  RW 12/08/17 0853 Done    Acceptance E,TB,D VU,DU,NR  LB 12/07/17 1446 Done    Acceptance E,TB NR  EE 12/06/17 0945 Active    Acceptance E,TB NR  EE 12/05/17 1116 Active    Acceptance E,TB NR  EE 12/04/17 1500 Active    Acceptance E,D NR  PC 12/03/17 1127 Active                      User Key     Initials Effective Dates Name Provider Type Discipline     12/01/15 -  Haily Champion, PT Physical Therapist PT    EE 12/01/15 -  Saritha Way, PT Physical Therapist PT    JR 01/07/16 -  Mihai Valenzuela, PT Physical Therapist PT    RW 04/06/16 -  Laura Arteaga, PTA Physical Therapy Assistant PT    LB 11/15/17 -  Krysten Bradley, SEAN PT Student PT                    PT Recommendation and Plan  Anticipated Discharge Disposition: inpatient rehabilitation facility  Planned Therapy Interventions: bed mobility training, balance training, gait training, home exercise program, strengthening, transfer training  PT Frequency: daily  Plan of Care Review  Plan Of Care Reviewed With: patient  Outcome Summary/Follow up Plan: Pt increased ambulation distance. Less cueing and assist for steering RW today. Gave pt tasks and cues to look L during ambulation. Pt also instructed on w/c propulsion, but required more assist and cueing due to L neglect.          Outcome Measures       12/12/17 0800 12/11/17 1412 12/11/17 1400    How much help from another person do you currently need...    Turning from your back to your side while in flat bed without using bedrails? 4  -RW      Moving from lying on back to sitting on the side of a flat bed without bedrails? 3  -RW      Moving to and from a bed to a chair (including a wheelchair)? 3  -RW      Standing up from a chair using your arms (e.g., wheelchair, bedside chair)? 3  -RW      Climbing 3-5 steps with a railing? 3  -RW      To walk in hospital  room? 3  -RW      AM-PAC 6 Clicks Score 19  -RW      How much help from another is currently needed...    Putting on and taking off regular lower body clothing?  3  -LE     Bathing (including washing, rinsing, and drying)  3  -LE     Toileting (which includes using toilet bed pan or urinal)  3  -LE     Putting on and taking off regular upper body clothing  3  -LE     Taking care of personal grooming (such as brushing teeth)  3  -LE     Eating meals  3  -LE     Score  18  -LE     Functional Assessment    Outcome Measure Options AM-PAC 6 Clicks Basic Mobility (PT)  -RW  AM-PAC 6 Clicks Daily Activity (OT)  -LE      12/11/17 1300 12/09/17 1329       How much help from another person do you currently need...    Turning from your back to your side while in flat bed without using bedrails? 3  -RW 3  -JR     Moving from lying on back to sitting on the side of a flat bed without bedrails? 3  -RW 3  -JR     Moving to and from a bed to a chair (including a wheelchair)? 3  -RW 3  -JR     Standing up from a chair using your arms (e.g., wheelchair, bedside chair)? 3  -RW 3  -JR     Climbing 3-5 steps with a railing? 3  -RW 3  -JR     To walk in hospital room? 3  -RW 3  -JR     AM-PAC 6 Clicks Score 18  -RW 18  -JR     Functional Assessment    Outcome Measure Options AM-PAC 6 Clicks Basic Mobility (PT)  -RW        User Key  (r) = Recorded By, (t) = Taken By, (c) = Cosigned By    Initials Name Provider Type    RAYMOND Valentin, OTR Occupational Therapist    JR Mihai Valenzuela, PT Physical Therapist    JORDY Arteaga PTA Physical Therapy Assistant           Time Calculation:         PT Charges       12/12/17 0858          Time Calculation    Start Time 0834  -RW      Stop Time 0857  -RW      Time Calculation (min) 23 min  -RW      PT Received On 12/12/17  -RW      PT - Next Appointment 12/13/17  -RW        User Key  (r) = Recorded By, (t) = Taken By, (c) = Cosigned By    Initials Name Provider Type    JORDY Arteaga PTA  Physical Therapy Assistant          Therapy Charges for Today     Code Description Service Date Service Provider Modifiers Qty    00543924660 HC PT THER PROC EA 15 MIN 12/11/2017 Laura Arteaga PTA GP 1    21571603703 HC PT THER PROC EA 15 MIN 12/12/2017 Laura Arteaga PTA GP 2          PT G-Codes  Outcome Measure Options: AM-PAC 6 Clicks Basic Mobility (PT)    Laura Arteaga PTA  12/12/2017

## 2017-12-12 NOTE — TELEPHONE ENCOUNTER
12/12/17  11:43 AM  Angelita Brambila  1965    Home Phone [phone]   Mobile [phone]     Ms. Brambila is being discharged from [facility] with [facility]. She had a stroke and warfarin has been ordered by Dr. Shira Escobedo but they are not going to follow it. Coby from [facility] is calling to see if our office will manage warfarin/ INR?    Rina VERDUZCO RN

## 2017-12-12 NOTE — PLAN OF CARE
Problem: Fall Risk (Adult)  Goal: Identify Related Risk Factors and Signs and Symptoms  Outcome: Ongoing (interventions implemented as appropriate)  Goal: Absence of Falls  Outcome: Ongoing (interventions implemented as appropriate)    Problem: Patient Care Overview (Adult)  Goal: Plan of Care Review  Outcome: Ongoing (interventions implemented as appropriate)    12/12/17 0409   Coping/Psychosocial Response Interventions   Plan Of Care Reviewed With patient   Patient Care Overview   Progress improving   Outcome Evaluation   Outcome Summary/Follow up Plan Patient more alert and oriented tonight, c/o slight headache, up with assist, continue heparin gtt at 11units, will continue to monitor.

## 2017-12-13 ENCOUNTER — APPOINTMENT (OUTPATIENT)
Dept: GENERAL RADIOLOGY | Facility: HOSPITAL | Age: 52
End: 2017-12-13

## 2017-12-13 VITALS
SYSTOLIC BLOOD PRESSURE: 128 MMHG | WEIGHT: 146 LBS | OXYGEN SATURATION: 99 % | HEART RATE: 84 BPM | DIASTOLIC BLOOD PRESSURE: 78 MMHG | RESPIRATION RATE: 18 BRPM | BODY MASS INDEX: 24.32 KG/M2 | TEMPERATURE: 97.7 F | HEIGHT: 65 IN

## 2017-12-13 PROBLEM — E87.6 HYPOKALEMIA: Status: ACTIVE | Noted: 2017-12-13

## 2017-12-13 LAB
BASOPHILS # BLD AUTO: 0.02 10*3/MM3 (ref 0–0.2)
BASOPHILS NFR BLD AUTO: 0.3 % (ref 0–1.5)
DEPRECATED RDW RBC AUTO: 59.7 FL (ref 37–54)
EOSINOPHIL # BLD AUTO: 0.08 10*3/MM3 (ref 0–0.7)
EOSINOPHIL NFR BLD AUTO: 1.1 % (ref 0.3–6.2)
ERYTHROCYTE [DISTWIDTH] IN BLOOD BY AUTOMATED COUNT: 14.6 % (ref 11.7–13)
HCT VFR BLD AUTO: 39.3 % (ref 35.6–45.5)
HGB BLD-MCNC: 12.9 G/DL (ref 11.9–15.5)
IMM GRANULOCYTES # BLD: 0 10*3/MM3 (ref 0–0.03)
IMM GRANULOCYTES NFR BLD: 0 % (ref 0–0.5)
INR PPP: 3.43 (ref 0.9–1.1)
LYMPHOCYTES # BLD AUTO: 1.88 10*3/MM3 (ref 0.9–4.8)
LYMPHOCYTES NFR BLD AUTO: 25.3 % (ref 19.6–45.3)
MCH RBC QN AUTO: 36.6 PG (ref 26.9–32)
MCHC RBC AUTO-ENTMCNC: 32.8 G/DL (ref 32.4–36.3)
MCV RBC AUTO: 111.6 FL (ref 80.5–98.2)
MONOCYTES # BLD AUTO: 0.46 10*3/MM3 (ref 0.2–1.2)
MONOCYTES NFR BLD AUTO: 6.2 % (ref 5–12)
NEUTROPHILS # BLD AUTO: 4.99 10*3/MM3 (ref 1.9–8.1)
NEUTROPHILS NFR BLD AUTO: 67.1 % (ref 42.7–76)
PLATELET # BLD AUTO: 492 10*3/MM3 (ref 140–500)
PMV BLD AUTO: 10.4 FL (ref 6–12)
PROTHROMBIN TIME: 33.5 SECONDS (ref 11.7–14.2)
RBC # BLD AUTO: 3.52 10*6/MM3 (ref 3.9–5.2)
WBC NRBC COR # BLD: 7.43 10*3/MM3 (ref 4.5–10.7)

## 2017-12-13 PROCEDURE — 74230 X-RAY XM SWLNG FUNCJ C+: CPT

## 2017-12-13 PROCEDURE — 92611 MOTION FLUOROSCOPY/SWALLOW: CPT

## 2017-12-13 PROCEDURE — 85610 PROTHROMBIN TIME: CPT | Performed by: INTERNAL MEDICINE

## 2017-12-13 PROCEDURE — 85025 COMPLETE CBC W/AUTO DIFF WBC: CPT | Performed by: INTERNAL MEDICINE

## 2017-12-13 PROCEDURE — 97535 SELF CARE MNGMENT TRAINING: CPT | Performed by: OCCUPATIONAL THERAPIST

## 2017-12-13 RX ORDER — FOLIC ACID 1 MG/1
1 TABLET ORAL DAILY
Qty: 30 TABLET | Refills: 1 | Status: SHIPPED | OUTPATIENT
Start: 2017-12-14

## 2017-12-13 RX ORDER — WARFARIN SODIUM 4 MG/1
4 TABLET ORAL
Qty: 30 TABLET | Refills: 1 | Status: SHIPPED | OUTPATIENT
Start: 2017-12-13 | End: 2018-01-04

## 2017-12-13 RX ORDER — QUETIAPINE FUMARATE 25 MG/1
25 TABLET, FILM COATED ORAL NIGHTLY
Qty: 30 TABLET | Refills: 1 | Status: SHIPPED | OUTPATIENT
Start: 2017-12-13 | End: 2019-01-17

## 2017-12-13 RX ORDER — ATORVASTATIN CALCIUM 80 MG/1
80 TABLET, FILM COATED ORAL NIGHTLY
Qty: 30 TABLET | Refills: 2 | Status: SHIPPED | OUTPATIENT
Start: 2017-12-13

## 2017-12-13 RX ORDER — NICOTINE 21 MG/24HR
1 PATCH, TRANSDERMAL 24 HOURS TRANSDERMAL EVERY 24 HOURS
Qty: 21 PATCH | Refills: 0 | Status: SHIPPED | OUTPATIENT
Start: 2017-12-14 | End: 2020-02-20 | Stop reason: HOSPADM

## 2017-12-13 RX ORDER — LANOLIN ALCOHOL/MO/W.PET/CERES
100 CREAM (GRAM) TOPICAL DAILY
Qty: 30 TABLET | Refills: 1 | Status: SHIPPED | OUTPATIENT
Start: 2017-12-14

## 2017-12-13 RX ADMIN — NICOTINE 1 PATCH: 21 PATCH, EXTENDED RELEASE TRANSDERMAL at 11:19

## 2017-12-13 RX ADMIN — Medication 200 MG: at 10:00

## 2017-12-13 RX ADMIN — ASPIRIN 81 MG: 81 TABLET, CHEWABLE ORAL at 10:01

## 2017-12-13 RX ADMIN — FOLIC ACID 1 MG: 1 TABLET ORAL at 10:00

## 2017-12-13 RX ADMIN — AMLODIPINE BESYLATE 10 MG: 10 TABLET ORAL at 10:01

## 2017-12-13 RX ADMIN — POLYETHYLENE GLYCOL 3350 17 G: 17 POWDER, FOR SOLUTION ORAL at 10:02

## 2017-12-13 RX ADMIN — PANTOPRAZOLE SODIUM 40 MG: 40 TABLET, DELAYED RELEASE ORAL at 10:02

## 2017-12-13 NOTE — PAYOR COMM NOTE
"Angelita Carter (52 y.o. Female)     Date of Birth Social Security Number Address Home Phone MRN    1965  [id number] [facility] [id number] [phone] [id number]    Druze Marital Status          Islam        Admission Date Admission Type Admitting Provider Attending Provider Department, Room/Bed    12/1/17 Emergency Aly Drew MD  [address], 78/1    Discharge Date Discharge Disposition Discharge Destination        12/13/2017 Home or Self Care             Attending Provider: (none)    Allergies:  Sulfa Antibiotics, Beta Adrenergic Blockers, Codeine, Erythromycin, Penicillins, Tetracyclines & Related, Varenicline    Isolation:  None   Infection:  None   Code Status:  FULL    Ht:  165.1 cm (65\")   Wt:  66.2 kg (146 lb)    Admission Cmt:  None   Principal Problem:  None                Active Insurance as of 12/1/2017     Primary Coverage     Payor Plan Insurance Group Employer/Plan Group    [facility] BLUE [facility] EMPLOYEE [id number]     Payor Plan Address Payor Plan Phone Number Effective From Effective To    [address] [phone] 1/1/2015     [place] [address]       Subscriber Name Subscriber Birth Date Member ID       ANGELITA CARTER 1965 [id number]                 Emergency Contacts      (Rel.) Home Phone Work Phone Mobile Phone    Letty Renner (Daughter) -- [phone]    Bekah Rosales (Relative) -- -- [phone]               Discharge Summary      Court Rapp MD at 12/13/2017  1:06 PM            DISCHARGE SUMMARY    Angelita Carter  1965  female  52 y.o.    Date of Admission: 12/1/2017  Date of Discharge:  12/13/2017    PCP: Ivonne Bradley MD    Discharge Diagnosis:  Acute alcohol withdrawal  CVA (cerebral vascular accident)  Oropharyngeal dysphagia, improved  Left hemiparesis  Hypertension  Hemorrhagic transformation of CVA  Compression of brain      Hospital Course " (brief)  This is a 52-year-old female who has a history of tobacco or alcohol abuse presented to emergency room with a history of having left-sided weakness.  She was given TPA by neurology and she was admitted to ICU for further care.  She has a small hemorrhagic transformation.  She also underwent a corrected as ultrasound which showed no significant stenosis.  After initial stabilization patient was started on heparin as suggested by neurosurgery and later was converted to Coumadin.  The heparin has been discontinued and she is on Coumadin at 5 mg but will be reduced to 4 mg as her INR is 3.6.  He also has significant dysphagia and she worked with the speech therapy and she has significantly improved.  Now her diet has been changed to regular diet.  She also has a history of alcohol abuse in the past and was given thiamine and folic acid.  She still has weakness on the left side and will be discharged on Coumadin at 4 mg along with other medications including hypertensives and thiamine and folic acid.  She has a follow-up with neurosurgery in 2 weeks.  She overall also follow-up with her primary care physician in about 1 month      Consults:  Neurology  Neurosurgery  Speech therapy  PT and OT    Pertinent Test Results:    Results from last 7 days  Lab Units 17  0739 17  0459 17  0447   WBC 10*3/mm3 7.43 7.17 8.10   HEMOGLOBIN g/dL 12.9 12.0 12.0   HEMATOCRIT % 39.3 35.8 36.7   PLATELETS 10*3/mm3 492 406 416           Results from last 7 days  Lab Units 17  0739 17  0459 17  0447   INR  3.43* 2.69* 1.56*       Condition on Discharge:   Stable.    Vital Signs past 24hrs  BP range: BP: (119-138)/(78-90) 128/78  Pulse range: Heart Rate:  [70-91] 84  Resp rate range: Resp:  [18] 18  Temp range: Temp (24hrs), Av.1 °F (36.7 °C), Min:97.7 °F (36.5 °C), Max:98.4 °F (36.9 °C)    O2 Device: room air   Oxygen range:SpO2:  [97 %-100 %] 99 %   66.2 kg (146 lb); Body mass index is 24.3  kg/(m^2).  No intake or output data in the 24 hours ending 12/13/17 1311    Physical Exam   Constitutional: She is oriented to person, place, and time. She appears well-developed and well-nourished. No distress.   HENT:   Head: Normocephalic and atraumatic.   Eyes: Pupils are equal, round, and reactive to light. No scleral icterus.   Cardiovascular: Normal rate and regular rhythm.    Pulmonary/Chest: Effort normal. No respiratory distress. She has no decreased breath sounds. She has no wheezes.   Abdominal: Soft. Bowel sounds are normal. There is no hepatosplenomegaly.   Neurological: She is alert and oriented to person, place, and time.   Weakness on the left side   Skin: No cyanosis. Nails show no clubbing.   Psychiatric: She has a normal mood and affect. Her behavior is normal.       Discharge Disposition  Home or Self Care    Discharge Medications   Angelita Brambila   [place] Medication Instructions GIRISH:[id number]    Printed on:12/13/17 1311   Medication Information                      amLODIPine (NORVASC) 5 MG tablet  Take 5 mg by mouth Daily.             atorvastatin (LIPITOR) 80 MG tablet  Take 1 tablet by mouth Every Night.             fluticasone (FLONASE) 50 MCG/ACT nasal spray  2 sprays into each nostril once daily.             folic acid (FOLVITE) 1 MG tablet  Take 1 tablet by mouth Daily.             nicotine (NICODERM CQ) 21 MG/24HR patch  Place 1 patch on the skin Daily.             pantoprazole (PROTONIX) 40 MG EC tablet  Take 1 tablet by mouth daily.             QUEtiapine (SEROquel) 25 MG tablet  Take 1 tablet by mouth Every Night.             rOPINIRole (REQUIP) 1 MG tablet  Take 1 mg by mouth 2 (Two) Times a Day As Needed. Take 1 hour before bedtime.             thiamine (VITAMIN B1) 100 MG tablet  Take 1 tablet by mouth Daily.             traZODone (DESYREL) 50 MG tablet  Take 1 tablet by mouth every night at bedtime.             venlafaxine XR (EFFEXOR-XR) 75 MG 24 hr capsule  Take 225 mg by  mouth Daily.             warfarin (COUMADIN) 4 MG tablet  Take 1 tablet by mouth Daily.                 Discharge Diet:  Diet Order   Procedures   • Diet Regular; Thin; GI Soft / Boyle       Activity at Discharge:  As tolerated     Follow-up Appointments  Follow-up Information     Follow up with Sanju Monson MD Follow up in 2 week(s).    Specialties:  Neurosurgery, Radiology    Why:  with repeat carotid dopplar    Contact information:    [address]  [facility] [address]  [facility] [id number] [phone]          Follow up with [facility] [place] .    Specialty:  Home Health Services    Contact information:    [address] [facility] [address]  [facility] [id number] [phone]        [facility]    Specialty:  Home Health Services    Contact information:    [address] [facility] [address]  [facility] [address]          Follow up with Ivonne Bradley MD Follow up in 1 month(s).    Specialty:  Family Medicine    Contact information:    [address] [facility] [address]  [phone]          Additional Instructions for the Follow-ups that You Need to Schedule     Ambulatory Referral to Home Health    As directed    Face to Face Visit Date:  12/13/2017    Follow-up Provider for Plan of Care?:  I treated the patient in an acute care facility and will not continue treatment after discharge.    Follow-up Provider:  IVORY STEPHENSON IV [[id number]]    Reason/Clinical Findings:  CVA    Describe mobility limitations that make leaving home difficult:  Stroke    Nursing/Therapeutic Services Requested:  Medical / Social Work (INR)    Frequency:  Other (2 times a week)                         Court Rapp MD, [facility]  Pulmonary, Critical Care and Sleep Medicine.  [facility]    12/13/2017 ,    Time: I spent more than 30 min in the discharge planning of this patient.    Much of this encounter note is an  electronic transcription/translation of spoken language to printed text. The electronic translation of spoken language may permit erroneous, or at times, nonsensical words or phrases to be inadvertently transcribed; Although I have reviewed the note for such errors, some may still exist.      Electronically signed by Court Rapp MD at 12/13/2017  1:13 PM

## 2017-12-13 NOTE — DISCHARGE INSTR - DIET
Regular diet, no mixed consistencies and was given a listing by ST So.  Explained what is meant by mixed consistencies and cautioned about aspiration pneumonia.

## 2017-12-13 NOTE — MBS/VFSS/FEES
Acute Care - Speech Language Pathology   Swallow Re-Evaluation [facility]     Patient Name: Angelita Brambila  : 1965  MRN: [id number]  Today's Date: 2017  Onset of Illness/Injury or Date of Surgery Date: 17            Admit Date: 2017  SPEECH-LANGUAGE PATHOLOGY EVALUTION - VFSS  Subjective: The patient was seen on this date for a VFSS(Videofluoroscopic Swallowing Study).  Patient was alert and cooperative.    Objective: Risks/benefits were reviewed with the patient, and consent was obtained. The study was completed with SLP present and Radiologist review. The patient was seen in lateral view(s). Textures given included thin liquid, puree consistency, mechanical soft consistency and regular consistency.  Assessment: Pt presents with mild oropharyngeal dysphagia characterized by sluggish hyolarungeal elevation/excursion. No penetration or aspiration with thin via cup or straw, puree, and regular texture. Trace silent posterior penetration with subsequent silent trace aspiration X1 with mixed consistency. No pharyngeal residue post swallow with any textures trialed this date.    SLP Findings: Patient presents with mild oropharyngeal dysphagia.   Comments: Provided pt with handout regarding mixed consistencies and item to avoid.  Recommendations: Diet Textures: thin liquid, regular consistency food with no mixed consistencies. Medications should be taken whole with thin or puree.   Recommended Strategies: Upright for PO and may use straw. Oral care before breakfast, after all meals and PRN.  Other Recommended Evaluations:     Dysphagia therapy is recommended. Rationale: f/u VFSS 2-4 weeks.  Continued tx for f/u on dysphagia and cognitive-linguistic function  Visit Dx:     ICD-10-CM ICD-9-CM   1. Cerebrovascular accident (CVA), unspecified mechanism I63.9 434.91   2. Hypokalemia E87.6 276.8   3. Hemiparesis affecting left side as late effect of cerebrovascular accident I69.354 438.20      Patient Active Problem List   Diagnosis   • Complete tear of right rotator cuff   • Precordial pain   • Dyspnea on exertion   • Palpitations   • Essential hypertension   • Hyperlipidemia   • Tobacco abuse   • CVA (cerebral vascular accident)     Past Medical History:   Diagnosis Date   • Allergic rhinitis    • Ankle swelling    • Anxiety    • Arthritis    • Asthma    • Attention deficit hyperactivity disorder    • Back pain    • Chest pain    • CHF (congestive heart failure)    • COPD (chronic obstructive pulmonary disease)    • Coronary artery disease    • Depression    • Esophageal reflux    • High cholesterol     Reported cholesterol level was high   • History of colonoscopy     Complete colonoscopy   • Hypercholesterolemia    • Hypertension    • IBS (irritable bowel syndrome)    • Insomnia    • Osteopenia    • Palpitations    • Rotator cuff tendonitis    • Seborrheic dermatitis    • Stroke    • Tendinitis, de Quervain's    • Tobacco use    • Vitamin B12 deficiency    • Vitamin D deficiency      Past Surgical History:   Procedure Laterality Date   • COLONOSCOPY     • DIAGNOSTIC LAPAROSCOPY EXPLORATORY LAPAROTOMY     • ELBOW ARTHROPLASTY Right     Elbow surgery   • FRACTURE SURGERY     • HYSTERECTOMY  1999    Including both ovaries   • TONSILLECTOMY     • WRIST SURGERY Left           SWALLOW EVALUATION (last 72 hours)      Swallow Evaluation       12/13/17 0915                Rehab Evaluation    Document Type evaluation  -OC        Subjective Information no complaints;agree to therapy  -OC        Patient Effort, Rehab Treatment good  -OC        Symptoms Noted During/After Treatment none  -OC        General Information    Patient Profile Review yes  -OC        Current Diet Limitations mechanical soft;thin liquids  -OC        Plans/Goals Discussed With patient  -OC        Barriers to Rehab cognitive status  -OC        Clinical Impression    Patient's Goals For Discharge return to regular diet  -OC        SLP  Swallowing Diagnosis mild dysphagia;moderate dysphagia;oral dysfunction;pharyngeal dysfunction  -OC        Rehab Potential/Prognosis, Swallowing good, to achieve stated therapy goals  -OC        Criteria for Skilled Therapeutic Interventions Met skilled criteria for dysphagia intervention met  -OC        FCM, Swallowing 6-->Level 6  -OC        Therapy Frequency PRN  -OC        Predicted Duration Therapy Interv (days) until discharge  -OC        Expected Duration Therapy Session (min) 15-30 minutes  -OC        SLP Diet Recommendation regular textures;thin liquids;other (see comments)   No mixed consistencies  -OC        Recommended Diagnostics reassess via VFSS (MBS)   2-4 weeks  -OC        Recommended Feeding/Eating Techniques maintain upright posture during/after eating for 30 mins  -OC        SLP Rec. for Method of Medication Administration meds whole with thin liquid;meds whole in pudding/applesauce  -OC        Monitor For Signs Of Aspiration none - silent aspiration present  -OC        Anticipated Discharge Disposition home with assist  -OC        Pain Assessment    Pain Assessment No/denies pain  -OC        Cognitive Assessment/Intervention    Current Cognitive/Communication Assessment impaired  -OC        SLP Communication to Radiology    Summary Statement Pt presents with mild oropharyngeal dysphagia characterized by sluggish hyolarungeal elevation/excursion. No pen/asp with thin via cup or straw, puree, and regular texture. Trace silent posterior penetration with subsequent silnt trace aspiration X1 with mixed consistency. No pharyngeal residue post swallow with any textures trialed.  -OC          User Key  (r) = Recorded By, (t) = Taken By, (c) = Cosigned By    Initials Name Effective Dates    OC So Penn MA,CCC-SLP 04/05/17 -         EDUCATION  The patient has been educated in the following areas:   Dysphagia (Swallowing Impairment).    SLP Recommendation and Plan  SLP Swallowing Diagnosis: mild  dysphagia, moderate dysphagia, oral dysfunction, pharyngeal dysfunction  SLP Diet Recommendation: regular textures, thin liquids, other (see comments) (No mixed consistencies)  Recommended Feeding/Eating Techniques: maintain upright posture during/after eating for 30 mins  SLP Rec. for Method of Medication Administration: meds whole with thin liquid, meds whole in pudding/applesauce  Monitor For Signs Of Aspiration: none - silent aspiration present  Recommended Diagnostics: reassess via VFSS ([facility]) (2-4 weeks)  Criteria for Skilled Therapeutic Interventions Met: skilled criteria for dysphagia intervention met  Anticipated Discharge Disposition: home with assist  Rehab Potential/Prognosis, Swallowing: good, to achieve stated therapy goals  Therapy Frequency: PRN             Plan of Care Review  Plan Of Care Reviewed With: patient  Outcome Summary/Follow up Plan: VFSS completed. Recommend regular, no mixed consistencies, with thin liquids. Meds whole with think or puree.  Recommend continued ST at next level of care for  f/u with dysphagia and cogntitive-linguistic  function.           IP SLP Goals       12/13/17 0915 12/07/17 1030 12/06/17 1113    Safely Consume Diet    Safely Consume Diet- SLP, Time to Achieve other (see comments)   2-4 weeks, repeat VFSS.  -OC      Safely Consume Diet- SLP, Activity Level   Patient will improve oral skills for more efficient eating;Patient will improve hyolaryngeal excursion for safer, more efficient swallow;Patient will improve tongue base/pharyngeal wall squeeze for safer, more efficient swallow  -SA    Safely Consume Diet- SLP, Date Goal Reviewed   12/06/17  -SA    Safely Consume Diet- SLP, Outcome goal ongoing  -OC  goal ongoing  -SA    Cognitive Linguistic- Improve Safety and Awareness    Cognitive Linguistic Improve Safety and Awareness- SLP, Date Established  12/07/17  -OC     Cognitive Linguistic Improve Safety and Awareness- SLP, Time to Achieve  by discharge  -OC      Cognitive Linguistic Improve Safety and Awareness- SLP, Outcome  goal ongoing  -OC       12/04/17 0915 12/02/17 1446       Safely Consume Diet    Safely Consume Diet- SLP, Date Established  12/02/17  -CP     Safely Consume Diet- SLP, Time to Achieve  by discharge  -CP     Safely Consume Diet- SLP, Outcome goal ongoing  -        User Key  (r) = Recorded By, (t) = Taken By, (c) = Cosigned By    Initials Name Provider Type    LIS Penn MA,[facility]-SLP Speech and Language Pathologist    CP Mei Park, MS CCC-SLP Speech and Language Pathologist    SH Beronica Devi, MS CCC-SLP Speech and Language Pathologist    SA Heather Amin, MS CCC-SLP Speech and Language Pathologist             SLP Outcome Measures (last 72 hours)      SLP Outcome Measures       12/13/17 0915          SLP Outcome Measures    Outcome Measure Used? Adult NOMS  -OC      FCM Scores    FCM Chosen Swallowing  -OC      Swallowing FCM Score 6  -OC        User Key  (r) = Recorded By, (t) = Taken By, (c) = Cosigned By    Initials Name Effective Dates    OC So Penn MA,CCC-SLP 04/05/17 -            Time Calculation:         Time Calculation- SLP       12/13/17 1014          Time Calculation- SLP    SLP Start Time 0830  -OC      SLP Stop Time 0930  -      SLP Time Calculation (min) 60 min  -      SLP Received On 12/13/17  -OC        User Key  (r) = Recorded By, (t) = Taken By, (c) = Cosigned By    Initials Name Provider Type    LIS Penn MA,CCC-SLP Speech and Language Pathologist          Therapy Charges for Today     Code Description Service Date Service Provider Modifiers Qty    [id number] HC ST MOTION FLUORO EVAL SWALLOW 4 12/13/2017 So Penn MA,CCC-SLP GN 1               So Penn MA,NOÉ-SLP  12/13/2017

## 2017-12-13 NOTE — THERAPY TREATMENT NOTE
Acute Care - Occupational Therapy Treatment Note  [facility]     Patient Name: Angelita Brambila  : 1965  MRN: [id number]  Today's Date: 2017  Onset of Illness/Injury or Date of Surgery Date: 17     Referring Physician: Rajendra      Admit Date: 2017    Visit Dx:     ICD-10-CM ICD-9-CM   1. Cerebrovascular accident (CVA), unspecified mechanism I63.9 434.91   2. Hypokalemia E87.6 276.8   3. Hemiparesis affecting left side as late effect of cerebrovascular accident I69.354 438.20     Patient Active Problem List   Diagnosis   • Complete tear of right rotator cuff   • Precordial pain   • Dyspnea on exertion   • Palpitations   • Essential hypertension   • Hyperlipidemia   • Tobacco abuse   • CVA (cerebral vascular accident)   • Hypokalemia             Adult Rehabilitation Note       17 1231 17 1300 17 0834    Rehab Assessment/Intervention    Discipline occupational therapist  -KP occupational therapist  -HC physical therapy assistant  -RW    Document Type therapy note (daily note)  -KP therapy note (daily note)  - therapy note (daily note)  -    Subjective Information agree to therapy;no complaints  -KP agree to therapy;no complaints  - agree to therapy;no complaints  -RW    Patient Effort, Rehab Treatment good  -KP good  -HC good  -RW    Symptoms Noted During/After Treatment none  -KP none  -     Precautions/Limitations fall precautions  -KP fall precautions  -HC fall precautions  -RW    Recorded by [KP] Brittanie Franco, OTR [HC] Vida Mcgarry OTR [RW] Laura Arteaga PTA    Pain Assessment    Pain Assessment No/denies pain  - No/denies pain  - No/denies pain  -RW    Recorded by [KP] Brittanie Franco, OTR [HC] Vida Mcgarry OTR [RW] Laura Arteaga PTA    Vision Assessment/Intervention    Visual Impairment  inattention to the left;left neglect  -HC inattention to the left;left neglect  -RW    Recorded by  [HC] BRANT Hicks [RW] Laura Arteaga,  PTA    Cognitive Assessment/Intervention    Current Cognitive/Communication Assessment impaired  -KP impaired  -HC impaired  -RW    Orientation Status oriented x 4  -KP oriented x 4  -HC oriented x 4  -RW    Follows Commands/Answers Questions 100% of the time;able to follow single-step instructions  -% of the time;needs cueing  -% of the time;needs cueing;needs increased time  -RW    Personal Safety mild impairment  -KP mild impairment  -HC mild impairment;at risk behaviors demonstrated;impulsive  -RW    Personal Safety Interventions fall prevention program maintained;gait belt;muscle strengthening facilitated;nonskid shoes/slippers when out of bed  -KP fall prevention program maintained;gait belt;nonskid shoes/slippers when out of bed  -HC fall prevention program maintained;gait belt;nonskid shoes/slippers when out of bed  -RW    Recorded by [KP] Brittanie Franco, OTR [HC] Vida Mcgarry, OTR [RW] Laura Arteaga, SEAN    Mobility Assessment/Training    Additional Documentation   Wheelchair Training/Management (Group)  -RW    Recorded by   [RW] Laura Arteaga PTA    Bed Mobility, Assessment/Treatment    Bed Mobility, Assistive Device   bed rails;head of bed elevated  -RW    Bed Mob, Supine to Sit, [place] independent  -  conditional independence  -RW    Bed Mob, Sit to Supine, [place] not tested  -  conditional independence  -RW    Bed Mobility, Comment  up in chair  -     Recorded by [KP] Brittanie Franco, OTR [HC] Vida Mcgarry, OTR [RW] Laura Arteaga PTA    Transfer Assessment/Treatment    Transfers, Bed-Chair [place]   stand by assist;verbal cues required   bed to w/c  -RW    Transfers, Chair-Bed [place]   not tested  -RW    Transfers, Sit-Stand [place] supervision required  - stand by assist  -HC stand by assist  -RW    Transfers, Stand-Sit [place] supervision required  - stand by assist  -HC stand by assist  -RW    Recorded by [KP] Brittanie  Eric Franco, JENAROR [HC] BRANT Hicks [RW] Laura Arteaga PTA    Gait Assessment/Treatment    Gait, [place] Level   stand by assist;verbal cues required  -RW    Gait, Assistive Device   rolling walker  -RW    Gait, Distance (Feet)   400  -RW    Gait, Gait Pattern Analysis   swing-through gait  -RW    Gait, Gait Deviations   forward flexed posture;bilateral:;ron[place] decreased  -RW    Gait, Impairments   strength decreased;impaired balance  -RW    Gait, Comment   Pt given cueing and tasks to look left during ambulation and tolerated well  -RW    Recorded by   [RW] Laura Arteaga PTA    Functional Mobility    Functional Mobility- Ind. Level set up required;supervision required  - supervision required  -     Functional Mobility-Distance (Feet)  --   to BR from chair  -     Functional Mobility- Comment used no device to walk into BR and stand at sink to brush teeth. sat for half of bathing  -KP      Recorded by [KP] Brittanie Franco OTR [HC] BRANT Hicks     Wheelchair Training/Management    Wheelchair Transfer Type   stand pivot transfer  -    Wheelchair Task Training   armrest management;footrest management;leg rest management;wheel lock management;safety considerations  -RW    Wheelchair Task Training Comment   Educated pt and pt verbally understood  -RW    Wheelchair Propulsion Training   forward propulsion;backward propulsion;moving through doorways;steering wheelchair;turning wheelchair  -    Wheelchair Propulsion Training Comment   Debo required due to L sided weakness/neglect.   -RW    Wheelchair, Distance Propelled   80  -RW    Recorded by   [RW] Laura Arteaga PTA    Upper Body Bathing Assessment/Training    UB Bathing Assess/Train, Position sitting;sink side  -      UB Bathing Assess/Train, [place] Level conditional independence;set up required;stand by assist  -KP      Recorded by [KP] Brittanie Franco OTR      Lower Body Bathing Assessment/Training    LB  "Bathing Assess/Train, Position sitting;standing  -KP      LB Bathing Assess/Train, [place] Level set up required;stand by assist  -      Recorded by [] Brittanie Franco OTR      Upper Body Dressing Assessment/Training    UB Dressing Assess/Train, Clothing Type doffing:;donning:;t-shirt  -KP      UB Dressing Assess/Train, Position sitting;sink side  -KP      UB Dressing Assess/Train, [place] set up required;minimum assist (75% patient effort)  -      UB Dressing Assess/Train, Comment sleeves turned inside out and needing A to fix it. pt attempted to but too difficult for her  -KP      Recorded by [] Brittanie Franco OTR      Lower Body Dressing Assessment/Training    LB Dressing Assess/Train, Clothing Type doffing:;donning:;pants;socks;shoes  -      LB Dressing Assess/Train, Position sitting;standing  -KP      LB Dressing Assess/Train, [place] set up required;supervision required  -KP      Recorded by [KP] Brittanie Franco OTR      Grooming Assessment/Training    Grooming Assess/Train, Position standing  - supported standing;sink side  -HC     Grooming Assess/Train, Indepen Level set up required;supervision required  - supervision required;set up required;verbal cues required  -     Grooming Assess/Train, Comment  Pt able to complete oral hygiene with items placed L and cues to scan Lto find requiring increased time but no assist. With increased time pt able to incorporate LUE during all tasks stating \"I'm trying to pay more attention to my left side.\"  -     Recorded by [KP] BRANT Quiles [HC] BRANT Hicks     Balance Skills Training    Sitting-Level of Assistance Close supervision  -      Sitting-Balance Support Feet supported  -      Standing-Level of Assistance Close supervision  - Close supervision  -     Static Standing Balance Support Right upper extremity supported;No upper extremity supported   intermittent UE support  - No upper " extremity supported  -HC     Recorded by [KP] BRANT Quiles [HC] BRANT Hicks     Therapy Exercises    BUE Resistance theraputty   provided pt w. theraputty for home for inc FM and hand stren  -KP      Recorded by [KP] BRANT Quiles      Fine Motor Coordination Training    Opposition Right:;Left:;intact  -KP      Recorded by [KP] BRANT Quiles      Functional Endurance    Detail (Functional Endurance) good  -KP      Recorded by [KP] BRANT Quiles      Neuromuscular Re-education    Neuromuscular Re-Ed Techniques --   ed pt on [facility] HEP. wrote program up for pt  -KP Fine motor task- grasp/release;Gross motor task- grasp/release;Attention to left side during functional activities  -HC     Recorded by [KP] BRANT Quiles [HC] BRANT Hicks     Positioning and Restraints    Pre-Treatment Position in bed  -KP sitting in chair/recliner  -HC in bed  -RW    Post Treatment Position chair  -KP chair  -HC bed  -RW    In Bed  sitting;call light within reach;encouraged to call for assist;exit alarm on  -HC fowlers;call light within reach;encouraged to call for assist;exit alarm on;SCD pump applied;notified nsg  -RW    In Chair sitting;call light within reach;encouraged to call for assist;exit alarm on  -KP      Recorded by [KP] BRANT Quiles [HC] BRANT Hicks [RW] Laura GRAVES Brandysana, PTA      12/11/17 1405 12/11/17 1300       Rehab Assessment/Intervention    Discipline occupational therapist  -LE physical therapy assistant  -RW     Document Type therapy note (daily note)  -LE therapy note (daily note)  -RW     Subjective Information agree to therapy  -LE agree to therapy;no complaints  -RW     Patient Effort, Rehab Treatment good  -LE good  -RW     Precautions/Limitations fall precautions  -LE fall precautions  -RW     Specific Treatment Considerations --   pt reports R wrist fracture. states has not been splinted  -LE      Recorded by [LE]  Jennifer Valentin OTR [RW] Laura Arteaga, SEAN     Vital Signs    O2 Delivery Pre Treatment room air  -LE      O2 Delivery Intra Treatment room air  -LE      O2 Delivery Post Treatment room air  -LE      Pre Patient Position Supine  -LE      Intra Patient Position Standing  -LE      Post Patient Position Supine  -LE      Recorded by [LE] BRANT Cee      Pain Assessment    Pain Assessment 0-10  -LE No/denies pain  -RW     Pain Score 5  -LE      Pain Location Hip  -LE      Pain Orientation Right  -LE      Pain Intervention(s) Repositioned;Rest  -LE      Recorded by [RAYMOND] BRANT Cee [RW] Laura Arteaga PTA     Vision Assessment/Intervention    Visual Impairment  inattention to the left  -RW     Recorded by  [RW] Laura Arteaga PTA     Cognitive Assessment/Intervention    Current Cognitive/Communication Assessment impaired  -LE impaired  -RW     Orientation Status  oriented to;person;place;time  -RW     Follows Commands/Answers Questions needs cueing;needs increased time;needs repetition  -% of the time;needs cueing  -RW     Personal Safety mild impairment  -LE mild impairment  -RW     Personal Safety Interventions fall prevention program maintained;gait belt;nonskid shoes/slippers when out of bed  -LE fall prevention program maintained;gait belt;nonskid shoes/slippers when out of bed  -RW     Recorded by [RAYMOND] BRANT Cee [RW] Laura Arteaga, PTA     ROM (Range of Motion)    General ROM --   L UE 7/8  w/ cues to reach end range  -LE      Recorded by [RAYMOND] BRANT Cee      MMT (Manual Muscle Testing)    General MMT Assessment --   LUE 4/5.  decreased control  -LE      Recorded by [LE] BRANT Cee      Bed Mobility, Assessment/Treatment    Bed Mobility, Assistive Device head of bed elevated;bed rails  -LE head of bed elevated  -RW     Bed Mob, Supine to Sit, [place] conditional independence  -LE supervision required;verbal cues required  -RW     Bed Mob, Sit to Supine, [place]  conditional independence  -LE supervision required;verbal cues required  -RW     Bed Mobility, Impairments --   when first enter pt has legs half off edge of bed  -LE      Recorded by [LE] BRANT Cee [RW] Laura Arteaga PTA     Transfer Assessment/Treatment    Transfers, Sit-Stand [place] supervision required   close SBA  -LE contact guard assist;verbal cues required  -RW     Transfers, Stand-Sit [place] supervision required  -LE contact guard assist;verbal cues required  -RW     Transfers, Sit-Stand-Sit, Assist Device rolling walker  -LE rolling walker  -RW     Toilet Transfer, [place] supervision required  -LE      Toilet Transfer, Assistive Device rolling walker  -LE      Transfer, Impairments  strength decreased;impaired balance  -RW     Recorded by [LE] BRANT Cee [RW] Laura Arteaga PTA     Gait Assessment/Treatment    Gait, [place] Level  contact guard assist;minimum assist (75% patient effort);verbal cues required  -RW     Gait, Assistive Device  rolling walker  -RW     Gait, Distance (Feet)  200  -RW     Gait, Gait Pattern Analysis  swing-through gait  -RW     Gait, Gait Deviations  forward flexed posture;bilateral:;ataxia;step length decreased;stride length decreased  -RW     Gait, Safety Issues  step length decreased;balance decreased during turns  -RW     Gait, Impairments  strength decreased;impaired balance  -RW     Gait, Comment  Assist guiding RW. Veers to L and running into obstacles on L side.  -RW     Recorded by  [RW] Laura Arteaga PTA     Functional Mobility    Functional Mobility- Ind. Level supervision required  -LE      Functional Mobility- Device rolling walker  -LE      Functional Mobility-Distance (Feet) 20  -LE      Functional Mobility- Comment light grasp R hand due to ? wrist fracture  -LE      Recorded by [LE] BRANT Cee      Toileting Assessment/Training    Toileting Assess/Train, Position sitting;supported standing  -LE      Toileting  Assess/Train, [place]ep[place] Level supervision required  -LE      Recorded by [LE] BRANT Cee      Grooming Assessment/Training    Grooming Assess/Train, Position supported standing;sink side  -LE      Grooming Assess/Train, [place]ep[place] Level set up required;supervision required;verbal cues required  -LE      Recorded by [RAYMOND] BRANT Cee      Therapy Exercises    Bilateral Lower Extremities  AROM:;10 reps;standing;heel raises;hip abduction/adduction;mini squats  -RW     Bilateral Upper Extremity AROM:;10 reps;supine;elbow flexion/extension;hand pumps;pronation/supination;shoulder extension/flexion  -LE      Recorded by [RAYMOND] BRANT Cee [RW] Laura Arteaga PTA     Gross Motor Coordination Training    Gross Motor Skill, Impairments Detail --   mod diff with B reciprocal coordination ex.    -LE      Recorded by [RAYMOND] BRANT Cee      Positioning and Restraints    Pre-Treatment Position in bed  -LE in bed  -RW     Post Treatment Position bed  -LE bed  -RW     In Bed notified nsg;supine;call light within reach;encouraged to call for assist;exit alarm on;SCD pump applied;heels elevated  -LE supine;call light within reach;encouraged to call for assist;exit alarm on;SCD pump applied  -RW     Recorded by [RAYMOND] BRANT Cee [RW] Laura Arteaga PTA       User Key  (r) = Recorded By, (t) = Taken By, (c) = Cosigned By    Initials Name Effective Dates    RAYMOND Valentin, OTCONOR 04/13/15 -     KP Brittanie Franco, OTR 04/13/15 -     JORDY Arteaga PTA 04/06/16 -     HC Vida Mcgarry, OTR 08/17/17 -                 OT Goals       12/05/17 1223          Transfer Training OT LTG    Transfer Training OT LTG, Date Established 12/05/17  -HC      Transfer Training OT LTG, Time to Achieve 1 wk  -HC      Transfer Training OT LTG, Activity Type bed to chair /chair to bed;toilet;sit to stand/stand to sit  -HC      Transfer Training OT LTG, [place] Level supervision required  -HC      Coordination OT LTG     Coordination OT LTG, Date Established 12/05/17  -HC      Coordination OT LTG, Time to Achieve 1 wk  -HC      Coordination OT LTG, Activity Type FM task;GM task  -HC      Coordination OT LTG, [place] Level min assist;with verbal cues;setup  -HC      ADL OT LTG    ADL OT LTG, Date Established 12/05/17  -HC      ADL OT LTG, Time to Achieve 1 wk  -HC      ADL OT LTG, Activity Type ADL skills  -HC      ADL OT LTG, [place] Level min assist;contact guard  -HC      Endurance OT LTG    Endurance Goal OT LTG, Date Established 12/05/17  -HC      Endurance Goal OT LTG, Time to Achieve 1 wk  -HC      Endurance Goal OT LTG, Activity Level endurance 2 fair+  -HC        User Key  (r) = Recorded By, (t) = Taken By, (c) = Cosigned By    Initials Name Provider Type    HC Vida Mcgarry OTR Occupational Therapist          Occupational Therapy Education     Title: PT OT SLP Therapies (Resolved)     Topic: Occupational Therapy (Resolved)     Point: ADL training (Resolved)    Description: Instruct learner(s) on proper safety adaptation and remediation techniques during self care or transfers.   Instruct in proper use of assistive devices.    Learning Progress Summary    Learner Readiness Method Response Comment Documented by Status   Patient Lindaer E,PATRIZIA,RICKY WESLEY ed pt on written HEP for [facility]. pt demo two of them. ed pt on safety w. bathing/dressing. pt demo bathing/dressing at sink seated and standing w SBA. min A for UBD to ahsley shirt due to sleeves inside out.  12/13/17 1236 Done    Eager E NR   12/05/17 1222 Active               Point: Precautions (Resolved)    Description: Instruct learner(s) on prescribed precautions during self-care and functional transfers.    Learning Progress Summary    Learner Readiness Method Response Comment Documented by Status   Patient Lindaer E,PATRIZIA,RICKY WESLEY ed pt on written HEP for [facility]. pt demo two of them. ed pt on safety w. bathing/dressing. pt demo bathing/dressing at sink seated and standing w  SBA. min A for UBD to [place] shirt due to sleeves inside out.  12/13/17 1236 Done    Kirby E NR   12/05/17 1222 Active               Point: Body mechanics (Resolved)    Description: Instruct learner(s) on proper positioning and spine alignment during self-care, functional mobility activities and/or exercises.    Learning Progress Summary    Learner Readiness Method Response Comment Documented by Status   Patient Lindapeggy SHAH,TB,D VU,RICKY ed pt on written HEP for [facility]. pt demo two of them. ed pt on safety w. bathing/dressing. pt demo bathing/dressing at sink seated and standing w SBA. min A for UBD to [place] shirt due to sleeves inside out.  12/13/17 1236 Done    Kirby SHAH NR   12/05/17 1222 Active                      User Key     Initials Effective Dates Name Provider Type Discipline     04/13/15 -  Brittanie Franco, OTR Occupational Therapist OT     08/17/17 -  Vida Mcgarry, OTR Occupational Therapist OT                  OT Recommendation and Plan  Anticipated Discharge Disposition: inpatient rehabilitation facility  Planned Therapy Interventions: activity intolerance, ADL retraining, IADL retraining, balance training, adaptive equipment training, bed mobility training, transfer training, stretching, strengthening, ROM (Range of Motion)  Therapy Frequency: 3-5 times/wk           Outcome Measures       12/13/17 1237 12/12/17 1400 12/12/17 0800    How much help from another person do you currently need...    Turning from your back to your side while in flat bed without using bedrails?   4  -RW    Moving from lying on back to sitting on the side of a flat bed without bedrails?   3  -RW    Moving to and from a bed to a chair (including a wheelchair)?   3  -RW    Standing up from a chair using your arms (e.g., wheelchair, bedside chair)?   3  -RW    Climbing 3-5 steps with a railing?   3  -RW    To walk in hospital room?   3  -RW    AM-PAC 6 Clicks Score   19  -RW    How much help from another is currently  needed...    Putting on and taking off regular lower body clothing? 3  -KP 3  -HC     Bathing (including washing, rinsing, and drying) 3  -KP 3  -HC     Toileting (which includes using toilet bed pan or urinal) 3  -KP 3  -HC     Putting on and taking off regular upper body clothing 3  -KP 3  -HC     Taking care of personal grooming (such as brushing teeth) 3  -KP 3  -HC     Eating meals 3  -KP 3  -HC     Score 18  -KP 18  -HC     Functional Assessment    Outcome Measure Options AM-PAC 6 Clicks Daily Activity (OT)  -KP AM-PAC 6 Clicks Daily Activity (OT)  -HC AM-PAC 6 Clicks Basic Mobility (PT)  -RW      12/11/17 1412 12/11/17 1400 12/11/17 1300    How much help from another person do you currently need...    Turning from your back to your side while in flat bed without using bedrails?   3  -RW    Moving from lying on back to sitting on the side of a flat bed without bedrails?   3  -RW    Moving to and from a bed to a chair (including a wheelchair)?   3  -RW    Standing up from a chair using your arms (e.g., wheelchair, bedside chair)?   3  -RW    Climbing 3-5 steps with a railing?   3  -RW    To walk in hospital room?   3  -RW    AM-PAC 6 Clicks Score   18  -RW    How much help from another is currently needed...    Putting on and taking off regular lower body clothing? 3  -LE      Bathing (including washing, rinsing, and drying) 3  -LE      Toileting (which includes using toilet bed pan or urinal) 3  -LE      Putting on and taking off regular upper body clothing 3  -LE      Taking care of personal grooming (such as brushing teeth) 3  -LE      Eating meals 3  -LE      Score 18  -LE      Functional Assessment    Outcome Measure Options  AM-PAC 6 Clicks Daily Activity (OT)  -LE AM-PAC 6 Clicks Basic Mobility (PT)  -RW      User Key  (r) = Recorded By, (t) = Taken By, (c) = Cosigned By    Initials Name Provider Type    RAYMOND Valentin, OTR Occupational Therapist    ANETA Franco OTR Occupational Therapist     RW Laura Arteaga, PTA Physical Therapy Assistant    HC Vida Mcgarry, OTR Occupational Therapist           Time Calculation:         Time Calculation- OT       12/13/17 1238          Time Calculation- OT    OT Start Time 0922  -      OT Stop Time 0946  -      OT Time Calculation (min) 24 min  -      OT Received On 12/13/17  -      OT - Next Appointment 12/14/17  -        User Key  (r) = Recorded By, (t) = Taken By, (c) = Cosigned By    Initials Name Provider Type     Brittanie Franco, OTR Occupational Therapist           Therapy Charges for Today     Code Description Service Date Service Provider Modifiers Qty    [id number]  OT SELF CARE/MGMT/TRAIN EA 15 MIN 12/13/2017 Brittanie Franco OTR GO 2               Brittanie Franco OTR  12/13/2017

## 2017-12-13 NOTE — PROGRESS NOTES
Case Management Discharge Note    Final Note: Home w/ [facility]    Discharge Placement     Facility/Agency Request Status Selected? Address Phone Number Fax Number    [facility] Accepted    Yes [address] [place] [address] [place] [id number] [phone]    [facility]     [address] [facility] [facility] [address] [phone]    [facility] Declined   no beds     [address] [address] [phone]    [facility] Declined   [facility] beds     [address] [facility] [address] [phone]    [facility] Declined     [address] [facility] [address] [phone]        Other: Other    Discharge Codes: 06  Discharged/transferred to home under care of organized home health service organization in anticipation of skilled care

## 2017-12-13 NOTE — NURSING NOTE
S/W BEV Rodriguez with [facility] and they will follow patient for SN and draw PT/INR's.  Janet Pinedo RN

## 2017-12-13 NOTE — PLAN OF CARE
Problem: Patient Care Overview (Adult)  Goal: Plan of Care Review  Outcome: Ongoing (interventions implemented as appropriate)    12/13/17 0915   Coping/Psychosocial Response Interventions   Plan Of Care Reviewed With patient   Outcome Evaluation   Outcome Summary/Follow up Plan VFSS completed. Recommend regular, no mixed consistencies, with thin liquids. Meds whole with think or puree. Recommend continued ST at next level of care for f/u with dysphagia and cogntitive-linguistic function.          Problem: Inpatient SLP  Goal: Dysphagia- Patient will safely consume diet as per recommendation with no signs/symptoms of aspiration  Outcome: Ongoing (interventions implemented as appropriate)    12/13/17 0915   Safely Consume Diet   Safely Consume Diet- SLP, Time to Achieve other (see comments)  (2-4 weeks, repeat VFSS.)   Safely Consume Diet- SLP, Outcome goal ongoing

## 2017-12-13 NOTE — PROGRESS NOTES
[address]    12/12/2017    Patient ID:  Name:  Angelita Brambila  MRN:  [id number]  1965  52 y.o.  female            CC/Reason for visit: Follow-up for stroke    Interval:  No acute events.  Working with pt  Entertaining family in room   Doing puzzle    Vitals:  Vitals:    12/12/17 0513 12/12/17 0913 12/12/17 1357 12/12/17 1729   BP: 141/94 122/87 125/87 134/89   BP Location: Right arm Right arm Right arm Right arm   Patient Position: Lying Lying Lying Lying   Pulse: 84 79 74 70   Resp:  18 18 18   Temp: 98.5 °F (36.9 °C) 97.8 °F (36.6 °C) 98.4 °F (36.9 °C) 98.2 °F (36.8 °C)   TempSrc: Oral Oral Oral Oral   SpO2: 98% 96% 98% 97%   Weight:       Height:               Body mass index is 24.3 kg/(m^2).    Intake/Output Summary (Last 24 hours) at 12/12/17 1904  Last data filed at 12/12/17 0513   Gross per 24 hour   Intake             1170 ml   Output                0 ml   Net             1170 ml       Exam:  GEN:  Awake, alert, has some movement against gravity in the left upper extremity.  Left lower extremity is much stronger.  Has participated with physical therapy today.  Lungs: Clear bilaterally, nonlabored breathing  CV:  Normal S1S2, without murmur, no edema  ABD:  Non tender, no enlarged liver or masses  EXT:  No cyanosis or clubbing      Scheduled meds:      amLODIPine 10 mg Oral Daily   aspirin 81 mg Oral Daily   atorvastatin 80 mg Oral Nightly   folic acid 1 mg Oral Daily   nicotine 1 patch Transdermal Q24H   pantoprazole 40 mg Oral Daily   polyethylene glycol 17 g Oral Daily   QUEtiapine 25 mg Oral Once   rOPINIRole 1 mg Oral BID   thiamine 200 mg Oral Daily   warfarin 5 mg Oral Daily     IV meds:                          sodium chloride 75 mL/hr Last Rate: 75 mL/hr (12/12/17 0513)       Data Review:   I reviewed the patient's medications and new clinical results.  Lab Results   Component Value Date    CALCIUM 9.2 12/06/2017    PHOS 3.9 12/06/2017    MG 2.4 12/06/2017    MG  "2.2 12/05/2017    MG 2.4 12/04/2017       Results from last 7 days  Lab Units 12/12/17  0459 12/11/17  0447 12/10/17  0913 12/10/17  0443  12/06/17  0444 12/05/17  2151   SODIUM mmol/L  --   --   --   --   --  142  --    POTASSIUM mmol/L  --   --   --   --   --  3.7 3.3*   CHLORIDE mmol/L  --   --   --   --   --  103  --    CO2 mmol/L  --   --   --   --   --  22.7  --    BUN mg/dL  --   --   --   --   --  5*  --    CREATININE mg/dL  --   --   --   --   --  0.57  --    CALCIUM mg/dL  --   --   --   --   --  9.2  --    BILIRUBIN mg/dL  --   --   --   --   --  0.6  --    ALK PHOS U/L  --   --   --   --   --  92  --    ALT (SGPT) U/L  --   --   --   --   --  31  --    AST (SGOT) U/L  --   --   --   --   --  61*  --    GLUCOSE mg/dL  --   --   --   --   --  90  --    WBC 10*3/mm3 7.17 8.10  --  7.26  < > 5.94  --    HEMOGLOBIN g/dL 12.0 12.0  --  12.3  < > 13.6  --    PLATELETS 10*3/mm3 406 416  --  411  < > 340  --    INR  2.69* 1.56* 1.13*  --   < >  --   --    PROCALCITONIN ng/mL  --   --   --   --   --  0.06*  --    < > = values in this interval not displayed.    ASSESSMENT:   Acute alcohol withdrawal  CVA (cerebral vascular accident)  Oropharyngeal dysphagia, on modified diet  Left hemiparesis  Hypertension  Hemorrhagic transformation of CVA  Compression of brain      PLAN:  Continue PT   Continue coumadin, INR is 2.69    Neuro interventional group states the following: \"Based on imaging visualization of thrombus remaining we recommend unfractionated heparin or Lovenox; preferably heparin till INR 2-3 with bridge to coumadin;Carotid US showed no significant stenosis in both right and left carotid artery.\"    Home in AM after swallow evaluation      Court Rapp MD  12/12/2017  "

## 2017-12-13 NOTE — DISCHARGE SUMMARY
DISCHARGE SUMMARY    Angelita Brambila  1965  female  52 y.o.    Date of Admission: 12/1/2017  Date of Discharge:  12/13/2017    PCP: Ivonne Bradley MD    Discharge Diagnosis:  Acute alcohol withdrawal  CVA (cerebral vascular accident)  Oropharyngeal dysphagia, improved  Left hemiparesis  Hypertension  Hemorrhagic transformation of CVA  Compression of brain      Hospital Course (brief)  This is a 52-year-old female who has a history of tobacco or alcohol abuse presented to emergency room with a history of having left-sided weakness.  She was given TPA by neurology and she was admitted to ICU for further care.  She has a small hemorrhagic transformation.  She also underwent a corrected as ultrasound which showed no significant stenosis.  After initial stabilization patient was started on heparin as suggested by neurosurgery and later was converted to Coumadin.  The heparin has been discontinued and she is on Coumadin at 5 mg but will be reduced to 4 mg as her INR is 3.6.  He also has significant dysphagia and she worked with the speech therapy and she has significantly improved.  Now her diet has been changed to regular diet.  She also has a history of alcohol abuse in the past and was given thiamine and folic acid.  She still has weakness on the left side and will be discharged on Coumadin at 4 mg along with other medications including hypertensives and thiamine and folic acid.  She has a follow-up with neurosurgery in 2 weeks.  She overall also follow-up with her primary care physician in about 1 month      Consults:  Neurology  Neurosurgery  Speech therapy  PT and OT    Pertinent Test Results:    Results from last 7 days  Lab Units 12/13/17  0739 12/12/17 0459 12/11/17 0447   WBC 10*3/mm3 7.43 7.17 8.10   HEMOGLOBIN g/dL 12.9 12.0 12.0   HEMATOCRIT % 39.3 35.8 36.7   PLATELETS 10*3/mm3 492 406 416           Results from last 7 days  Lab Units 12/13/17  0739 12/12/17 0459 12/11/17 0447   INR  3.43* 2.69*  1.56*       Condition on Discharge:   Stable.    Vital Signs past 24hrs  BP range: BP: (119-138)/(78-90) 128/78  Pulse range: Heart Rate:  [70-91] 84  Resp rate range: Resp:  [18] 18  Temp range: Temp (24hrs), Av.1 °F (36.7 °C), Min:97.7 °F (36.5 °C), Max:98.4 °F (36.9 °C)    O2 Device: room air   Oxygen range:SpO2:  [97 %-100 %] 99 %   66.2 kg (146 lb); Body mass index is 24.3 kg/(m^2).  No intake or output data in the 24 hours ending 17 1311    Physical Exam   Constitutional: She is oriented to person, place, and time. She appears well-developed and well-nourished. No distress.   HENT:   Head: Normocephalic and atraumatic.   Eyes: Pupils are equal, round, and reactive to light. No scleral icterus.   Cardiovascular: Normal rate and regular rhythm.    Pulmonary/Chest: Effort normal. No respiratory distress. She has no decreased breath sounds. She has no wheezes.   Abdominal: Soft. Bowel sounds are normal. There is no hepatosplenomegaly.   Neurological: She is alert and oriented to person, place, and time.   Weakness on the left side   Skin: No cyanosis. Nails show no clubbing.   Psychiatric: She has a normal mood and affect. Her behavior is normal.       Discharge Disposition  Home or Self Care    Discharge Medications   Angelita Brambila   Home Medication Instructions GIRISH:[id number]    Printed on:17 1311   Medication Information                      amLODIPine (NORVASC) 5 MG tablet  Take 5 mg by mouth Daily.             atorvastatin (LIPITOR) 80 MG tablet  Take 1 tablet by mouth Every Night.             fluticasone (FLONASE) 50 MCG/ACT nasal spray  2 sprays into each nostril once daily.             folic acid (FOLVITE) 1 MG tablet  Take 1 tablet by mouth Daily.             nicotine (NICODERM CQ) 21 MG/24HR patch  Place 1 patch on the skin Daily.             pantoprazole (PROTONIX) 40 MG EC tablet  Take 1 tablet by mouth daily.             QUEtiapine (SEROquel) 25 MG tablet  Take 1 tablet by mouth  Every Night.             rOPINIRole (REQUIP) 1 MG tablet  Take 1 mg by mouth 2 (Two) Times a Day As Needed. Take 1 hour before bedtime.             thiamine (VITAMIN B1) 100 MG tablet  Take 1 tablet by mouth Daily.             traZODone (DESYREL) 50 MG tablet  Take 1 tablet by mouth every night at bedtime.             venlafaxine XR (EFFEXOR-XR) 75 MG 24 hr capsule  Take 225 mg by mouth Daily.             warfarin (COUMADIN) 4 MG tablet  Take 1 tablet by mouth Daily.                 Discharge Diet:  Diet Order   Procedures   • Diet Regular; Thin; GI Soft / Tripp       Activity at Discharge:  As tolerated     Follow-up Appointments  Follow-up Information     Follow up with Sanju Monson MD Follow up in 2 week(s).    Specialties:  Neurosurgery, Radiology    Why:  with repeat carotid dopplar    Contact information:    [address]  [facility] [address]  [facility] [id number] [phone]          Follow up with [facility] [place] .    Specialty:  Home Health Services    Contact information:    [address] [address] [phone]        Follow up with [facility] [place] AND [place] .    Specialty:  Home Health Services    Contact information:    [address] [address]          Follow up with Ivonne Bradley MD Follow up in 1 month(s).    Specialty:  Family Medicine    Contact information:    [address] [facility] [address] [phone]          Additional Instructions for the Follow-ups that You Need to Schedule     Ambulatory Referral to Home Health    As directed    Face to Face Visit Date:  12/13/2017    Follow-up Provider for Plan of Care?:  I treated the patient in an acute care facility and will not continue treatment after discharge.    Follow-up Provider:  IVORY STEPHENSON IV [[id number]]    Reason/Clinical Findings:  CVA    Describe mobility limitations that make leaving home difficult:   Stroke    Nursing/Therapeutic Services Requested:  Medical / Social Work (INR)    Frequency:  Other (2 times a week)                         Court Rapp MD, FCCP  Pulmonary, Critical Care and Sleep Medicine.  [facility]    12/13/2017 ,    Time: I spent more than 30 min in the discharge planning of this patient.    Much of this encounter note is an electronic transcription/translation of spoken language to printed text. The electronic translation of spoken language may permit erroneous, or at times, nonsensical words or phrases to be inadvertently transcribed; Although I have reviewed the note for such errors, some may still exist.

## 2017-12-19 ENCOUNTER — HOSPITAL ENCOUNTER (OUTPATIENT)
Dept: CARDIOLOGY | Facility: HOSPITAL | Age: 52
Discharge: HOME | End: 2017-12-19
Attending: NEUROLOGICAL SURGERY | Admitting: NEUROLOGICAL SURGERY

## 2017-12-19 DIAGNOSIS — I65.23 CAROTID STENOSIS, BILATERAL: ICD-10-CM

## 2017-12-19 LAB
BH CV XLRA MEAS LEFT CCA RATIO VEL: -69.1 CM/SEC
BH CV XLRA MEAS LEFT DIST CCA EDV: -22.4 CM/SEC
BH CV XLRA MEAS LEFT DIST CCA PSV: -69.1 CM/SEC
BH CV XLRA MEAS LEFT DIST ICA EDV: -30.1 CM/SEC
BH CV XLRA MEAS LEFT DIST ICA PSV: -65.5 CM/SEC
BH CV XLRA MEAS LEFT ICA RATIO VEL: 71.9 CM/SEC
BH CV XLRA MEAS LEFT ICA/CCA RATIO: -1
BH CV XLRA MEAS LEFT MID ICA EDV: -31.4 CM/SEC
BH CV XLRA MEAS LEFT MID ICA PSV: -69.9 CM/SEC
BH CV XLRA MEAS LEFT PROX CCA EDV: 20 CM/SEC
BH CV XLRA MEAS LEFT PROX CCA PSV: 68.8 CM/SEC
BH CV XLRA MEAS LEFT PROX ECA EDV: -13.4 CM/SEC
BH CV XLRA MEAS LEFT PROX ECA PSV: -60.5 CM/SEC
BH CV XLRA MEAS LEFT PROX ICA EDV: 24.4 CM/SEC
BH CV XLRA MEAS LEFT PROX ICA PSV: 71.9 CM/SEC
BH CV XLRA MEAS LEFT PROX SCLA PSV: 115 CM/SEC
BH CV XLRA MEAS LEFT VERTEBRAL A EDV: 9 CM/SEC
BH CV XLRA MEAS LEFT VERTEBRAL A PSV: 27.5 CM/SEC
BH CV XLRA MEAS RIGHT CCA RATIO VEL: -68.4 CM/SEC
BH CV XLRA MEAS RIGHT DIST CCA EDV: -21.2 CM/SEC
BH CV XLRA MEAS RIGHT DIST CCA PSV: -68.4 CM/SEC
BH CV XLRA MEAS RIGHT DIST ICA EDV: -24.4 CM/SEC
BH CV XLRA MEAS RIGHT DIST ICA PSV: -58.9 CM/SEC
BH CV XLRA MEAS RIGHT ICA RATIO VEL: -58.9 CM/SEC
BH CV XLRA MEAS RIGHT ICA/CCA RATIO: 0.86
BH CV XLRA MEAS RIGHT MID ICA EDV: -16.5 CM/SEC
BH CV XLRA MEAS RIGHT MID ICA PSV: -50.7 CM/SEC
BH CV XLRA MEAS RIGHT PROX CCA EDV: -19.6 CM/SEC
BH CV XLRA MEAS RIGHT PROX CCA PSV: -76.2 CM/SEC
BH CV XLRA MEAS RIGHT PROX ECA EDV: -12.6 CM/SEC
BH CV XLRA MEAS RIGHT PROX ECA PSV: -55 CM/SEC
BH CV XLRA MEAS RIGHT PROX ICA EDV: -17.3 CM/SEC
BH CV XLRA MEAS RIGHT PROX ICA PSV: -52.3 CM/SEC
BH CV XLRA MEAS RIGHT PROX SCLA PSV: 114 CM/SEC
BH CV XLRA MEAS RIGHT VERTEBRAL A EDV: -14 CM/SEC
BH CV XLRA MEAS RIGHT VERTEBRAL A PSV: -35.5 CM/SEC
RIGHT ARM BP: NORMAL MMHG

## 2017-12-19 PROCEDURE — 93880 EXTRACRANIAL BILAT STUDY: CPT

## 2017-12-20 ENCOUNTER — HOSPITAL ENCOUNTER (OUTPATIENT)
Dept: CT IMAGING | Facility: HOSPITAL | Age: 52
Discharge: HOME | End: 2017-12-20
Admitting: NURSE PRACTITIONER

## 2017-12-20 DIAGNOSIS — I63.9 CEREBROVASCULAR ACCIDENT (CVA), UNSPECIFIED MECHANISM (HCC): ICD-10-CM

## 2017-12-20 LAB — CREAT BLDA-MCNC: 0.8 MG/DL (ref 0.6–1.3)

## 2017-12-20 PROCEDURE — 70496 CT ANGIOGRAPHY HEAD: CPT

## 2017-12-20 PROCEDURE — 0 IOPAMIDOL 61 % SOLUTION: Performed by: NURSE PRACTITIONER

## 2017-12-20 PROCEDURE — 82565 ASSAY OF CREATININE: CPT

## 2017-12-20 PROCEDURE — 70498 CT ANGIOGRAPHY NECK: CPT

## 2017-12-20 RX ADMIN — IOPAMIDOL 85 ML: 612 INJECTION, SOLUTION INTRAVENOUS at 20:15

## 2017-12-26 ENCOUNTER — OFFICE VISIT (OUTPATIENT)
Dept: NEUROSURGERY | Facility: CLINIC | Age: 52
End: 2017-12-26

## 2017-12-26 VITALS
HEIGHT: 65 IN | DIASTOLIC BLOOD PRESSURE: 62 MMHG | SYSTOLIC BLOOD PRESSURE: 100 MMHG | RESPIRATION RATE: 16 BRPM | HEART RATE: 88 BPM | WEIGHT: 146 LBS | BODY MASS INDEX: 24.32 KG/M2

## 2017-12-26 DIAGNOSIS — I63.9 CEREBROVASCULAR ACCIDENT (CVA), UNSPECIFIED MECHANISM (HCC): Primary | ICD-10-CM

## 2017-12-26 PROCEDURE — 99204 OFFICE O/P NEW MOD 45 MIN: CPT | Performed by: NEUROLOGICAL SURGERY

## 2017-12-26 NOTE — PROGRESS NOTES
Subjective   Patient ID: Angelita Brambila is a 52 y.o. female is here today for follow-up of bilateral carotid stenosis and CVA 12-1-17 with administration of tPA. She has undergone repeat carotid doppler and CTA  and is accompanied by her daughter-in-law, Shreya .    History of Present Illness     The patient is a 52-year-old right-handed woman who presents at this time now approximately 3-1/2 weeks following admission to hospital with a right hemispheric stroke.  She had had about 4 weeks of malaise.  She was undergoing some problems with her gallbladder.  She came to the hospital after about 24 hours of a headache.  She was noted to be weak on the left side an NIH stroke scale initially of 10.  She was thought to be a candidate for IV TPA and was given IV TPA with a rapid improvement in her neurologic status.  CTA demonstrated a floating clot in the right internal carotid artery and she was anticoagulated.  She presents for follow-up in the office today close to 30 days following her event for both an NIH stroke scale and for recommendations with regard to carotid artery stenosis management.    She and her daughter-in-law indicates that she has essentially returned to baseline.  She feels a bit slower than she did before..  She is gradually resuming normal activities at home and is doing more cooking.  She does laundry.  She denies significant weakness numbness tingling speech problems thinking problems etc.  She denies any visual field problems.  Has noted occasionally difficulty with finding appropriate words but she takes her time is able to find the appropriate word.  She does note a little left facial weakness that has impeded her ability to chew and swallow however this is much improved and doesn't bother her very much at this time.    The following portions of the patient's history were reviewed and updated as appropriate: allergies, current medications, past family history, past medical history, past social  history, past surgical history and problem list.    Review of Systems   Constitutional: Positive for fatigue. Negative for unexpected weight change.   HENT: Positive for hearing loss.    Eyes: Negative for visual disturbance.   Respiratory: Negative for apnea and chest tightness.    Cardiovascular: Negative for chest pain.   Gastrointestinal: Negative for abdominal distention and abdominal pain.   Endocrine: Negative for cold intolerance, heat intolerance and polyphagia.   Genitourinary: Negative for difficulty urinating.   Musculoskeletal: Negative for gait problem and myalgias.   Neurological: Positive for speech difficulty (some speech hesitancy). Negative for dizziness, light-headedness and headaches.   Psychiatric/Behavioral: Negative for agitation, behavioral problems, confusion, decreased concentration, hallucinations, self-injury and sleep disturbance.       Objective   Physical Exam   Constitutional: She is oriented to person, place, and time. She appears well-developed and well-nourished. She is cooperative.   HENT:   Head: Normocephalic and atraumatic.   Eyes: EOM are normal. Pupils are equal, round, and reactive to light. No scleral icterus.   Neck: Normal range of motion. Neck supple.   Cardiovascular: Normal rate, regular rhythm and intact distal pulses.    No murmur heard.  Pulmonary/Chest: Effort normal and breath sounds normal.   Abdominal: Soft. Bowel sounds are normal. There is no tenderness.   Musculoskeletal: Normal range of motion.   Neurological: She is alert and oriented to person, place, and time. She has normal strength. She displays no atrophy. No cranial nerve deficit or sensory deficit. She exhibits normal muscle tone. She has a normal Finger-Nose-Finger Test, a normal Heel to Shin Test, a normal Romberg Test and a normal Tandem Gait Test. She displays a negative Romberg sign. Gait normal. Coordination and gait normal. GCS eye subscore is 4. GCS verbal subscore is 5. GCS motor subscore  is 6.   Reflex Scores:       Tricep reflexes are 2+ on the right side and 2+ on the left side.       Bicep reflexes are 2+ on the right side and 2+ on the left side.       Brachioradialis reflexes are 2+ on the right side and 2+ on the left side.       Patellar reflexes are 2+ on the right side and 2+ on the left side.       Achilles reflexes are 2+ on the right side and 2+ on the left side.  Negative Gomez and clonus  Finger to nose intact   Heel to shin intact  Able to tandem walk  Able to walk on heels and toes   Skin: Skin is warm, dry and intact.   Psychiatric: She has a normal mood and affect. Her speech is normal and behavior is normal. Judgment and thought content normal. Cognition and memory are normal.   Vitals reviewed.    Neurologic Exam     Mental Status   Oriented to person, place, and time.   Registration: recalls 3 of 3 objects. Recall at 5 minutes: recalls 3 of 3 objects. Follows 3 step commands.   Attention: normal. Concentration: normal.   Speech: speech is normal   Level of consciousness: alert  Knowledge: good.   Able to name object. Able to read. Able to repeat. Able to write. Normal comprehension.     Cranial Nerves     CN II   Visual fields full to confrontation.     CN III, IV, VI   Pupils are equal, round, and reactive to light.  Extraocular motions are normal.   Right pupil: Consensual response: intact.   Left pupil: Consensual response: intact.   CN III: no CN III palsy  CN VI: no CN VI palsy  Nystagmus: none   Diplopia: none  Ophthalmoparesis: none  Upgaze: normal  Downgaze: normal  Conjugate gaze: present  Vestibulo-ocular reflex: present    CN V   Facial sensation intact.     CN VII   Facial expression full, symmetric.   Left facial weakness: central    CN VIII   CN VIII normal.     CN IX, X   CN IX normal.     CN XI   CN XI normal.     CN XII   CN XII normal.     Motor Exam   Muscle bulk: normal  Overall muscle tone: normal  Right arm tone: normal  Left arm tone: normal  Right arm  pronator drift: absent  Left arm pronator drift: absent  Right leg tone: normal  Left leg tone: normal    Strength   Strength 5/5 throughout.   Right neck flexion: 5/5  Left neck flexion: 5/5  Right neck extension: 5/5  Left neck extension: 5/5  Right deltoid: 5/5  Left deltoid: 5/5  Right biceps: 5/5  Left biceps: 5/5  Right triceps: 5/5  Left triceps: 5/5  Right wrist flexion: 5/5  Left wrist flexion: 5/5  Right wrist extension: 5/5  Left wrist extension: 5/5  Right interossei: 5/5  Left interossei: 5/5  Right abdominals: 5/5  Left abdominals: 5/5  Right iliopsoas: 5/5  Left iliopsoas: 5/5  Right quadriceps: 5/5  Left quadriceps: 5/5  Right hamstrin/5  Left hamstrin/5  Right glutei: 5/5  Left glutei: 5/5  Right anterior tibial: 5/5  Left anterior tibial: 5/5  Right posterior tibial: 5/5  Left posterior tibial: 5/5  Right peroneal: 5/5  Left peroneal: 5/5  Right gastroc: 5/5  Left gastroc: 5/5    Sensory Exam   Light touch normal.   Vibration normal.   Proprioception normal.   Pinprick normal.     Gait, Coordination, and Reflexes     Gait  Gait: normal    Coordination   Romberg: negative  Finger to nose coordination: normal  Heel to shin coordination: normal  Tandem walking coordination: normal    Tremor   Resting tremor: absent  Intention tremor: absent  Action tremor: absent    Reflexes   Right brachioradialis: 2+  Left brachioradialis: 2+  Right biceps: 2+  Left biceps: 2+  Right triceps: 2+  Left triceps: 2+  Right patellar: 2+  Left patellar: 2+  Right achilles: 2+  Left achilles: 2+  Right : 2+  Left : 2+  Right plantar: normal  Left plantar: normal  Right Gomez: absent  Left Gomez: absent  Right ankle clonus: absent  Left ankle clonus: absent    NIHSS:    26 days    0-->Alert: keenly responsive  0-->Answers both questions correctly  0-->Performs both tasks correctly  0=normal  0=No visual loss  1=Minor paralysis (flattened nasolabial fold, asymmetric on smiling)  0-->No drift: limb holds  90 (or 45) degrees for full 10 secs  0-->No drift: limb holds 90 (or 45) degrees for full 10 secs  0-->No drift: limb holds 90 (or 45) degrees for full 10 secs  0-->No drift: limb holds 90 (or 45) degrees for full 10 secs  0=Absent  0=Normal; no sensory loss  0=No aphasia, normal  0=Normal  0=No abnormality    Total score:1    Assessment/Plan   Independent Review of Radiographic Studies:      I personally reviewed the carotid duplex as well as the CTA done quite recently.  The CTA demonstrates significant improvement in the previously present right carotid stenosis.  Previously there was a filling defect at the carotid bifurcation on the right side consistent with a floating clot.  This has improved significantly and now there is only 20-30% stenosis of the right internal carotid artery.  There are no intracranial stenosis.  The carotid duplex shows no hemodynamically significant stenosis.    Medical Decision Making:    Likely the patient is doing remarkably well.  She has had near total resolution of motor weakness of the left side.  She has an occasional word finding difficulty.  She has noted some mild facial dysfunction which is continuing to improve.  She has some fatigue which is expected following a stroke.    The real question is what is the etiology of her stroke.  She has altered her lifestyle for the better-she has not been drinking alcohol and neither is she is smoking.  She indicates to me that she does not use illicit drugs.    I'm recommending that she be evaluated by hematology for hypercoagulable workup.  She should remain on anticoagulation until that workup is completed and she has been reevaluated by stroke neurology.    With regard to living independently- is currently living with her niece.  I recommended that she continue to do so over the next 4 weeks at least and that we make an appointment with physical medicine and rehabilitation for formal evaluation as to her capability of returning to  her own home and also to return to driving.    At this time there are no carotid interventions that are necessary.  The stenosis is resolving nicely with anticoagulation.    I've encouraged her not to start smoking or drinking again.  I've of course told her not to use methamphetamines or any other stimulants.    Plan: Physical medicine rehabilitation consultation, hematology consultation, and referral to stroke clinic follow-up in 2 months for a three-month NIH stroke scale and MRSA evaluation.    Angelita was seen today for bilateral carotid stenosis.    Diagnoses and all orders for this visit:    Cerebrovascular accident (CVA), unspecified mechanism  -     Ambulatory Referral to Hematology  -     Ambulatory Referral to Physical Medicine Rehab  -     Ambulatory Referral to Neurology      Return if symptoms worsen or fail to improve.

## 2017-12-29 ENCOUNTER — TELEPHONE (OUTPATIENT)
Dept: NEUROLOGY | Facility: CLINIC | Age: 52
End: 2017-12-29

## 2017-12-29 NOTE — TELEPHONE ENCOUNTER
I called and S/W Ms. Brambila.  I let her know that Dr. Adrian reviewed her recent CTA of the head and neck an it does show decreasing thrombus (blood clot) in the right internal carotid artery.  She should continue the Coumadin for 30 days then she will be switched to ASA and Plavix.  I will check closer to the end of 30 days with Jennifer HENRDIX for an ASA dosage and if she will place the Plavix order.  She also has a follow up appointment with Jennifer Serrato in March.  I reviewed all the upcoming appointments dates and times with her.  PABLO Edmonds RN

## 2017-12-29 NOTE — TELEPHONE ENCOUNTER
----- Message from RUMA Cabrera sent at 12/29/2017  8:37 AM EST -----  Please notify patient that Dr. Adrian reviewed her recent CTA head/neck which shows decreasing thrombus in the right ICA. He recommends that she continue warfarin for a total of 30 days and then switch to ASA and Plavix. She has a follow-up appointment scheduled with me in March. Thanks.

## 2017-12-29 NOTE — TELEPHONE ENCOUNTER
Bekah Rosales called me back.  She has concerns that her mother needs more PT/OT/ST.  She was told by the therapists they were discharging Ms. Brambila.  A [facility] nurse come to draw protimes and I suggested they discuss it with the nurse and her PCP for more therapies.  She was ok with the suggestion.  PABLO Edmonds RN

## 2018-01-04 ENCOUNTER — TELEPHONE (OUTPATIENT)
Dept: NEUROLOGY | Facility: CLINIC | Age: 53
End: 2018-01-04

## 2018-01-04 DIAGNOSIS — I63.9 CEREBROVASCULAR ACCIDENT (CVA), UNSPECIFIED MECHANISM (HCC): Primary | ICD-10-CM

## 2018-01-04 RX ORDER — CLOPIDOGREL BISULFATE 75 MG/1
75 TABLET ORAL DAILY
Qty: 30 TABLET | Refills: 11 | Status: SHIPPED | OUTPATIENT
Start: 2018-01-04 | End: 2018-01-05 | Stop reason: SDUPTHER

## 2018-01-04 RX ORDER — ASPIRIN 81 MG/1
81 TABLET, CHEWABLE ORAL DAILY
Status: DISCONTINUED | OUTPATIENT
Start: 2018-01-04 | End: 2019-01-17

## 2018-01-04 NOTE — TELEPHONE ENCOUNTER
I called and spoke with Bekah Rosales, sister of Angelita Brambila.  She told me Ms. Brambila was sleeping has not felt good all day, achy all over.  I let Ms. Rosales know that she should have Angelita take her Coumadin today, start Plavix 75mg and ASA 81 mg tomorrow and stop the Coumadin tomorrow.  I let Jennifer Serrato know the pharmacy of record changed to [facility], she is going to place the order for the Plavix at this pharmacy to be picked up tomorrow morning.  PABLO Edmonds RN

## 2018-01-04 NOTE — TELEPHONE ENCOUNTER
----- Message from RUMA Cabrera sent at 1/4/2018 12:58 PM EST -----  Can you let her know that I put in the order for Plavix. She needs to stop the warfarin before she starts the ASA 81 mg and Plavix. Thanks.     ----- Message -----     From: Malia Edmonds RN     Sent: 12/29/2017  10:46 AM       To: RUMA Cabrera    What dose of ASA and will you be the one to put in the Plavix order? I know it is 30 days away but she wants to know.

## 2018-01-05 DIAGNOSIS — I63.9 CEREBROVASCULAR ACCIDENT (CVA), UNSPECIFIED MECHANISM (HCC): Primary | ICD-10-CM

## 2018-01-05 RX ORDER — CLOPIDOGREL BISULFATE 75 MG/1
75 TABLET ORAL DAILY
Qty: 30 TABLET | Refills: 11 | Status: SHIPPED | OUTPATIENT
Start: 2018-01-05 | End: 2019-01-05

## 2018-01-24 ENCOUNTER — TELEPHONE (OUTPATIENT)
Dept: NEUROLOGY | Facility: CLINIC | Age: 53
End: 2018-01-24

## 2018-01-24 NOTE — TELEPHONE ENCOUNTER
I called and spoke with Bekah Rosales, niece of Angelita Brambila.  She said her aunt is doing well.  Still has someone stay with her in her house.  She lives in [facility] near [place].  She is isolated and has no easy way to get to a main road.  When she had her stroke she needed to be air lifted from her property.  The main concern for Ms. Brambila is driving.  Her vision was affected by the stroke and she has not been cleared to drive.  Ms. Rosales is hoping when she sees Dr. Webster Jan 31st he will release her to drive.  Bekah was not familiar with her current medications but is taking the same ones she was discharged with.  I asked that she make sure she is taking Lipitor 80 mg at night, Plavix 75 mg daily and ASA 81 mg daily.  She also has a blood pressure medication, Norvasc 5 mg daily. We discussed signs and symptoms of stroke.  Bekah told me they are talking her into wearing a 911 monitor around her neck so if needed she can press emergency immediately.  mRS 1.  PABLO Edmonds RN

## 2018-01-30 ENCOUNTER — CONSULT (OUTPATIENT)
Dept: ONCOLOGY | Facility: CLINIC | Age: 53
End: 2018-01-30

## 2018-01-30 ENCOUNTER — LAB (OUTPATIENT)
Dept: LAB | Facility: HOSPITAL | Age: 53
End: 2018-01-30

## 2018-01-30 VITALS
RESPIRATION RATE: 18 BRPM | SYSTOLIC BLOOD PRESSURE: 128 MMHG | DIASTOLIC BLOOD PRESSURE: 84 MMHG | WEIGHT: 148.8 LBS | HEART RATE: 95 BPM | OXYGEN SATURATION: 98 % | BODY MASS INDEX: 25.4 KG/M2 | TEMPERATURE: 98.1 F | HEIGHT: 64 IN

## 2018-01-30 DIAGNOSIS — I63.9 CEREBROVASCULAR ACCIDENT (CVA), UNSPECIFIED MECHANISM (HCC): Primary | ICD-10-CM

## 2018-01-30 DIAGNOSIS — I63.019 CEREBROVASCULAR ACCIDENT (CVA) DUE TO THROMBOSIS OF VERTEBRAL ARTERY, UNSPECIFIED BLOOD VESSEL LATERALITY (HCC): Primary | ICD-10-CM

## 2018-01-30 LAB
BASOPHILS # BLD AUTO: 0.03 10*3/MM3 (ref 0–0.1)
BASOPHILS NFR BLD AUTO: 0.3 % (ref 0–1.1)
DEPRECATED RDW RBC AUTO: 48 FL (ref 37–49)
EOSINOPHIL # BLD AUTO: 0.11 10*3/MM3 (ref 0–0.36)
EOSINOPHIL NFR BLD AUTO: 1.1 % (ref 1–5)
ERYTHROCYTE [DISTWIDTH] IN BLOOD BY AUTOMATED COUNT: 13.2 % (ref 11.7–14.5)
HCT VFR BLD AUTO: 37 % (ref 34–45)
HGB BLD-MCNC: 12.8 G/DL (ref 11.5–14.9)
IMM GRANULOCYTES # BLD: 0.05 10*3/MM3 (ref 0–0.03)
IMM GRANULOCYTES NFR BLD: 0.5 % (ref 0–0.5)
LYMPHOCYTES # BLD AUTO: 1.79 10*3/MM3 (ref 1–3.5)
LYMPHOCYTES NFR BLD AUTO: 18.5 % (ref 20–49)
MCH RBC QN AUTO: 34.2 PG (ref 27–33)
MCHC RBC AUTO-ENTMCNC: 34.6 G/DL (ref 32–35)
MCV RBC AUTO: 98.9 FL (ref 83–97)
MONOCYTES # BLD AUTO: 0.53 10*3/MM3 (ref 0.25–0.8)
MONOCYTES NFR BLD AUTO: 5.5 % (ref 4–12)
NEUTROPHILS # BLD AUTO: 7.15 10*3/MM3 (ref 1.5–7)
NEUTROPHILS NFR BLD AUTO: 74.1 % (ref 39–75)
NRBC BLD MANUAL-RTO: 0 /100 WBC (ref 0–0)
PLATELET # BLD AUTO: 326 10*3/MM3 (ref 150–375)
PMV BLD AUTO: 10.5 FL (ref 8.9–12.1)
RBC # BLD AUTO: 3.74 10*6/MM3 (ref 3.9–5)
WBC NRBC COR # BLD: 9.66 10*3/MM3 (ref 4–10)

## 2018-01-30 PROCEDURE — 85303 CLOT INHIBIT PROT C ACTIVITY: CPT | Performed by: INTERNAL MEDICINE

## 2018-01-30 PROCEDURE — 85025 COMPLETE CBC W/AUTO DIFF WBC: CPT | Performed by: INTERNAL MEDICINE

## 2018-01-30 PROCEDURE — 36415 COLL VENOUS BLD VENIPUNCTURE: CPT | Performed by: INTERNAL MEDICINE

## 2018-01-30 PROCEDURE — 99245 OFF/OP CONSLTJ NEW/EST HI 55: CPT | Performed by: INTERNAL MEDICINE

## 2018-01-30 PROCEDURE — 36416 COLLJ CAPILLARY BLOOD SPEC: CPT | Performed by: INTERNAL MEDICINE

## 2018-01-30 PROCEDURE — 85306 CLOT INHIBIT PROT S FREE: CPT | Performed by: INTERNAL MEDICINE

## 2018-01-30 PROCEDURE — 85300 ANTITHROMBIN III ACTIVITY: CPT | Performed by: INTERNAL MEDICINE

## 2018-01-30 NOTE — PROGRESS NOTES
Subjective .     REASON FOR CONSULTATION:   Stroke.  Opinion on hypercoagulable workup.  Provide an opinion on any further workup or treatment                             REQUESTING PHYSICIAN: Sanju Monson MD  RECORDS OBTAINED:  Records of the patients history including those obtained from the referring provider were reviewed and summarized in detail.    HISTORY OF PRESENT ILLNESS:  The patient is a 52 y.o. year old female  who is here for follow-up with the above-mentioned history.    Admitted 12/1/17-12/13/17 for left-sided weakness.  Found to have a stroke (large acute-subacute right MCA distribution infarct with further areas of hemorrhagic transformation).  Placed on anticoagulation with Coumadin.  History of alcohol abuse.  Dr. Sanju Monson recommended she remain on anticoagulation until reevaluated by neurology as an outpatient and until hypercoagulable labs completed (he referred her to us for hypercoagulable evaluation).  Patient states neurology change Coumadin to aspirin and Plavix early January 2018.    Denies bleeding from any location.  No personal or family history of venous clotting.    Past Medical History:   Diagnosis Date   • Allergic rhinitis    • Ankle swelling    • Anxiety    • Arthritis    • Asthma    • Attention deficit hyperactivity disorder    • Back pain    • Chest pain    • CHF (congestive heart failure)    • COPD (chronic obstructive pulmonary disease)    • Coronary artery disease    • Depression    • Esophageal reflux    • High cholesterol     Reported cholesterol level was high   • History of colonoscopy     Complete colonoscopy   • Hypercholesterolemia    • Hypertension    • IBS (irritable bowel syndrome)    • Insomnia    • Osteopenia    • Palpitations    • Rotator cuff tendonitis    • Seborrheic dermatitis    • Stroke    • Tendinitis, de Quervain's    • Tobacco use    • Vitamin B12 deficiency    • Vitamin D deficiency      Past Surgical History:   Procedure Laterality Date    • COLONOSCOPY     • DIAGNOSTIC LAPAROSCOPY EXPLORATORY LAPAROTOMY     • ELBOW ARTHROPLASTY Right     Elbow surgery   • FRACTURE SURGERY     • HYSTERECTOMY  1999    Including both ovaries   • TONSILLECTOMY     • WRIST SURGERY Left        HEMATOLOGIC/ONCOLOGIC HISTORY:  (History from previous dates can be found in the separate document.)    MEDICATIONS    Current Outpatient Prescriptions:   •  amLODIPine (NORVASC) 5 MG tablet, Take 5 mg by mouth Daily., Disp: , Rfl:   •  atorvastatin (LIPITOR) 80 MG tablet, Take 1 tablet by mouth Every Night., Disp: 30 tablet, Rfl: 2  •  clopidogrel (PLAVIX) 75 MG tablet, Take 1 tablet by mouth Daily., Disp: 30 tablet, Rfl: 11  •  fluticasone (FLONASE) 50 MCG/ACT nasal spray, 2 sprays into each nostril once daily., Disp: , Rfl:   •  folic acid (FOLVITE) 1 MG tablet, Take 1 tablet by mouth Daily., Disp: 30 tablet, Rfl: 1  •  nicotine (NICODERM CQ) 21 MG/24HR patch, Place 1 patch on the skin Daily., Disp: 21 patch, Rfl: 0  •  pantoprazole (PROTONIX) 40 MG EC tablet, Take 1 tablet by mouth daily., Disp: , Rfl:   •  QUEtiapine (SEROquel) 25 MG tablet, Take 1 tablet by mouth Every Night., Disp: 30 tablet, Rfl: 1  •  rOPINIRole (REQUIP) 1 MG tablet, Take 1 mg by mouth 2 (Two) Times a Day As Needed. Take 1 hour before bedtime., Disp: , Rfl:   •  thiamine (VITAMIN B1) 100 MG tablet, Take 1 tablet by mouth Daily., Disp: 30 tablet, Rfl: 1  •  traZODone (DESYREL) 50 MG tablet, Take 1 tablet by mouth every night at bedtime., Disp: , Rfl:   •  venlafaxine XR (EFFEXOR-XR) 75 MG 24 hr capsule, Take 225 mg by mouth Daily., Disp: , Rfl:     Current Facility-Administered Medications:   •  aspirin chewable tablet 81 mg, 81 mg, Oral, Daily, RUMA Cabrera    ALLERGIES:     Allergies   Allergen Reactions   • Sulfa Antibiotics Shortness Of Breath   • Beta Adrenergic Blockers      Other reaction(s): Syncope   • Codeine    • Erythromycin      Other reaction(s): Abdominal pain   • Penicillins  Hives   • Tetracyclines & Related      Other reaction(s): Abdominal pain   • Varenicline      Other reaction(s): Angina       SOCIAL HISTORY:       Social History     Social History   • Marital status:      Spouse name: N/A   • Number of children: N/A   • Years of education: N/A     Occupational History   •  Retired     Social History Main Topics   • Smoking status: Former Smoker     Packs/day: 1.50     Years: 25.00     Quit date: 12/11/2017   • Smokeless tobacco: Never Used      Comment: caffeine use   • Alcohol use 8.4 oz/week     14 Shots of liquor per week      Comment: 2 drinks gin and tonic every night according to daughter   • Drug use: No   • Sexual activity: Not Currently     Other Topics Concern   • Not on file     Social History Narrative         FAMILY HISTORY:  Family History   Problem Relation Age of Onset   • Heart attack Mother    • Heart disease Mother    • Skin cancer Mother    • Lung cancer Father 51   • Heart attack Brother    • Heart disease Brother      CABG; Pacemaker Placement   • Colon cancer Brother 61   • Heart disease Maternal Grandmother    • Anxiety disorder Other      (Symptom)   • Colon cancer Other    • Depression Other        REVIEW OF SYSTEMS:  GENERAL: No change in appetite or weight;   No fevers, chills, sweats.    SKIN: No nonhealing lesions.   No rashes.  HEME/LYMPH: No easy bruising, bleeding.   No swollen nodes.   EYES: No vision changes or diplopia.   ENT: No tinnitus, hearing loss, gum bleeding, epistaxis, hoarseness or dysphagia.   RESPIRATORY: No cough, shortness of breath, hemoptysis or wheezing.   CVS: No chest pain, palpitations, orthopnea, dyspnea on exertion or PND.   GI: No melena or hematochezia.   No abdominal pain.  No nausea, vomiting, constipation, diarrhea  : No lower tract obstructive symptoms, dysuria or hematuria.   MUSCULOSKELETAL: No bone pain.  No joint stiffness.   NEUROLOGICAL: No global weakness, loss of consciousness or seizures.  "  PSYCHIATRIC: No increased nervousness, mood changes or depression.     Objective    Vitals:    01/30/18 1047   BP: 128/84   Pulse: 95   Resp: 18   Temp: 98.1 °F (36.7 °C)   TempSrc: Oral   SpO2: 98%   Weight: 67.5 kg (148 lb 12.8 oz)   Height: 162 cm (63.78\")   PainSc: 0-No pain     Current Status 1/30/2018   ECOG score 0      PHYSICAL EXAM:    GENERAL:  Well-developed, well-nourished in no acute distress.   SKIN:  Warm, dry without rashes, purpura or petechiae.  EYES:  Pupils equal, round and reactive to light.  EOMs intact.  Conjunctivae normal.  EARS:  Hearing intact.  NOSE:  Septum midline.  No excoriations or nasal discharge.  MOUTH:  Tongue is well-papillated; no stomatitis or ulcers.  Lips normal.  THROAT:  Oropharynx without lesions or exudates.  NECK:  Supple with good range of motion; no thyromegaly or masses, no JVD.  LYMPHATICS:  No cervical, supraclavicular, axillary or inguinal adenopathy.  CHEST:  Lungs clear to auscultation. Good airflow.  CARDIAC:  Regular rate and rhythm without murmurs, rubs or gallops. Normal S1,S2.  ABDOMEN:  Soft, nontender with no hepatosplenomegaly or masses.  EXTREMITIES:  No clubbing, cyanosis or edema.  NEUROLOGICAL:  Cranial Nerves II-XII grossly intact.  No focal neurological deficits.  PSYCHIATRIC:  Normal affect and mood.    RECENT LABS:        WBC   Date Value Ref Range Status   01/30/2018 9.66 4.00 - 10.00 10*3/mm3 Final   12/13/2017 7.43 4.50 - 10.70 10*3/mm3 Final   12/12/2017 7.17 4.50 - 10.70 10*3/mm3 Final   12/11/2017 8.10 4.50 - 10.70 10*3/mm3 Final   12/10/2017 7.26 4.50 - 10.70 10*3/mm3 Final   12/09/2017 7.03 4.50 - 10.70 10*3/mm3 Final   12/08/2017 5.84 4.50 - 10.70 10*3/mm3 Final   12/06/2017 5.94 4.50 - 10.70 10*3/mm3 Final   12/04/2017 6.91 4.50 - 10.70 10*3/mm3 Final   12/01/2017 9.02 4.50 - 10.70 10*3/mm3 Final     Hemoglobin   Date Value Ref Range Status   01/30/2018 12.8 11.5 - 14.9 g/dL Final   12/13/2017 12.9 11.9 - 15.5 g/dL Final   12/12/2017 " 12.0 11.9 - 15.5 g/dL Final   12/11/2017 12.0 11.9 - 15.5 g/dL Final   12/10/2017 12.3 11.9 - 15.5 g/dL Final   12/09/2017 13.0 11.9 - 15.5 g/dL Final   12/08/2017 12.5 11.9 - 15.5 g/dL Final   12/06/2017 13.6 11.9 - 15.5 g/dL Final   12/04/2017 12.3 11.9 - 15.5 g/dL Final   12/01/2017 16.4 (H) 11.9 - 15.5 g/dL Final     Platelets   Date Value Ref Range Status   01/30/2018 326 150 - 375 10*3/mm3 Final   12/13/2017 492 140 - 500 10*3/mm3 Final   12/12/2017 406 140 - 500 10*3/mm3 Final   12/11/2017 416 140 - 500 10*3/mm3 Final   12/10/2017 411 140 - 500 10*3/mm3 Final   12/09/2017 407 140 - 500 10*3/mm3 Final   12/08/2017 365 140 - 500 10*3/mm3 Final   12/06/2017 340 140 - 500 10*3/mm3 Final   12/04/2017 259 140 - 500 10*3/mm3 Final   12/01/2017 300 140 - 500 10*3/mm3 Final       Assessment/Plan   Cerebrovascular accident (CVA), unspecified mechanism  - Factor 5 Leiden  - Factor II, DNA Analysis  - Protein C Activity  - Protein S Functional  - Protein S Antigen, Free  - Anticardiolipin Antibody, IgG / M, Qn  - Lupus Anticoagulant Reflex  - Beta-2 Glycoprotein Antibodies  - Antithrombin III      *Right MCA stroke.  Significant smoking history.  Patient states neurology (Dr. Ochoa-?spelling) changed Coumadin to aspirin and Plavix early January 2017.  Check hypercoagulable labs.  I told her it is fairly common to have at least one abnormality on hypercoagulable labs since some of them are fairly common (up to 5% of the population).  Most of the hypercoagulable labs are mainly important regarding venous clotting events.  The most important hypercoagulable labs for stroke would be the antiphospholipid antibodies.    Plan  Hypercoagulable labs  See me in 2-3 weeks    Daughter assisted with history.

## 2018-01-31 LAB
CARDIOLIPIN IGG SER IA-ACNC: <9 GPL U/ML (ref 0–14)
CARDIOLIPIN IGM SER IA-ACNC: 9 MPL U/ML (ref 0–12)
LA NT PLATELET PPP: 37.8 SEC (ref 0–51.9)
LUPUS ANTICOAGULANT REFLEX: NORMAL
SCREEN DRVVT: 35.8 SEC (ref 0–47)

## 2018-02-01 LAB
B2 GLYCOPROT1 IGA SER-ACNC: <9 GPI IGA UNITS (ref 0–25)
B2 GLYCOPROT1 IGG SER-ACNC: <9 GPI IGG UNITS (ref 0–20)
B2 GLYCOPROT1 IGM SER-ACNC: <9 GPI IGM UNITS (ref 0–32)

## 2018-02-02 LAB
AT III PPP CHRO-ACNC: 111 % (ref 90–134)
PROT C PPP-ACNC: 143 % (ref 86–163)
PROT S ACT/NOR PPP: 90 % (ref 70–127)
PROT S FREE PPP-ACNC: 87 % (ref 49–138)

## 2018-02-05 LAB
F2 GENE MUT ANL BLD/T: NORMAL
F5 GENE MUT ANL BLD/T: NORMAL
FACTOR V COMMENT: NORMAL
Lab: NORMAL

## 2018-02-20 ENCOUNTER — OFFICE VISIT (OUTPATIENT)
Dept: ONCOLOGY | Facility: CLINIC | Age: 53
End: 2018-02-20

## 2018-02-20 ENCOUNTER — APPOINTMENT (OUTPATIENT)
Dept: LAB | Facility: HOSPITAL | Age: 53
End: 2018-02-20

## 2018-02-20 ENCOUNTER — APPOINTMENT (OUTPATIENT)
Dept: ONCOLOGY | Facility: CLINIC | Age: 53
End: 2018-02-20

## 2018-02-20 VITALS
HEIGHT: 64 IN | TEMPERATURE: 98.7 F | BODY MASS INDEX: 25 KG/M2 | DIASTOLIC BLOOD PRESSURE: 70 MMHG | RESPIRATION RATE: 16 BRPM | SYSTOLIC BLOOD PRESSURE: 112 MMHG | HEART RATE: 92 BPM | WEIGHT: 146.4 LBS

## 2018-02-20 DIAGNOSIS — I63.9 CEREBROVASCULAR ACCIDENT (CVA), UNSPECIFIED MECHANISM (HCC): Primary | ICD-10-CM

## 2018-02-20 PROCEDURE — 99212 OFFICE O/P EST SF 10 MIN: CPT | Performed by: INTERNAL MEDICINE

## 2018-02-20 PROCEDURE — G0463 HOSPITAL OUTPT CLINIC VISIT: HCPCS | Performed by: INTERNAL MEDICINE

## 2018-02-20 RX ORDER — CYCLOBENZAPRINE HCL 10 MG
10 TABLET ORAL 2 TIMES DAILY PRN
COMMUNITY
Start: 2018-02-06

## 2018-02-20 NOTE — PROGRESS NOTES
Subjective .     REASON FOR FOLLOWUP :  Stroke    HISTORY OF PRESENT ILLNESS:  The patient is a 52 y.o. year old female  who is here for follow-up with the above-mentioned history.    Denies chest pain, shortness of breath, pain or swelling in 1 leg more than the other.  Denies bleeding from any location.      Past Medical History:   Diagnosis Date   • Allergic rhinitis    • Ankle swelling    • Anxiety    • Arthritis    • Asthma    • Attention deficit hyperactivity disorder    • Back pain    • Chest pain    • CHF (congestive heart failure)    • COPD (chronic obstructive pulmonary disease)    • Coronary artery disease    • Depression    • Esophageal reflux    • High cholesterol     Reported cholesterol level was high   • History of colonoscopy     Complete colonoscopy   • Hypercholesterolemia    • Hypertension    • IBS (irritable bowel syndrome)    • Insomnia    • Osteopenia    • Palpitations    • Rotator cuff tendonitis    • Seborrheic dermatitis    • Stroke 2017   • Tendinitis, de Quervain's    • Tobacco use    • Vitamin B12 deficiency    • Vitamin D deficiency      Past Surgical History:   Procedure Laterality Date   • COLONOSCOPY     • DIAGNOSTIC LAPAROSCOPY EXPLORATORY LAPAROTOMY     • ELBOW ARTHROPLASTY Right     Elbow surgery   • FRACTURE SURGERY     • HYSTERECTOMY  1999    Including both ovaries   • TONSILLECTOMY     • WRIST SURGERY Left        HEMATOLOGIC/ONCOLOGIC HISTORY:  (History from previous dates can be found in the separate document.)    MEDICATIONS    Current Outpatient Prescriptions:   •  amLODIPine (NORVASC) 5 MG tablet, Take 5 mg by mouth Daily., Disp: , Rfl:   •  atorvastatin (LIPITOR) 80 MG tablet, Take 1 tablet by mouth Every Night., Disp: 30 tablet, Rfl: 2  •  clopidogrel (PLAVIX) 75 MG tablet, Take 1 tablet by mouth Daily., Disp: 30 tablet, Rfl: 11  •  cyclobenzaprine (FLEXERIL) 10 MG tablet, , Disp: , Rfl:   •  fluticasone (FLONASE) 50 MCG/ACT nasal spray, 2 sprays into each nostril once  daily., Disp: , Rfl:   •  folic acid (FOLVITE) 1 MG tablet, Take 1 tablet by mouth Daily., Disp: 30 tablet, Rfl: 1  •  nicotine (NICODERM CQ) 21 MG/24HR patch, Place 1 patch on the skin Daily., Disp: 21 patch, Rfl: 0  •  NICOTINE STEP 2 14 MG/24HR patch, , Disp: , Rfl:   •  pantoprazole (PROTONIX) 40 MG EC tablet, Take 1 tablet by mouth daily., Disp: , Rfl:   •  QUEtiapine (SEROquel) 25 MG tablet, Take 1 tablet by mouth Every Night., Disp: 30 tablet, Rfl: 1  •  rOPINIRole (REQUIP) 1 MG tablet, Take 1 mg by mouth 2 (Two) Times a Day As Needed. Take 1 hour before bedtime., Disp: , Rfl:   •  thiamine (VITAMIN B1) 100 MG tablet, Take 1 tablet by mouth Daily., Disp: 30 tablet, Rfl: 1  •  traZODone (DESYREL) 50 MG tablet, Take 1 tablet by mouth every night at bedtime., Disp: , Rfl:   •  venlafaxine XR (EFFEXOR-XR) 75 MG 24 hr capsule, Take 225 mg by mouth Daily., Disp: , Rfl:     Current Facility-Administered Medications:   •  aspirin chewable tablet 81 mg, 81 mg, Oral, Daily, RUMA Cabrera    ALLERGIES:     Allergies   Allergen Reactions   • Sulfa Antibiotics Shortness Of Breath   • Beta Adrenergic Blockers      Other reaction(s): Syncope   • Codeine    • Erythromycin      Other reaction(s): Abdominal pain   • Penicillins Hives   • Tetracyclines & Related      Other reaction(s): Abdominal pain   • Varenicline      Other reaction(s): Angina       SOCIAL HISTORY:       Social History     Social History   • Marital status:      Spouse name: N/A   • Number of children: N/A   • Years of education: High school     Occupational History   • Property management Retired     Social History Main Topics   • Smoking status: Former Smoker     Packs/day: 1.50     Years: 25.00     Types: Cigarettes     Quit date: 12/11/2017   • Smokeless tobacco: Never Used      Comment: caffeine use   • Alcohol use 8.4 oz/week     14 Shots of liquor per week      Comment: 2 drinks gin and tonic every night according to daughter   •  "Drug use: No   • Sexual activity: Not Currently     Other Topics Concern   • Not on file     Social History Narrative         FAMILY HISTORY:  Family History   Problem Relation Age of Onset   • Heart attack Mother    • Heart disease Mother    • Skin cancer Mother    • COPD Mother    • Hypertension Mother    • Lung cancer Father 51   • Heart attack Brother    • Heart disease Brother      CABG; Pacemaker Placement   • Colon cancer Brother 61   • Heart disease Maternal Grandmother    • Anxiety disorder Other      (Symptom)   • Colon cancer Other    • Depression Other        REVIEW OF SYSTEMS:  Review of Systems   Constitutional: Negative for activity change.   HENT: Negative for nosebleeds and trouble swallowing.    Respiratory: Negative for shortness of breath and wheezing.    Cardiovascular: Negative for chest pain and palpitations.   Gastrointestinal: Negative for constipation, diarrhea and nausea.   Genitourinary: Negative for dysuria and hematuria.   Musculoskeletal: Negative for arthralgias and myalgias.   Skin: Negative for rash and wound.   Neurological: Negative for seizures and syncope.   Hematological: Negative for adenopathy. Does not bruise/bleed easily.   Psychiatric/Behavioral: Negative for confusion.      Objective    Vitals:    02/20/18 1504   BP: 112/70   Pulse: 92   Resp: 16   Temp: 98.7 °F (37.1 °C)   Weight: 66.4 kg (146 lb 6.4 oz)   Height: 162 cm (63.78\")   PainSc: 0-No pain     Current Status 2/20/2018   ECOG score 0      PHYSICAL EXAM:    CONSTITUTIONAL:  Vital signs reviewed.  No distress, looks comfortable.  EYES:  Conjunctiva and lids unremarkable.  PERRLA  EARS,NOSE,MOUTH,THROAT:  Ears and nose appear unremarkable.  Lips, teeth, gums appear unremarkable.  RESPIRATORY:  Normal respiratory effort.  Lungs clear to auscultation bilaterally.  CARDIOVASCULAR:  Normal S1, S2.  No murmurs rubs or gallops.  No significant lower extremity edema.  GASTROINTESTINAL: Abdomen appears unremarkable.  " Nontender.  No hepatomegaly.  No splenomegaly.  LYMPHATIC:  No cervical, supraclavicular, axillary lymphadenopathy.  MUSCULOSKELETAL:  Unremarkable gait and station.  Unremarkable digits/nails.  No cyanosis or clubbing.  SKIN:  Warm.  No rashes.  PSYCHIATRIC:  Normal judgment and insight.  Normal mood and affect.   RECENT LABS:        WBC   Date Value Ref Range Status   01/30/2018 9.66 4.00 - 10.00 10*3/mm3 Final   12/13/2017 7.43 4.50 - 10.70 10*3/mm3 Final   12/12/2017 7.17 4.50 - 10.70 10*3/mm3 Final   12/11/2017 8.10 4.50 - 10.70 10*3/mm3 Final   12/10/2017 7.26 4.50 - 10.70 10*3/mm3 Final   12/09/2017 7.03 4.50 - 10.70 10*3/mm3 Final   12/08/2017 5.84 4.50 - 10.70 10*3/mm3 Final   12/06/2017 5.94 4.50 - 10.70 10*3/mm3 Final   12/04/2017 6.91 4.50 - 10.70 10*3/mm3 Final   12/01/2017 9.02 4.50 - 10.70 10*3/mm3 Final     Hemoglobin   Date Value Ref Range Status   01/30/2018 12.8 11.5 - 14.9 g/dL Final   12/13/2017 12.9 11.9 - 15.5 g/dL Final   12/12/2017 12.0 11.9 - 15.5 g/dL Final   12/11/2017 12.0 11.9 - 15.5 g/dL Final   12/10/2017 12.3 11.9 - 15.5 g/dL Final   12/09/2017 13.0 11.9 - 15.5 g/dL Final   12/08/2017 12.5 11.9 - 15.5 g/dL Final   12/06/2017 13.6 11.9 - 15.5 g/dL Final   12/04/2017 12.3 11.9 - 15.5 g/dL Final   12/01/2017 16.4 (H) 11.9 - 15.5 g/dL Final     Platelets   Date Value Ref Range Status   01/30/2018 326 150 - 375 10*3/mm3 Final   12/13/2017 492 140 - 500 10*3/mm3 Final   12/12/2017 406 140 - 500 10*3/mm3 Final   12/11/2017 416 140 - 500 10*3/mm3 Final   12/10/2017 411 140 - 500 10*3/mm3 Final   12/09/2017 407 140 - 500 10*3/mm3 Final   12/08/2017 365 140 - 500 10*3/mm3 Final   12/06/2017 340 140 - 500 10*3/mm3 Final   12/04/2017 259 140 - 500 10*3/mm3 Final   12/01/2017 300 140 - 500 10*3/mm3 Final       Assessment/Plan   Cerebrovascular accident (CVA), unspecified mechanism      *Right MCA stroke.  Significant smoking history.  Patient states neurology (Jennifer Serrato, APRN) changed  Coumadin to aspirin and Plavix early January 2017.  Hypercoagulable labs 1/30/18 all unremarkable (antithrombin, lupus anticoagulant, beta-2 glycoprotein antibodies, anti-cardiolipan antibodies, protein S free, protein S activity, protein C activity, prothrombin 16403 gene mutation, factor 5 Leiden gene mutation)      Plan  No scheduled followup

## 2018-03-12 ENCOUNTER — TELEPHONE (OUTPATIENT)
Dept: NEUROLOGY | Facility: CLINIC | Age: 53
End: 2018-03-12

## 2018-03-12 NOTE — TELEPHONE ENCOUNTER
Letty Martin, daughter of Ms. Brambila called me.  She said her mother took off for [place] [employer]. She is not to be driving.  She also called and cancelled her follow up appointment in the neuro office and the oncology office.  Ms. Martin is very concerned, she said she and her mother have has words over the event.  I told her if her mother would like a follow up appointment please give me a call and we can schedule one.  PABLO Edmonds RN

## 2018-03-23 ENCOUNTER — OFFICE VISIT (OUTPATIENT)
Dept: NEUROLOGY | Facility: CLINIC | Age: 53
End: 2018-03-23

## 2018-03-23 VITALS
HEART RATE: 90 BPM | BODY MASS INDEX: 24.92 KG/M2 | OXYGEN SATURATION: 97 % | DIASTOLIC BLOOD PRESSURE: 64 MMHG | SYSTOLIC BLOOD PRESSURE: 106 MMHG | HEIGHT: 64 IN | WEIGHT: 146 LBS

## 2018-03-23 DIAGNOSIS — R53.83 OTHER FATIGUE: ICD-10-CM

## 2018-03-23 DIAGNOSIS — E78.5 HYPERLIPIDEMIA, UNSPECIFIED HYPERLIPIDEMIA TYPE: ICD-10-CM

## 2018-03-23 DIAGNOSIS — I10 ESSENTIAL HYPERTENSION: ICD-10-CM

## 2018-03-23 DIAGNOSIS — Z72.0 TOBACCO ABUSE: ICD-10-CM

## 2018-03-23 DIAGNOSIS — I63.9 CEREBROVASCULAR ACCIDENT (CVA), UNSPECIFIED MECHANISM (HCC): Primary | ICD-10-CM

## 2018-03-23 PROCEDURE — 99213 OFFICE O/P EST LOW 20 MIN: CPT | Performed by: NURSE PRACTITIONER
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
## 2018-05-21 ENCOUNTER — TELEPHONE (OUTPATIENT)
Dept: CARDIOLOGY | Facility: CLINIC | Age: 53
End: 2018-05-21

## 2018-05-21 DIAGNOSIS — I47.1 PAROXYSMAL SVT (SUPRAVENTRICULAR TACHYCARDIA) (HCC): Primary | ICD-10-CM

## 2018-05-21 NOTE — TELEPHONE ENCOUNTER
Pt called and states that pt PCP wants her to be seen sooner due to SVT and if you can order Zio patch.. Pt wore a 24 hrs Holter monitor last week and they will faxing the result to our office.....please advise      Thanks  Rina KELLER

## 2018-05-29 NOTE — TELEPHONE ENCOUNTER
Pt is having surgery on June 1, 2018 and wants to know if pt can wait after surgery for her to get the Zio.....MT

## 2018-08-14 ENCOUNTER — TELEPHONE (OUTPATIENT)
Dept: NEUROLOGY | Facility: CLINIC | Age: 53
End: 2018-08-14

## 2018-08-14 NOTE — TELEPHONE ENCOUNTER
----- Message from RUMA Pizarro sent at 8/13/2018  8:56 AM EDT -----  Regarding: RE: RX QUESTION  Contact: [phone]  I would not change anything, continue Plavix and ASA and yes she will bruise more easily. Only if she has bloody stool would I be concerned   ----- Message -----  From: Vanessa Cagle MA  Sent: 8/10/2018   3:26 PM  To: RUMA Pizarro, RUMA Cabrera  Subject: RE: RX QUESTION                                  LQ Patient but sending to [facility] while LQ off. Pt is aware about the Celebrex per Jennifer recommendation. She stated yesterday the top of her right foot had very abnormal bruising on it, was not painful. She called her PCP and they told her her blood may be to thin. She did not drop anything on it or hit it on anything. She stated today it looks better, but some brusing is still there.    ----- Message -----  From: Jennifer Serrato APRN  Sent: 8/6/2018  10:23 PM  To: Vanessa Cagle MA  Subject: RE: RX QUESTION                                  We don't generally recommend using celebrex or NSAIDs in general due to their association of increased risk of stroke.   ----- Message -----  From: Vanessa Cagle MA  Sent: 8/6/2018   2:59 PM  To: RUMA Cabrera  Subject: FW: RX QUESTION                                      ----- Message -----  From: Selin Short  Sent: 8/6/2018   1:12 PM  To: Vanessa Cagle MA  Subject: RX QUESTION                                      PTS PCP WANTS PT TO BEGIN TAKING CELEBREX, BUT SHE WANTS TO CHECK WITH LQ FIRST TO MAKE SURE IT IS SAFE.      THANK YOU

## 2018-09-12 ENCOUNTER — TELEPHONE (OUTPATIENT)
Dept: CARDIOLOGY | Facility: CLINIC | Age: 53
End: 2018-09-12

## 2019-01-17 ENCOUNTER — OFFICE VISIT (OUTPATIENT)
Dept: NEUROLOGY | Facility: CLINIC | Age: 54
End: 2019-01-17

## 2019-01-17 VITALS
HEIGHT: 64 IN | WEIGHT: 129.8 LBS | HEART RATE: 90 BPM | DIASTOLIC BLOOD PRESSURE: 66 MMHG | OXYGEN SATURATION: 98 % | SYSTOLIC BLOOD PRESSURE: 102 MMHG | BODY MASS INDEX: 22.16 KG/M2

## 2019-01-17 DIAGNOSIS — I63.89 CEREBROVASCULAR ACCIDENT (CVA) DUE TO OTHER MECHANISM (HCC): Primary | ICD-10-CM

## 2019-01-17 DIAGNOSIS — I69.354 HEMIPARESIS OF LEFT NONDOMINANT SIDE AS LATE EFFECT OF CEREBRAL INFARCTION (HCC): ICD-10-CM

## 2019-01-17 DIAGNOSIS — E78.1 HYPERTRIGLYCERIDEMIA: ICD-10-CM

## 2019-01-17 DIAGNOSIS — I10 ESSENTIAL HYPERTENSION: ICD-10-CM

## 2019-01-17 PROCEDURE — 99214 OFFICE O/P EST MOD 30 MIN: CPT | Performed by: NURSE PRACTITIONER

## 2019-01-17 RX ORDER — EZETIMIBE 10 MG/1
10 TABLET ORAL DAILY
COMMUNITY

## 2019-01-17 RX ORDER — CLOPIDOGREL BISULFATE 75 MG/1
75 TABLET ORAL DAILY
COMMUNITY
Start: 2019-01-14

## 2019-01-17 RX ORDER — CETIRIZINE HYDROCHLORIDE 10 MG/1
10 TABLET ORAL DAILY
COMMUNITY

## 2019-01-17 RX ORDER — CLONAZEPAM 0.5 MG/1
0.5 TABLET ORAL 2 TIMES DAILY PRN
COMMUNITY
End: 2020-02-20 | Stop reason: HOSPADM

## 2019-01-17 RX ORDER — ERGOCALCIFEROL 1.25 MG/1
CAPSULE ORAL
Refills: 6 | COMMUNITY
Start: 2018-11-17

## 2019-01-17 RX ORDER — HYDROCODONE BITARTRATE AND ACETAMINOPHEN 7.5; 325 MG/1; MG/1
1 TABLET ORAL EVERY 6 HOURS PRN
COMMUNITY

## 2019-01-17 RX ORDER — NICOTINE POLACRILEX 2 MG
LOZENGE BUCCAL
COMMUNITY
End: 2020-02-20

## 2019-01-17 NOTE — PROGRESS NOTES
"DOS: 2019  NAME: Angelita Brambila   : 1965  PCP: Ivonne Bradley MD    Chief Complaint   Patient presents with   • Cerebrovascular Accident      SUBJECTIVE  Neurological Problem:  53 y.o. RHW female with HLD, CAD (MI in 2004) and long h/o smoking and daily ETOH use who presents today to follow-up for stroke.     Interval History:   Ms. Austin was initially transferred from OSH on 17 with left sided weakness and NIH of 10. She received IV TPA and was found to have a RMCA stroke with hemorrhagic conversion and her vessel imaging revealed a right ICA thrombus. During her hospitalization she had multiple episodes of agitation requiring restraints. She underwent TCD with emboli monitoring that was negative for any HITS. She also had an EEG that was negative for seizures. Her agitation likely secondary to alcohol w/d. Her mental status improved and she was ultimately discharged on warfarin and lipitor 80 mg with plans for repeat CTA h/n to evaluate continued need for anticoagulation.  She had a repeat CTA done in 17 which showed near resolution of the right ICA clot she continues on warfarin for total of 30 days and then was transitioned to aspirin and Plavix.     She presents today and continues on the same medication regimen and is tolerating well.  She denies any new weakness, vision changes, speech difficulties or any new stroke/TIA symptoms.  She continues with tinnitus that is not new, something she has suffered with for \"years \".  She is back to all of her regular activities, has bought a camper and is traveling and hiking.  She wears a GPS sensitive alert necklace, as she lives alone.  She is followed by cardiology and has had a repeat prolonged cardiac monitoring that per patient was \"normal\".  She has made major lifestyle modifications since her event.  She takes her blood pressure regularly and states it is well-controlled.  She presents with her most recent PCP labs.  She is no longer " smoking or drinking.     Review of Systems:Review of Systems   Constitutional: Negative for activity change, appetite change and fatigue.   HENT: Negative for ear pain, facial swelling and trouble swallowing.    Eyes: Negative for photophobia, pain and visual disturbance.   Respiratory: Negative for choking, chest tightness and shortness of breath.    Cardiovascular: Negative for chest pain, palpitations and leg swelling.   Gastrointestinal: Negative for abdominal pain, constipation and nausea.   Endocrine: Negative for polydipsia, polyphagia and polyuria.   Genitourinary: Negative for difficulty urinating, frequency and urgency.   Musculoskeletal: Negative for back pain, gait problem and neck pain.   Skin: Negative for color change, rash and wound.   Allergic/Immunologic: Negative for environmental allergies, food allergies and immunocompromised state.   Neurological: Negative for dizziness, tremors, seizures, syncope, facial asymmetry, speech difficulty, weakness, light-headedness, numbness and headaches.   Hematological: Negative for adenopathy. Does not bruise/bleed easily.   Psychiatric/Behavioral: Negative for agitation, behavioral problems, confusion, decreased concentration, dysphoric mood, hallucinations, self-injury, sleep disturbance and suicidal ideas. The patient is not nervous/anxious and is not hyperactive.        Current Medications:   Current Outpatient Medications:   •  amLODIPine (NORVASC) 5 MG tablet, Take 5 mg by mouth Daily., Disp: , Rfl:   •  Aspirin (ASPIR-81 PO), Take  by mouth., Disp: , Rfl:   •  atorvastatin (LIPITOR) 80 MG tablet, Take 1 tablet by mouth Every Night., Disp: 30 tablet, Rfl: 2  •  cetirizine (zyrTEC) 10 MG tablet, Take 10 mg by mouth Daily., Disp: , Rfl:   •  clonazePAM (KlonoPIN) 0.5 MG tablet, Take 0.5 mg by mouth 2 (Two) Times a Day As Needed for Seizures., Disp: , Rfl:   •  clopidogrel (PLAVIX) 75 MG tablet, , Disp: , Rfl:   •  Cyanocobalamin (B-12) 5000 MCG sublingual  tablet, Place  under the tongue., Disp: , Rfl:   •  Cyanocobalamin 1000 MCG/ML kit, Inject  as directed., Disp: , Rfl:   •  cyclobenzaprine (FLEXERIL) 10 MG tablet, , Disp: , Rfl:   •  ezetimibe (ZETIA) 10 MG tablet, Take 10 mg by mouth Daily., Disp: , Rfl:   •  fluticasone (FLONASE) 50 MCG/ACT nasal spray, 2 sprays into each nostril once daily., Disp: , Rfl:   •  folic acid (FOLVITE) 1 MG tablet, Take 1 tablet by mouth Daily., Disp: 30 tablet, Rfl: 1  •  HYDROcodone-acetaminophen (NORCO) 5-325 MG per tablet, Take 1 tablet by mouth Every 6 (Six) Hours As Needed., Disp: , Rfl:   •  nicotine (NICODERM CQ) 21 MG/24HR patch, Place 1 patch on the skin Daily., Disp: 21 patch, Rfl: 0  •  pantoprazole (PROTONIX) 40 MG EC tablet, Take 1 tablet by mouth daily., Disp: , Rfl:   •  rOPINIRole (REQUIP) 1 MG tablet, Take 1 mg by mouth 2 (Two) Times a Day As Needed. Take 1 hour before bedtime., Disp: , Rfl:   •  thiamine (VITAMIN B1) 100 MG tablet, Take 1 tablet by mouth Daily., Disp: 30 tablet, Rfl: 1  •  traZODone (DESYREL) 50 MG tablet, Take 1 tablet by mouth every night at bedtime., Disp: , Rfl:   •  venlafaxine XR (EFFEXOR-XR) 75 MG 24 hr capsule, Take 225 mg by mouth Daily., Disp: , Rfl:   •  vitamin D (ERGOCALCIFEROL) 90853 units capsule capsule, TK 1 C PO Q WEEK, Disp: , Rfl: 6  No current facility-administered medications for this visit.     The following portions of the patient's history were reviewed and updated as appropriate: allergies, current medications, past family history, past medical history, past social history, past surgical history and problem list.    OBJECTIVE  Vitals:    01/17/19 1419   BP: 102/66   Pulse: 90   SpO2: 98%       Diagnostics:  Holter Monitor 8/15/18: Interpretation Summary   · An abnormal monitor study.  · There was one episodes of supraventricular tachycardia. The peak heart rate was 145 beats per minute. 9 beats on 8/22/18 at 8:57pm, may be atrial fibrillation.       Laboratory Results:          Lab Results   Component Value Date    WBC 9.66 01/30/2018    HGB 12.8 01/30/2018    HCT 37.0 01/30/2018    MCV 98.9 (H) 01/30/2018     01/30/2018     Lab Results   Component Value Date    GLUCOSE 90 12/06/2017    BUN 5 (L) 12/06/2017    CREATININE 0.80 12/20/2017    EGFRIFNONA 111 12/06/2017    BCR 8.8 12/06/2017    K 3.7 12/06/2017    CO2 22.7 12/06/2017    CALCIUM 9.2 12/06/2017    ALBUMIN 3.40 (L) 12/06/2017    AST 61 (H) 12/06/2017    ALT 31 12/06/2017     Lab Results   Component Value Date    HGBA1C 4.81 12/02/2017     Lab Results   Component Value Date    CHOL 184 12/02/2017     Lab Results   Component Value Date    HDL 40 12/02/2017     Lab Results   Component Value Date     (H) 12/02/2017     (H) 09/13/2017     Lab Results   Component Value Date    TRIG 140 12/02/2017     Outside Labs (source: [facility], 12/20/18)  Lipid Panel: Total 210, , HDL 54, LDL**Uncalcuable  TSH 0.841  B12 451  Vit D 33.5    Physical Examination:   General Appearance:   Well developed, thin, well groomed, alert, and cooperative.  HEENT: Normocephalic.    Neck and Spine: Normal range of motion.  Normal alignment. No mass or tenderness. No bruits.  Cardiac: Regular rate and rhythm. No murmurs.  Peripheral Vasculature: Radial pulses are equal and symmetric. No signs of distal  embolization.  Extremities:    No edema or deformities. Normal joint ROM.  Skin:    No rashes or birth marks.    Neurological examination:  Higher Integrative  Function: Oriented to time, place and person. Normal registration, recall (3/3), attention span and concentration. Normal language including comprehension, spontaneous speech, repetition, reading, writing, naming and vocabulary. No neglect with normal visual-spatial function and construction. Normal fund of knowledge and higher integrative function.  CN II: Pupils are equal, round, and reactive to light. Normal visual acuity and visual fields.    CN III IV  VI: Extraocular movements are full without nystagmus.   CN V: Normal facial sensation and strength of muscles of mastication.  CN VII: Facial movements are symmetric. No weakness.  CN VIII:   Auditory acuity is normal.  CN IX & X:   Symmetric palatal movement.  CN XI: Sternocleidomastoid and trapezius are normal.  No weakness.  CN XII:   The tongue is midline.  No atrophy or fasciculations.  Motor: Normal muscle strength, bulk and tone in upper and lower extremities except for mild orbiting about the .  No fasciculations, rigidity, spasticity, or abnormal movements.  Reflexes: 2+ in the upper and lower extremities.   Sensation: Normal to light touch and proprioception in arms and legs. Normal graphesthesia and no extinction on DSS.  Station and Gait: Normal gait and station.    Coordination: Finger to nose test shows no dysmetria.  Heel to shin normal.    Impression:  Ms. Brambila to use to do well following her right MCA stroke she suffered in December 2017.  He was treated with 30 days of anticoagulation but has since been maintained on aspirin and Plavix with no recurrent or new stroke/TIA symptoms.  The etiology of her event was likely artery to artery embolism from right ICA, although a cardiac source cannot totally be excluded. Her TTE and initial ZIO patch were unremarkable. She did have F/U with cardiology and had a repeat Holter that showed short run of ?afib. If patient were to have underlying PAF, would recommend anticoagulation. She may be candidate for ILR.     She has made significant lifestyle changes, however her most recent labs from her PCP show extremely high triglycerides.  She has recently started on a fibrate  and recommend continuing high-dose statin.  She will need close follow-up and management.  She plans on following up with her PCP.  We discussed the signs and symptoms of stroke and the importance of calling 911 if she were to develop any of these.  She will follow-up in one year, sooner  symptoms warrant.    Plan:     Continue ASA, Plavix and high-dose statin  F/U with PCP for continued cholesterol surveillance  If concern for PAF, recommend anticoagulation, ?ILR  Continue to monitor blood pressure regularly.  Encouraged continue smoking and alcohol cessation.  Secondary stroke prevention: Ideal targets for stroke prevention would be Blood pressure < 130/80; B12 > 500 TSH in normal range and LDL < 70; HbA1c < 6.5 and smoking cessation if applicable.    Call 911 for stroke symptoms  Follow-up in one year    I spent 30 minutes face to face with patient, with  > 50% spent counseling patient regarding diagnosis, review of diagnostics, personal risk factors for stroke and importance of risk factor control for stroke prevention.     Angelita was seen today for cerebrovascular accident.    Diagnoses and all orders for this visit:    Cerebrovascular accident (CVA) due to other mechanism    Hypertriglyceridemia    Essential hypertension    Hemiparesis of left nondominant side as late effect of cerebral infarction (CMS/HCC)        Coding

## 2020-02-20 ENCOUNTER — OFFICE VISIT (OUTPATIENT)
Dept: NEUROLOGY | Facility: CLINIC | Age: 55
End: 2020-02-20

## 2020-02-20 VITALS
DIASTOLIC BLOOD PRESSURE: 62 MMHG | HEIGHT: 64 IN | WEIGHT: 136 LBS | HEART RATE: 88 BPM | OXYGEN SATURATION: 97 % | BODY MASS INDEX: 23.22 KG/M2 | SYSTOLIC BLOOD PRESSURE: 108 MMHG

## 2020-02-20 DIAGNOSIS — M62.830 MUSCLE SPASM OF BACK: ICD-10-CM

## 2020-02-20 DIAGNOSIS — I10 ESSENTIAL HYPERTENSION: ICD-10-CM

## 2020-02-20 DIAGNOSIS — I63.89 CEREBROVASCULAR ACCIDENT (CVA) DUE TO OTHER MECHANISM (HCC): Primary | ICD-10-CM

## 2020-02-20 DIAGNOSIS — E78.1 HYPERTRIGLYCERIDEMIA: ICD-10-CM

## 2020-02-20 PROCEDURE — 99214 OFFICE O/P EST MOD 30 MIN: CPT | Performed by: NURSE PRACTITIONER

## 2020-02-20 RX ORDER — UBIDECARENONE 100 MG
100 CAPSULE ORAL DAILY
COMMUNITY

## 2020-02-20 RX ORDER — CHLORAL HYDRATE 500 MG
CAPSULE ORAL
COMMUNITY

## 2020-02-20 RX ORDER — ALBUTEROL SULFATE 90 UG/1
AEROSOL, METERED RESPIRATORY (INHALATION)
COMMUNITY
Start: 2020-01-14
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
## 2020-02-20 NOTE — PATIENT INSTRUCTIONS
Stroke Prevention  Some medical conditions and behaviors are associated with a higher chance of having a stroke. You can help prevent a stroke by making nutrition, lifestyle, and other changes, including managing any medical conditions you may have.  What nutrition changes can be made?    · Eat healthy foods. You can do this by:  ? Choosing foods high in fiber, such as fresh fruits and vegetables and whole grains.  ? Eating at least 5 or more servings of fruits and vegetables a day. Try to fill half of your plate at each meal with fruits and vegetables.  ? Choosing lean protein foods, such as lean cuts of meat, poultry without skin, fish, tofu, beans, and nuts.  ? Eating low-fat dairy products.  ? Avoiding foods that are high in salt (sodium). This can help lower blood pressure.  ? Avoiding foods that have saturated fat, trans fat, and cholesterol. This can help prevent high cholesterol.  ? Avoiding processed and premade foods.  · Follow your health care provider's specific guidelines for losing weight, controlling high blood pressure (hypertension), lowering high cholesterol, and managing diabetes. These may include:  ? Reducing your daily calorie intake.  ? Limiting your daily sodium intake to 1,500 milligrams (mg).  ? Using only healthy fats for cooking, such as olive oil, canola oil, or sunflower oil.  ? Counting your daily carbohydrate intake.  What lifestyle changes can be made?  · Maintain a healthy weight. Talk to your health care provider about your ideal weight.  · Get at least 30 minutes of moderate physical activity at least 5 days a week. Moderate activity includes brisk walking, biking, and swimming.  · Do not use any products that contain nicotine or tobacco, such as cigarettes and e-cigarettes. If you need help quitting, ask your health care provider. It may also be helpful to avoid exposure to secondhand smoke.  · Limit alcohol intake to no more than 1 drink a day for nonpregnant women and 2 drinks  a day for men. One drink equals 12 oz of beer, 5 oz of wine, or 1½ oz of hard liquor.  · Stop any illegal drug use.  · Avoid taking birth control pills. Talk to your health care provider about the risks of taking birth control pills if:  ? You are over 35 years old.  ? You smoke.  ? You get migraines.  ? You have ever had a blood clot.  What other changes can be made?  · Manage your cholesterol levels.  ? Eating a healthy diet is important for preventing high cholesterol. If cholesterol cannot be managed through diet alone, you may also need to take medicines.  ? Take any prescribed medicines to control your cholesterol as told by your health care provider.  · Manage your diabetes.  ? Eating a healthy diet and exercising regularly are important parts of managing your blood sugar. If your blood sugar cannot be managed through diet and exercise, you may need to take medicines.  ? Take any prescribed medicines to control your diabetes as told by your health care provider.  · Control your hypertension.  ? To reduce your risk of stroke, try to keep your blood pressure below 130/80.  ? Eating a healthy diet and exercising regularly are an important part of controlling your blood pressure. If your blood pressure cannot be managed through diet and exercise, you may need to take medicines.  ? Take any prescribed medicines to control hypertension as told by your health care provider.  ? Ask your health care provider if you should monitor your blood pressure at home.  ? Have your blood pressure checked every year, even if your blood pressure is normal. Blood pressure increases with age and some medical conditions.  · Get evaluated for sleep disorders (sleep apnea). Talk to your health care provider about getting a sleep evaluation if you snore a lot or have excessive sleepiness.  · Take over-the-counter and prescription medicines only as told by your health care provider. Aspirin or blood thinners (antiplatelets or  anticoagulants) may be recommended to reduce your risk of forming blood clots that can lead to stroke.  · Make sure that any other medical conditions you have, such as atrial fibrillation or atherosclerosis, are managed.  What are the warning signs of a stroke?  The warning signs of a stroke can be easily remembered as BEFAST.  · B is for balance. Signs include:  ? Dizziness.  ? Loss of balance or coordination.  ? Sudden trouble walking.  · E is for eyes. Signs include:  ? A sudden change in vision.  ? Trouble seeing.  · F is for face. Signs include:  ? Sudden weakness or numbness of the face.  ? The face or eyelid drooping to one side.  · A is for arms. Signs include:  ? Sudden weakness or numbness of the arm, usually on one side of the body.  · S is for speech. Signs include:  ? Trouble speaking (aphasia).  ? Trouble understanding.  · T is for time.  ? These symptoms may represent a serious problem that is an emergency. Do not wait to see if the symptoms will go away. Get medical help right away. Call your local emergency services (911 in the [place] Do not drive yourself to the hospital.  · Other signs of stroke may include:  ? A sudden, severe headache with no known cause.  ? Nausea or vomiting.  ? Seizure.  Where to find more information  For more information, visit:  · American Stroke Association: www.strokeassociation.org  · National Stroke Association: www.stroke.org  Summary  · You can prevent a stroke by eating healthy, exercising, not smoking, limiting alcohol intake, and managing any medical conditions you may have.  · Do not use any products that contain nicotine or tobacco, such as cigarettes and e-cigarettes. If you need help quitting, ask your health care provider. It may also be helpful to avoid exposure to secondhand smoke.  · Remember BEFAST for warning signs of stroke. Get help right away if you or a loved one has any of these signs.  This information is not intended to replace advice given to you  by your health care provider. Make sure you discuss any questions you have with your health care provider.  Document Released: 01/25/2006 Document Revised: 01/23/2018 Document Reviewed: 01/23/2018  Elsevier Interactive Patient Education © 2020 [employer]

## 2021-07-23 ENCOUNTER — TELEPHONE (OUTPATIENT)
Dept: NEUROLOGY | Facility: CLINIC | Age: 56
End: 2021-07-23

## 2021-07-23 NOTE — TELEPHONE ENCOUNTER
Pt will be returning my call in regards to the details for her MyChart video visit with Jennifer. Please transfer call to Sanaz.

## 2021-07-29 ENCOUNTER — TELEMEDICINE (OUTPATIENT)
Dept: NEUROLOGY | Facility: CLINIC | Age: 56
End: 2021-07-29

## 2021-07-29 DIAGNOSIS — I67.9 CEREBROVASCULAR DISEASE: Primary | ICD-10-CM

## 2021-07-29 DIAGNOSIS — G89.29 CHRONIC LOW BACK PAIN, UNSPECIFIED BACK PAIN LATERALITY, UNSPECIFIED WHETHER SCIATICA PRESENT: ICD-10-CM

## 2021-07-29 DIAGNOSIS — E78.1 HYPERTRIGLYCERIDEMIA: ICD-10-CM

## 2021-07-29 DIAGNOSIS — M54.50 CHRONIC LOW BACK PAIN, UNSPECIFIED BACK PAIN LATERALITY, UNSPECIFIED WHETHER SCIATICA PRESENT: ICD-10-CM

## 2021-07-29 DIAGNOSIS — I10 ESSENTIAL HYPERTENSION: ICD-10-CM

## 2021-07-29 PROCEDURE — 99213 OFFICE O/P EST LOW 20 MIN: CPT | Performed by: NURSE PRACTITIONER

## 2021-07-29 NOTE — TELEPHONE ENCOUNTER
MYRONM for pt to call office. We need to give her the login info for her MyChart. Please transfer call to Sanaz.

## 2021-07-29 NOTE — PROGRESS NOTES
DOS: 2021  NAME: Angelita Brambila   : 1965  PCP: Ivonne Bradley MD    No chief complaint on file.       I performed this clinical encounter via real-time telehealth video connection originating from the patient's location in [place] and my location at [facility]. Verbal consent to participate in this telehealth video clinical visit was obtained and any questions by the patient regarding the interaction were answered.  This visit occurred during the coronavirus (Covid-19) Public Health Emergency.     SUBJECTIVE  Neurological Problem:  56 y.o. RHW female with HLD, CAD (MI in ) and long h/o smoking, left wrist injury s/p surgery by [facility] (about 6 years prior) and daily ETOH use who presents today to follow-up for stroke via telehealth.   She is unaccompanied.     Interval History:   **For detailed history, please see progress note dated 2020.     Ms. Brambila has a h/o right MCA stroke she suffered in 2017, treated with 30 days of anticoagulation but has since been maintained on ASA, Plavix with no recurrent or new stroke/TIA symptoms.  The etiology of her event was considered to be an artery to artery embolism from right ICA, although a cardiac source could not totally be excluded.  Her cardiac work-up has been unrevealing. When last seen by me in 2020, it was noted that she had not had a follow-up with cardiology since  and recommendations were made for follow-up as her Holter monitor done in 2018 indicated possible afib.     She presents today, continues on DAPT + statin with no new stroke/TIA symptoms. She has been traveling around the country but does f/u regularly with her PCP and to have preventive appointments. She denies any significant changes in her health other than he worsening back pain, arthritis. She asks about alternatives to NSAIDs. States she will be seeing neurosurgery regarding her back while she is in [place]. She denies any other  changes in her health. She states her BP is well controlled.     No smoking. No falls.     Review of Systems:Review of Systems   Constitutional: Negative.    HENT: Negative.    Eyes: Negative.    Respiratory: Negative.    Musculoskeletal: Positive for back pain and myalgias.   Neurological: Negative.     Above ROS reviewed    The following portions of the patient's history were reviewed and updated as appropriate: allergies, current medications, past family history, past medical history, past social history, past surgical history and problem list.    Current Medications:   Current Outpatient Medications:   •  albuterol sulfate  (90 Base) MCG/ACT inhaler, , Disp: , Rfl:   •  amLODIPine (NORVASC) 5 MG tablet, Take 5 mg by mouth Daily., Disp: , Rfl:   •  Aspirin (ASPIR-81 PO), Take  by mouth., Disp: , Rfl:   •  atorvastatin (LIPITOR) 80 MG tablet, Take 1 tablet by mouth Every Night., Disp: 30 tablet, Rfl: 2  •  cetirizine (zyrTEC) 10 MG tablet, Take 10 mg by mouth Daily., Disp: , Rfl:   •  clopidogrel (PLAVIX) 75 MG tablet, Take 75 mg by mouth Daily., Disp: , Rfl:   •  coenzyme Q10 100 MG capsule, Take 100 mg by mouth Daily., Disp: , Rfl:   •  cyclobenzaprine (FLEXERIL) 10 MG tablet, Take 10 mg by mouth 2 (Two) Times a Day As Needed., Disp: , Rfl:   •  ezetimibe (ZETIA) 10 MG tablet, Take 10 mg by mouth Daily., Disp: , Rfl:   •  fluticasone (FLONASE) 50 MCG/ACT nasal spray, 2 sprays into each nostril once daily., Disp: , Rfl:   •  folic acid (FOLVITE) 1 MG tablet, Take 1 tablet by mouth Daily., Disp: 30 tablet, Rfl: 1  •  HYDROcodone-acetaminophen (NORCO) 7.5-325 MG per tablet, Take 1 tablet by mouth Every 6 (Six) Hours As Needed., Disp: , Rfl:   •  Omega-3 Fatty Acids (FISH OIL) 1000 MG capsule capsule, Take  by mouth Daily With Breakfast., Disp: , Rfl:   •  pantoprazole (PROTONIX) 40 MG EC tablet, Take 1 tablet by mouth daily., Disp: , Rfl:   •  rOPINIRole (REQUIP) 1 MG tablet, Take 1 mg by mouth Every Night.  Take 1 hour before bedtime. , Disp: , Rfl:   •  thiamine (VITAMIN B1) 100 MG tablet, Take 1 tablet by mouth Daily., Disp: 30 tablet, Rfl: 1  •  traZODone (DESYREL) 50 MG tablet, Take 1 tablet by mouth every night at bedtime., Disp: , Rfl:   •  venlafaxine XR (EFFEXOR-XR) 75 MG 24 hr capsule, Take 225 mg by mouth Daily., Disp: , Rfl:   •  vitamin D (ERGOCALCIFEROL) 64807 units capsule capsule, TK 1 C PO Q WEEK, Disp: , Rfl: 6      OBJECTIVE  There were no vitals filed for this visit.  There is no height or weight on file to calculate BMI.    Diagnostics:    Laboratory Results:         Lab Results   Component Value Date    WBC 9.66 01/30/2018    HGB 12.8 01/30/2018    HCT 37.0 01/30/2018    MCV 98.9 (H) 01/30/2018     01/30/2018     Lab Results   Component Value Date    GLUCOSE 90 12/06/2017    BUN 5 (L) 12/06/2017    CREATININE 0.80 12/20/2017    EGFRIFNONA 111 12/06/2017    BCR 8.8 12/06/2017    K 3.7 12/06/2017    CO2 22.7 12/06/2017    CALCIUM 9.2 12/06/2017    ALBUMIN 3.40 (L) 12/06/2017    AST 61 (H) 12/06/2017    ALT 31 12/06/2017     Lab Results   Component Value Date    HGBA1C 4.81 12/02/2017     Lab Results   Component Value Date    CHOL 184 12/02/2017     Lab Results   Component Value Date    HDL 40 12/02/2017     Lab Results   Component Value Date     (H) 12/02/2017     (H) 09/13/2017     Lab Results   Component Value Date    TRIG 140 12/02/2017     No results found for: RPR  No results found for: TSH  No results found for: [id number]    The following exam is limited due to electronic format of today's visit.    Exam:  GENERAL: Well developed, well nourished, well groomed, alert, and cooperative  HEENT: Normocephalic, atraumatic   Resp: Even and unlabored  Psychiatric: Normal mood and affect.    Neurological:   MS: AO. Language normal. No neglect. Follows all commands.  CN: No facial weakness.  No dysarthria.   Motor: DOE purposefully. Bilateral upper extremities without  drift.  Sensory:Patient reports intact.  Station and Gait: Ambulates about without difficulty    Impression/Plan:    1. Stroke  Obtain most recent labs from PCP  Continue ASA, Plavix, Lipitor  Recommend f/u with cardiology, ?need for ILR  Encouraged regular physical activity, health diet  Secondary stroke prevention: Ideal targets for stroke prevention would be Blood pressure < 130/80; B12 > 500 TSH in normal range and LDL < 70; HbA1c < 6.5 and smoking cessation if applicable.  Call 911 for stroke symptoms  Follow-up in one year    2. Chronic low back pain  Okay to use topicals and Tylenol ES, avoid chronic NSAID use  Patient to f/u with MISAEL in [place]    There are no diagnoses linked to this encounter.    Coding      Dictated using Dragon
